# Patient Record
Sex: FEMALE | Race: WHITE | NOT HISPANIC OR LATINO | URBAN - METROPOLITAN AREA
[De-identification: names, ages, dates, MRNs, and addresses within clinical notes are randomized per-mention and may not be internally consistent; named-entity substitution may affect disease eponyms.]

---

## 2017-06-12 ENCOUNTER — OUTPATIENT (OUTPATIENT)
Dept: OUTPATIENT SERVICES | Facility: HOSPITAL | Age: 57
LOS: 1 days | Discharge: HOME | End: 2017-06-12

## 2017-06-12 DIAGNOSIS — F11.10 OPIOID ABUSE, UNCOMPLICATED: ICD-10-CM

## 2017-06-12 DIAGNOSIS — J44.1 CHRONIC OBSTRUCTIVE PULMONARY DISEASE WITH (ACUTE) EXACERBATION: ICD-10-CM

## 2017-06-12 DIAGNOSIS — R07.9 CHEST PAIN, UNSPECIFIED: ICD-10-CM

## 2017-06-12 DIAGNOSIS — M47.812 SPONDYLOSIS WITHOUT MYELOPATHY OR RADICULOPATHY, CERVICAL REGION: ICD-10-CM

## 2017-06-12 DIAGNOSIS — R09.02 HYPOXEMIA: ICD-10-CM

## 2017-06-12 DIAGNOSIS — F10.10 ALCOHOL ABUSE, UNCOMPLICATED: ICD-10-CM

## 2017-06-12 DIAGNOSIS — F33.2 MAJOR DEPRESSIVE DISORDER, RECURRENT SEVERE WITHOUT PSYCHOTIC FEATURES: ICD-10-CM

## 2017-06-28 DIAGNOSIS — F33.1 MAJOR DEPRESSIVE DISORDER, RECURRENT, MODERATE: ICD-10-CM

## 2017-07-20 ENCOUNTER — OUTPATIENT (OUTPATIENT)
Dept: OUTPATIENT SERVICES | Facility: HOSPITAL | Age: 57
LOS: 1 days | Discharge: HOME | End: 2017-07-20

## 2017-07-20 DIAGNOSIS — R09.02 HYPOXEMIA: ICD-10-CM

## 2017-07-20 DIAGNOSIS — J44.1 CHRONIC OBSTRUCTIVE PULMONARY DISEASE WITH (ACUTE) EXACERBATION: ICD-10-CM

## 2017-07-20 DIAGNOSIS — F33.2 MAJOR DEPRESSIVE DISORDER, RECURRENT SEVERE WITHOUT PSYCHOTIC FEATURES: ICD-10-CM

## 2017-07-20 DIAGNOSIS — F11.10 OPIOID ABUSE, UNCOMPLICATED: ICD-10-CM

## 2017-07-20 DIAGNOSIS — F33.1 MAJOR DEPRESSIVE DISORDER, RECURRENT, MODERATE: ICD-10-CM

## 2017-07-20 DIAGNOSIS — R07.9 CHEST PAIN, UNSPECIFIED: ICD-10-CM

## 2017-07-20 DIAGNOSIS — M47.812 SPONDYLOSIS WITHOUT MYELOPATHY OR RADICULOPATHY, CERVICAL REGION: ICD-10-CM

## 2017-07-20 DIAGNOSIS — F10.10 ALCOHOL ABUSE, UNCOMPLICATED: ICD-10-CM

## 2017-08-28 ENCOUNTER — OUTPATIENT (OUTPATIENT)
Dept: OUTPATIENT SERVICES | Facility: HOSPITAL | Age: 57
LOS: 1 days | Discharge: HOME | End: 2017-08-28

## 2017-08-28 DIAGNOSIS — F33.1 MAJOR DEPRESSIVE DISORDER, RECURRENT, MODERATE: ICD-10-CM

## 2017-08-28 DIAGNOSIS — F11.10 OPIOID ABUSE, UNCOMPLICATED: ICD-10-CM

## 2017-08-28 DIAGNOSIS — F33.2 MAJOR DEPRESSIVE DISORDER, RECURRENT SEVERE WITHOUT PSYCHOTIC FEATURES: ICD-10-CM

## 2017-08-28 DIAGNOSIS — R07.9 CHEST PAIN, UNSPECIFIED: ICD-10-CM

## 2017-08-28 DIAGNOSIS — F10.10 ALCOHOL ABUSE, UNCOMPLICATED: ICD-10-CM

## 2017-08-28 DIAGNOSIS — M47.812 SPONDYLOSIS WITHOUT MYELOPATHY OR RADICULOPATHY, CERVICAL REGION: ICD-10-CM

## 2017-08-28 DIAGNOSIS — R09.02 HYPOXEMIA: ICD-10-CM

## 2017-08-28 DIAGNOSIS — J44.1 CHRONIC OBSTRUCTIVE PULMONARY DISEASE WITH (ACUTE) EXACERBATION: ICD-10-CM

## 2017-09-28 ENCOUNTER — OUTPATIENT (OUTPATIENT)
Dept: OUTPATIENT SERVICES | Facility: HOSPITAL | Age: 57
LOS: 1 days | Discharge: HOME | End: 2017-09-28

## 2017-09-28 DIAGNOSIS — M47.812 SPONDYLOSIS WITHOUT MYELOPATHY OR RADICULOPATHY, CERVICAL REGION: ICD-10-CM

## 2017-09-28 DIAGNOSIS — R09.02 HYPOXEMIA: ICD-10-CM

## 2017-09-28 DIAGNOSIS — F33.2 MAJOR DEPRESSIVE DISORDER, RECURRENT SEVERE WITHOUT PSYCHOTIC FEATURES: ICD-10-CM

## 2017-09-28 DIAGNOSIS — F10.10 ALCOHOL ABUSE, UNCOMPLICATED: ICD-10-CM

## 2017-09-28 DIAGNOSIS — J44.1 CHRONIC OBSTRUCTIVE PULMONARY DISEASE WITH (ACUTE) EXACERBATION: ICD-10-CM

## 2017-09-28 DIAGNOSIS — F33.1 MAJOR DEPRESSIVE DISORDER, RECURRENT, MODERATE: ICD-10-CM

## 2017-09-28 DIAGNOSIS — F11.10 OPIOID ABUSE, UNCOMPLICATED: ICD-10-CM

## 2017-09-28 DIAGNOSIS — R07.9 CHEST PAIN, UNSPECIFIED: ICD-10-CM

## 2017-10-26 ENCOUNTER — OUTPATIENT (OUTPATIENT)
Dept: OUTPATIENT SERVICES | Facility: HOSPITAL | Age: 57
LOS: 1 days | Discharge: HOME | End: 2017-10-26

## 2017-10-26 DIAGNOSIS — F33.1 MAJOR DEPRESSIVE DISORDER, RECURRENT, MODERATE: ICD-10-CM

## 2017-11-30 ENCOUNTER — OUTPATIENT (OUTPATIENT)
Dept: OUTPATIENT SERVICES | Facility: HOSPITAL | Age: 57
LOS: 1 days | Discharge: HOME | End: 2017-11-30

## 2017-11-30 DIAGNOSIS — F33.1 MAJOR DEPRESSIVE DISORDER, RECURRENT, MODERATE: ICD-10-CM

## 2017-12-19 ENCOUNTER — OUTPATIENT (OUTPATIENT)
Dept: OUTPATIENT SERVICES | Facility: HOSPITAL | Age: 57
LOS: 1 days | Discharge: HOME | End: 2017-12-19

## 2017-12-19 DIAGNOSIS — F33.1 MAJOR DEPRESSIVE DISORDER, RECURRENT, MODERATE: ICD-10-CM

## 2018-01-18 ENCOUNTER — EMERGENCY (EMERGENCY)
Facility: HOSPITAL | Age: 58
LOS: 0 days | Discharge: HOME | End: 2018-01-18

## 2018-01-18 DIAGNOSIS — J44.1 CHRONIC OBSTRUCTIVE PULMONARY DISEASE WITH (ACUTE) EXACERBATION: ICD-10-CM

## 2018-01-18 DIAGNOSIS — R10.32 LEFT LOWER QUADRANT PAIN: ICD-10-CM

## 2018-01-18 DIAGNOSIS — R07.9 CHEST PAIN, UNSPECIFIED: ICD-10-CM

## 2018-01-18 DIAGNOSIS — F11.10 OPIOID ABUSE, UNCOMPLICATED: ICD-10-CM

## 2018-01-18 DIAGNOSIS — F33.2 MAJOR DEPRESSIVE DISORDER, RECURRENT SEVERE WITHOUT PSYCHOTIC FEATURES: ICD-10-CM

## 2018-01-18 DIAGNOSIS — M47.812 SPONDYLOSIS WITHOUT MYELOPATHY OR RADICULOPATHY, CERVICAL REGION: ICD-10-CM

## 2018-01-18 DIAGNOSIS — F10.10 ALCOHOL ABUSE, UNCOMPLICATED: ICD-10-CM

## 2018-01-18 DIAGNOSIS — R19.7 DIARRHEA, UNSPECIFIED: ICD-10-CM

## 2018-01-18 DIAGNOSIS — R35.0 FREQUENCY OF MICTURITION: ICD-10-CM

## 2018-01-18 DIAGNOSIS — R11.0 NAUSEA: ICD-10-CM

## 2018-01-18 DIAGNOSIS — R09.02 HYPOXEMIA: ICD-10-CM

## 2018-01-18 DIAGNOSIS — Z79.899 OTHER LONG TERM (CURRENT) DRUG THERAPY: ICD-10-CM

## 2018-01-18 DIAGNOSIS — R30.0 DYSURIA: ICD-10-CM

## 2018-01-30 DIAGNOSIS — M47.812 SPONDYLOSIS WITHOUT MYELOPATHY OR RADICULOPATHY, CERVICAL REGION: ICD-10-CM

## 2018-01-30 DIAGNOSIS — J44.1 CHRONIC OBSTRUCTIVE PULMONARY DISEASE WITH (ACUTE) EXACERBATION: ICD-10-CM

## 2018-01-30 DIAGNOSIS — F11.10 OPIOID ABUSE, UNCOMPLICATED: ICD-10-CM

## 2018-01-30 DIAGNOSIS — F10.10 ALCOHOL ABUSE, UNCOMPLICATED: ICD-10-CM

## 2018-01-30 DIAGNOSIS — R09.02 HYPOXEMIA: ICD-10-CM

## 2018-01-30 DIAGNOSIS — F33.2 MAJOR DEPRESSIVE DISORDER, RECURRENT SEVERE WITHOUT PSYCHOTIC FEATURES: ICD-10-CM

## 2018-01-30 DIAGNOSIS — R07.9 CHEST PAIN, UNSPECIFIED: ICD-10-CM

## 2018-01-31 ENCOUNTER — OUTPATIENT (OUTPATIENT)
Dept: OUTPATIENT SERVICES | Facility: HOSPITAL | Age: 58
LOS: 1 days | Discharge: HOME | End: 2018-01-31

## 2018-01-31 DIAGNOSIS — F33.1 MAJOR DEPRESSIVE DISORDER, RECURRENT, MODERATE: ICD-10-CM

## 2018-02-12 ENCOUNTER — OUTPATIENT (OUTPATIENT)
Dept: OUTPATIENT SERVICES | Facility: HOSPITAL | Age: 58
LOS: 1 days | Discharge: HOME | End: 2018-02-12

## 2018-02-12 DIAGNOSIS — F33.1 MAJOR DEPRESSIVE DISORDER, RECURRENT, MODERATE: ICD-10-CM

## 2018-02-28 ENCOUNTER — OUTPATIENT (OUTPATIENT)
Dept: OUTPATIENT SERVICES | Facility: HOSPITAL | Age: 58
LOS: 1 days | Discharge: HOME | End: 2018-02-28

## 2018-02-28 DIAGNOSIS — F33.1 MAJOR DEPRESSIVE DISORDER, RECURRENT, MODERATE: ICD-10-CM

## 2018-03-09 ENCOUNTER — OUTPATIENT (OUTPATIENT)
Dept: OUTPATIENT SERVICES | Facility: HOSPITAL | Age: 58
LOS: 1 days | Discharge: HOME | End: 2018-03-09

## 2018-03-09 DIAGNOSIS — F33.1 MAJOR DEPRESSIVE DISORDER, RECURRENT, MODERATE: ICD-10-CM

## 2018-04-18 ENCOUNTER — EMERGENCY (EMERGENCY)
Facility: HOSPITAL | Age: 58
LOS: 0 days | Discharge: HOME | End: 2018-04-18
Admitting: PHYSICIAN ASSISTANT

## 2018-04-18 VITALS
DIASTOLIC BLOOD PRESSURE: 53 MMHG | OXYGEN SATURATION: 98 % | RESPIRATION RATE: 18 BRPM | TEMPERATURE: 97 F | SYSTOLIC BLOOD PRESSURE: 104 MMHG | HEART RATE: 70 BPM

## 2018-04-18 DIAGNOSIS — F17.200 NICOTINE DEPENDENCE, UNSPECIFIED, UNCOMPLICATED: ICD-10-CM

## 2018-04-18 DIAGNOSIS — M54.5 LOW BACK PAIN: ICD-10-CM

## 2018-04-18 DIAGNOSIS — Z79.899 OTHER LONG TERM (CURRENT) DRUG THERAPY: ICD-10-CM

## 2018-04-18 DIAGNOSIS — G89.29 OTHER CHRONIC PAIN: ICD-10-CM

## 2018-04-18 RX ORDER — KETOROLAC TROMETHAMINE 30 MG/ML
30 SYRINGE (ML) INJECTION ONCE
Qty: 0 | Refills: 0 | Status: DISCONTINUED | OUTPATIENT
Start: 2018-04-18 | End: 2018-04-18

## 2018-04-18 RX ORDER — METHOCARBAMOL 500 MG/1
1 TABLET, FILM COATED ORAL
Qty: 21 | Refills: 0
Start: 2018-04-18 | End: 2018-04-24

## 2018-04-18 RX ORDER — IBUPROFEN 200 MG
1 TABLET ORAL
Qty: 42 | Refills: 0
Start: 2018-04-18 | End: 2018-05-01

## 2018-04-18 RX ADMIN — Medication 30 MILLIGRAM(S): at 11:40

## 2018-04-18 NOTE — ED PROVIDER NOTE - MUSCULOSKELETAL, MLM
Spine appears normal, range of motion is not limited, (+) Left lower paraspinal Lumbar tenderness with spasm, no midline tenderness

## 2018-04-18 NOTE — ED PROVIDER NOTE - OBJECTIVE STATEMENT
58 y.o. female with a PMHx of degenerative disc disease presented to the ER c/o Left sided low back pain for the past month, worse over the past several days.  Pt denies fever, chills, hematuria, abdominal pain, saddle numbness, bladder/bowel incontinence, lower extremity weakness/paresthesias.  Pain worse with standing, movement, and prolonged sitting.

## 2018-04-18 NOTE — ED PROVIDER NOTE - MEDICAL DECISION MAKING DETAILS
NSAIDS; muscle relaxers; Rehab referral; any new or worsening symptoms, will return to ER NSAIDS; muscle relaxers; Rehab referral; any new or worsening symptoms, will return to ER  Note complete

## 2018-04-18 NOTE — ED PROVIDER NOTE - PMH
Bile duct stone with obstruct    Hiatal hernia Bile duct stone with obstruct    Degenerative disc disease, lumbar    Hiatal hernia

## 2018-04-24 ENCOUNTER — OUTPATIENT (OUTPATIENT)
Dept: OUTPATIENT SERVICES | Facility: HOSPITAL | Age: 58
LOS: 1 days | Discharge: HOME | End: 2018-04-24

## 2018-04-24 DIAGNOSIS — F33.1 MAJOR DEPRESSIVE DISORDER, RECURRENT, MODERATE: ICD-10-CM

## 2018-05-31 ENCOUNTER — OUTPATIENT (OUTPATIENT)
Dept: OUTPATIENT SERVICES | Facility: HOSPITAL | Age: 58
LOS: 1 days | Discharge: HOME | End: 2018-05-31

## 2018-05-31 DIAGNOSIS — F33.1 MAJOR DEPRESSIVE DISORDER, RECURRENT, MODERATE: ICD-10-CM

## 2018-06-29 ENCOUNTER — OUTPATIENT (OUTPATIENT)
Dept: OUTPATIENT SERVICES | Facility: HOSPITAL | Age: 58
LOS: 1 days | Discharge: HOME | End: 2018-06-29

## 2018-06-29 DIAGNOSIS — F33.1 MAJOR DEPRESSIVE DISORDER, RECURRENT, MODERATE: ICD-10-CM

## 2018-07-30 ENCOUNTER — OUTPATIENT (OUTPATIENT)
Dept: OUTPATIENT SERVICES | Facility: HOSPITAL | Age: 58
LOS: 1 days | Discharge: HOME | End: 2018-07-30

## 2018-07-30 DIAGNOSIS — F33.1 MAJOR DEPRESSIVE DISORDER, RECURRENT, MODERATE: ICD-10-CM

## 2018-08-06 PROBLEM — K44.9 DIAPHRAGMATIC HERNIA WITHOUT OBSTRUCTION OR GANGRENE: Chronic | Status: ACTIVE | Noted: 2018-04-18

## 2018-08-06 PROBLEM — M51.36 OTHER INTERVERTEBRAL DISC DEGENERATION, LUMBAR REGION: Chronic | Status: ACTIVE | Noted: 2018-04-18

## 2018-08-06 PROBLEM — K80.51 CALCULUS OF BILE DUCT WITHOUT CHOLANGITIS OR CHOLECYSTITIS WITH OBSTRUCTION: Chronic | Status: ACTIVE | Noted: 2018-04-18

## 2018-08-31 ENCOUNTER — OUTPATIENT (OUTPATIENT)
Dept: OUTPATIENT SERVICES | Facility: HOSPITAL | Age: 58
LOS: 1 days | Discharge: HOME | End: 2018-08-31

## 2018-08-31 DIAGNOSIS — F33.1 MAJOR DEPRESSIVE DISORDER, RECURRENT, MODERATE: ICD-10-CM

## 2018-09-28 ENCOUNTER — OUTPATIENT (OUTPATIENT)
Dept: OUTPATIENT SERVICES | Facility: HOSPITAL | Age: 58
LOS: 1 days | Discharge: HOME | End: 2018-09-28

## 2018-09-28 DIAGNOSIS — F33.1 MAJOR DEPRESSIVE DISORDER, RECURRENT, MODERATE: ICD-10-CM

## 2018-10-19 ENCOUNTER — OUTPATIENT (OUTPATIENT)
Dept: OUTPATIENT SERVICES | Facility: HOSPITAL | Age: 58
LOS: 1 days | Discharge: HOME | End: 2018-10-19

## 2018-10-19 DIAGNOSIS — F33.1 MAJOR DEPRESSIVE DISORDER, RECURRENT, MODERATE: ICD-10-CM

## 2018-11-19 ENCOUNTER — OUTPATIENT (OUTPATIENT)
Dept: OUTPATIENT SERVICES | Facility: HOSPITAL | Age: 58
LOS: 1 days | Discharge: HOME | End: 2018-11-19

## 2018-11-19 DIAGNOSIS — F33.1 MAJOR DEPRESSIVE DISORDER, RECURRENT, MODERATE: ICD-10-CM

## 2018-12-18 ENCOUNTER — OUTPATIENT (OUTPATIENT)
Dept: OUTPATIENT SERVICES | Facility: HOSPITAL | Age: 58
LOS: 1 days | Discharge: HOME | End: 2018-12-18

## 2018-12-18 DIAGNOSIS — F33.1 MAJOR DEPRESSIVE DISORDER, RECURRENT, MODERATE: ICD-10-CM

## 2019-01-03 ENCOUNTER — OUTPATIENT (OUTPATIENT)
Dept: OUTPATIENT SERVICES | Facility: HOSPITAL | Age: 59
LOS: 1 days | Discharge: HOME | End: 2019-01-03

## 2019-01-03 DIAGNOSIS — F33.1 MAJOR DEPRESSIVE DISORDER, RECURRENT, MODERATE: ICD-10-CM

## 2019-02-05 ENCOUNTER — OUTPATIENT (OUTPATIENT)
Dept: OUTPATIENT SERVICES | Facility: HOSPITAL | Age: 59
LOS: 1 days | Discharge: HOME | End: 2019-02-05

## 2019-02-05 DIAGNOSIS — F33.1 MAJOR DEPRESSIVE DISORDER, RECURRENT, MODERATE: ICD-10-CM

## 2019-02-15 ENCOUNTER — OUTPATIENT (OUTPATIENT)
Dept: OUTPATIENT SERVICES | Facility: HOSPITAL | Age: 59
LOS: 1 days | Discharge: HOME | End: 2019-02-15

## 2019-02-15 DIAGNOSIS — F33.1 MAJOR DEPRESSIVE DISORDER, RECURRENT, MODERATE: ICD-10-CM

## 2019-02-19 ENCOUNTER — OUTPATIENT (OUTPATIENT)
Dept: OUTPATIENT SERVICES | Facility: HOSPITAL | Age: 59
LOS: 1 days | Discharge: HOME | End: 2019-02-19

## 2019-02-19 DIAGNOSIS — F33.1 MAJOR DEPRESSIVE DISORDER, RECURRENT, MODERATE: ICD-10-CM

## 2019-03-08 ENCOUNTER — OUTPATIENT (OUTPATIENT)
Dept: OUTPATIENT SERVICES | Facility: HOSPITAL | Age: 59
LOS: 1 days | Discharge: HOME | End: 2019-03-08

## 2019-03-08 DIAGNOSIS — F33.1 MAJOR DEPRESSIVE DISORDER, RECURRENT, MODERATE: ICD-10-CM

## 2019-03-15 ENCOUNTER — OUTPATIENT (OUTPATIENT)
Dept: OUTPATIENT SERVICES | Facility: HOSPITAL | Age: 59
LOS: 1 days | Discharge: HOME | End: 2019-03-15

## 2019-03-15 DIAGNOSIS — F33.1 MAJOR DEPRESSIVE DISORDER, RECURRENT, MODERATE: ICD-10-CM

## 2019-03-22 ENCOUNTER — OUTPATIENT (OUTPATIENT)
Dept: OUTPATIENT SERVICES | Facility: HOSPITAL | Age: 59
LOS: 1 days | Discharge: HOME | End: 2019-03-22

## 2019-03-22 DIAGNOSIS — F33.1 MAJOR DEPRESSIVE DISORDER, RECURRENT, MODERATE: ICD-10-CM

## 2019-03-29 ENCOUNTER — OUTPATIENT (OUTPATIENT)
Dept: OUTPATIENT SERVICES | Facility: HOSPITAL | Age: 59
LOS: 1 days | Discharge: HOME | End: 2019-03-29

## 2019-03-29 DIAGNOSIS — F33.1 MAJOR DEPRESSIVE DISORDER, RECURRENT, MODERATE: ICD-10-CM

## 2019-04-10 ENCOUNTER — OUTPATIENT (OUTPATIENT)
Dept: OUTPATIENT SERVICES | Facility: HOSPITAL | Age: 59
LOS: 1 days | Discharge: HOME | End: 2019-04-10

## 2019-04-10 DIAGNOSIS — F33.1 MAJOR DEPRESSIVE DISORDER, RECURRENT, MODERATE: ICD-10-CM

## 2019-04-11 ENCOUNTER — OUTPATIENT (OUTPATIENT)
Dept: OUTPATIENT SERVICES | Facility: HOSPITAL | Age: 59
LOS: 1 days | Discharge: HOME | End: 2019-04-11

## 2019-04-11 DIAGNOSIS — F33.1 MAJOR DEPRESSIVE DISORDER, RECURRENT, MODERATE: ICD-10-CM

## 2019-04-26 ENCOUNTER — OUTPATIENT (OUTPATIENT)
Dept: OUTPATIENT SERVICES | Facility: HOSPITAL | Age: 59
LOS: 1 days | Discharge: HOME | End: 2019-04-26

## 2019-04-26 DIAGNOSIS — F33.1 MAJOR DEPRESSIVE DISORDER, RECURRENT, MODERATE: ICD-10-CM

## 2019-04-29 ENCOUNTER — OUTPATIENT (OUTPATIENT)
Dept: OUTPATIENT SERVICES | Facility: HOSPITAL | Age: 59
LOS: 1 days | Discharge: HOME | End: 2019-04-29

## 2019-04-29 DIAGNOSIS — F33.1 MAJOR DEPRESSIVE DISORDER, RECURRENT, MODERATE: ICD-10-CM

## 2019-05-09 ENCOUNTER — OUTPATIENT (OUTPATIENT)
Dept: OUTPATIENT SERVICES | Facility: HOSPITAL | Age: 59
LOS: 1 days | Discharge: HOME | End: 2019-05-09

## 2019-05-10 ENCOUNTER — OUTPATIENT (OUTPATIENT)
Dept: OUTPATIENT SERVICES | Facility: HOSPITAL | Age: 59
LOS: 1 days | Discharge: HOME | End: 2019-05-10
Payer: MEDICAID

## 2019-05-10 DIAGNOSIS — F33.1 MAJOR DEPRESSIVE DISORDER, RECURRENT, MODERATE: ICD-10-CM

## 2019-05-10 PROCEDURE — 99214 OFFICE O/P EST MOD 30 MIN: CPT

## 2019-05-30 ENCOUNTER — OUTPATIENT (OUTPATIENT)
Dept: OUTPATIENT SERVICES | Facility: HOSPITAL | Age: 59
LOS: 1 days | Discharge: HOME | End: 2019-05-30

## 2019-05-30 DIAGNOSIS — F33.1 MAJOR DEPRESSIVE DISORDER, RECURRENT, MODERATE: ICD-10-CM

## 2019-06-06 ENCOUNTER — OUTPATIENT (OUTPATIENT)
Dept: OUTPATIENT SERVICES | Facility: HOSPITAL | Age: 59
LOS: 1 days | Discharge: HOME | End: 2019-06-06

## 2019-06-06 DIAGNOSIS — F33.1 MAJOR DEPRESSIVE DISORDER, RECURRENT, MODERATE: ICD-10-CM

## 2019-06-17 ENCOUNTER — OUTPATIENT (OUTPATIENT)
Dept: OUTPATIENT SERVICES | Facility: HOSPITAL | Age: 59
LOS: 1 days | Discharge: HOME | End: 2019-06-17

## 2019-06-17 DIAGNOSIS — F33.1 MAJOR DEPRESSIVE DISORDER, RECURRENT, MODERATE: ICD-10-CM

## 2019-06-27 ENCOUNTER — OUTPATIENT (OUTPATIENT)
Dept: OUTPATIENT SERVICES | Facility: HOSPITAL | Age: 59
LOS: 1 days | Discharge: HOME | End: 2019-06-27
Payer: MEDICAID

## 2019-06-27 DIAGNOSIS — F33.1 MAJOR DEPRESSIVE DISORDER, RECURRENT, MODERATE: ICD-10-CM

## 2019-06-27 PROCEDURE — 99214 OFFICE O/P EST MOD 30 MIN: CPT

## 2019-07-02 ENCOUNTER — OUTPATIENT (OUTPATIENT)
Dept: OUTPATIENT SERVICES | Facility: HOSPITAL | Age: 59
LOS: 1 days | Discharge: HOME | End: 2019-07-02

## 2019-07-02 DIAGNOSIS — F33.1 MAJOR DEPRESSIVE DISORDER, RECURRENT, MODERATE: ICD-10-CM

## 2019-07-16 ENCOUNTER — OUTPATIENT (OUTPATIENT)
Dept: OUTPATIENT SERVICES | Facility: HOSPITAL | Age: 59
LOS: 1 days | Discharge: HOME | End: 2019-07-16

## 2019-07-16 DIAGNOSIS — F33.1 MAJOR DEPRESSIVE DISORDER, RECURRENT, MODERATE: ICD-10-CM

## 2019-07-23 ENCOUNTER — OUTPATIENT (OUTPATIENT)
Dept: OUTPATIENT SERVICES | Facility: HOSPITAL | Age: 59
LOS: 1 days | Discharge: HOME | End: 2019-07-23

## 2019-07-23 DIAGNOSIS — F33.1 MAJOR DEPRESSIVE DISORDER, RECURRENT, MODERATE: ICD-10-CM

## 2019-07-26 ENCOUNTER — OUTPATIENT (OUTPATIENT)
Dept: OUTPATIENT SERVICES | Facility: HOSPITAL | Age: 59
LOS: 1 days | Discharge: HOME | End: 2019-07-26

## 2019-07-26 DIAGNOSIS — F33.1 MAJOR DEPRESSIVE DISORDER, RECURRENT, MODERATE: ICD-10-CM

## 2019-07-30 ENCOUNTER — OUTPATIENT (OUTPATIENT)
Dept: OUTPATIENT SERVICES | Facility: HOSPITAL | Age: 59
LOS: 1 days | Discharge: HOME | End: 2019-07-30

## 2019-07-30 DIAGNOSIS — F33.1 MAJOR DEPRESSIVE DISORDER, RECURRENT, MODERATE: ICD-10-CM

## 2019-08-06 ENCOUNTER — OUTPATIENT (OUTPATIENT)
Dept: OUTPATIENT SERVICES | Facility: HOSPITAL | Age: 59
LOS: 1 days | Discharge: HOME | End: 2019-08-06

## 2019-08-06 DIAGNOSIS — F33.1 MAJOR DEPRESSIVE DISORDER, RECURRENT, MODERATE: ICD-10-CM

## 2019-08-13 ENCOUNTER — OUTPATIENT (OUTPATIENT)
Dept: OUTPATIENT SERVICES | Facility: HOSPITAL | Age: 59
LOS: 1 days | Discharge: HOME | End: 2019-08-13

## 2019-08-13 DIAGNOSIS — F33.1 MAJOR DEPRESSIVE DISORDER, RECURRENT, MODERATE: ICD-10-CM

## 2019-08-20 ENCOUNTER — OUTPATIENT (OUTPATIENT)
Dept: OUTPATIENT SERVICES | Facility: HOSPITAL | Age: 59
LOS: 1 days | Discharge: HOME | End: 2019-08-20

## 2019-08-20 DIAGNOSIS — F33.1 MAJOR DEPRESSIVE DISORDER, RECURRENT, MODERATE: ICD-10-CM

## 2019-08-23 ENCOUNTER — OUTPATIENT (OUTPATIENT)
Dept: OUTPATIENT SERVICES | Facility: HOSPITAL | Age: 59
LOS: 1 days | Discharge: HOME | End: 2019-08-23
Payer: MEDICAID

## 2019-08-23 DIAGNOSIS — F33.1 MAJOR DEPRESSIVE DISORDER, RECURRENT, MODERATE: ICD-10-CM

## 2019-08-23 PROCEDURE — 99214 OFFICE O/P EST MOD 30 MIN: CPT

## 2019-08-27 ENCOUNTER — OUTPATIENT (OUTPATIENT)
Dept: OUTPATIENT SERVICES | Facility: HOSPITAL | Age: 59
LOS: 1 days | Discharge: HOME | End: 2019-08-27

## 2019-08-27 DIAGNOSIS — F33.1 MAJOR DEPRESSIVE DISORDER, RECURRENT, MODERATE: ICD-10-CM

## 2019-09-05 ENCOUNTER — OUTPATIENT (OUTPATIENT)
Dept: OUTPATIENT SERVICES | Facility: HOSPITAL | Age: 59
LOS: 1 days | Discharge: HOME | End: 2019-09-05

## 2019-09-05 DIAGNOSIS — F33.1 MAJOR DEPRESSIVE DISORDER, RECURRENT, MODERATE: ICD-10-CM

## 2019-09-12 ENCOUNTER — OUTPATIENT (OUTPATIENT)
Dept: OUTPATIENT SERVICES | Facility: HOSPITAL | Age: 59
LOS: 1 days | Discharge: HOME | End: 2019-09-12

## 2019-09-12 DIAGNOSIS — F33.1 MAJOR DEPRESSIVE DISORDER, RECURRENT, MODERATE: ICD-10-CM

## 2019-09-19 ENCOUNTER — OUTPATIENT (OUTPATIENT)
Dept: OUTPATIENT SERVICES | Facility: HOSPITAL | Age: 59
LOS: 1 days | Discharge: HOME | End: 2019-09-19

## 2019-09-19 DIAGNOSIS — F33.1 MAJOR DEPRESSIVE DISORDER, RECURRENT, MODERATE: ICD-10-CM

## 2019-09-24 ENCOUNTER — OUTPATIENT (OUTPATIENT)
Dept: OUTPATIENT SERVICES | Facility: HOSPITAL | Age: 59
LOS: 1 days | Discharge: HOME | End: 2019-09-24
Payer: MEDICAID

## 2019-09-24 DIAGNOSIS — F33.1 MAJOR DEPRESSIVE DISORDER, RECURRENT, MODERATE: ICD-10-CM

## 2019-09-24 PROCEDURE — 99213 OFFICE O/P EST LOW 20 MIN: CPT

## 2019-09-27 ENCOUNTER — OUTPATIENT (OUTPATIENT)
Dept: OUTPATIENT SERVICES | Facility: HOSPITAL | Age: 59
LOS: 1 days | Discharge: HOME | End: 2019-09-27

## 2019-09-27 DIAGNOSIS — F33.1 MAJOR DEPRESSIVE DISORDER, RECURRENT, MODERATE: ICD-10-CM

## 2019-10-04 ENCOUNTER — OUTPATIENT (OUTPATIENT)
Dept: OUTPATIENT SERVICES | Facility: HOSPITAL | Age: 59
LOS: 1 days | Discharge: HOME | End: 2019-10-04

## 2019-10-04 DIAGNOSIS — F33.1 MAJOR DEPRESSIVE DISORDER, RECURRENT, MODERATE: ICD-10-CM

## 2019-10-18 ENCOUNTER — OUTPATIENT (OUTPATIENT)
Dept: OUTPATIENT SERVICES | Facility: HOSPITAL | Age: 59
LOS: 1 days | Discharge: HOME | End: 2019-10-18

## 2019-10-18 DIAGNOSIS — F33.1 MAJOR DEPRESSIVE DISORDER, RECURRENT, MODERATE: ICD-10-CM

## 2019-10-22 ENCOUNTER — OUTPATIENT (OUTPATIENT)
Dept: OUTPATIENT SERVICES | Facility: HOSPITAL | Age: 59
LOS: 1 days | Discharge: HOME | End: 2019-10-22
Payer: MEDICAID

## 2019-10-22 DIAGNOSIS — F33.1 MAJOR DEPRESSIVE DISORDER, RECURRENT, MODERATE: ICD-10-CM

## 2019-10-22 PROCEDURE — 99214 OFFICE O/P EST MOD 30 MIN: CPT

## 2019-10-31 ENCOUNTER — OUTPATIENT (OUTPATIENT)
Dept: OUTPATIENT SERVICES | Facility: HOSPITAL | Age: 59
LOS: 1 days | Discharge: HOME | End: 2019-10-31

## 2019-10-31 DIAGNOSIS — F33.1 MAJOR DEPRESSIVE DISORDER, RECURRENT, MODERATE: ICD-10-CM

## 2019-11-07 ENCOUNTER — OUTPATIENT (OUTPATIENT)
Dept: OUTPATIENT SERVICES | Facility: HOSPITAL | Age: 59
LOS: 1 days | Discharge: HOME | End: 2019-11-07

## 2019-11-07 DIAGNOSIS — F33.1 MAJOR DEPRESSIVE DISORDER, RECURRENT, MODERATE: ICD-10-CM

## 2019-12-06 ENCOUNTER — OUTPATIENT (OUTPATIENT)
Dept: OUTPATIENT SERVICES | Facility: HOSPITAL | Age: 59
LOS: 1 days | Discharge: HOME | End: 2019-12-06
Payer: MEDICAID

## 2019-12-06 DIAGNOSIS — F33.1 MAJOR DEPRESSIVE DISORDER, RECURRENT, MODERATE: ICD-10-CM

## 2019-12-06 PROCEDURE — 99214 OFFICE O/P EST MOD 30 MIN: CPT

## 2019-12-11 ENCOUNTER — OUTPATIENT (OUTPATIENT)
Dept: OUTPATIENT SERVICES | Facility: HOSPITAL | Age: 59
LOS: 1 days | Discharge: HOME | End: 2019-12-11

## 2019-12-11 DIAGNOSIS — F33.1 MAJOR DEPRESSIVE DISORDER, RECURRENT, MODERATE: ICD-10-CM

## 2019-12-30 ENCOUNTER — EMERGENCY (EMERGENCY)
Facility: HOSPITAL | Age: 59
LOS: 0 days | Discharge: HOME | End: 2019-12-30
Attending: EMERGENCY MEDICINE | Admitting: EMERGENCY MEDICINE
Payer: MEDICAID

## 2019-12-30 VITALS
OXYGEN SATURATION: 98 % | WEIGHT: 128.09 LBS | DIASTOLIC BLOOD PRESSURE: 83 MMHG | SYSTOLIC BLOOD PRESSURE: 128 MMHG | HEIGHT: 66 IN | TEMPERATURE: 97 F | HEART RATE: 64 BPM | RESPIRATION RATE: 18 BRPM

## 2019-12-30 DIAGNOSIS — R11.2 NAUSEA WITH VOMITING, UNSPECIFIED: ICD-10-CM

## 2019-12-30 DIAGNOSIS — R06.02 SHORTNESS OF BREATH: ICD-10-CM

## 2019-12-30 DIAGNOSIS — R10.13 EPIGASTRIC PAIN: ICD-10-CM

## 2019-12-30 DIAGNOSIS — R42 DIZZINESS AND GIDDINESS: ICD-10-CM

## 2019-12-30 DIAGNOSIS — K80.70 CALCULUS OF GALLBLADDER AND BILE DUCT WITHOUT CHOLECYSTITIS WITHOUT OBSTRUCTION: Chronic | ICD-10-CM

## 2019-12-30 LAB
ALBUMIN SERPL ELPH-MCNC: 5 G/DL — SIGNIFICANT CHANGE UP (ref 3.5–5.2)
ALP SERPL-CCNC: 44 U/L — SIGNIFICANT CHANGE UP (ref 30–115)
ALT FLD-CCNC: 19 U/L — SIGNIFICANT CHANGE UP (ref 0–41)
ANION GAP SERPL CALC-SCNC: 15 MMOL/L — HIGH (ref 7–14)
AST SERPL-CCNC: 58 U/L — HIGH (ref 0–41)
BASOPHILS # BLD AUTO: 0.06 K/UL — SIGNIFICANT CHANGE UP (ref 0–0.2)
BASOPHILS NFR BLD AUTO: 0.8 % — SIGNIFICANT CHANGE UP (ref 0–1)
BILIRUB DIRECT SERPL-MCNC: <0.2 MG/DL — SIGNIFICANT CHANGE UP (ref 0–0.2)
BILIRUB INDIRECT FLD-MCNC: >0.2 MG/DL — SIGNIFICANT CHANGE UP (ref 0.2–1.2)
BILIRUB SERPL-MCNC: 0.4 MG/DL — SIGNIFICANT CHANGE UP (ref 0.2–1.2)
BUN SERPL-MCNC: 11 MG/DL — SIGNIFICANT CHANGE UP (ref 10–20)
CALCIUM SERPL-MCNC: 10.1 MG/DL — SIGNIFICANT CHANGE UP (ref 8.5–10.1)
CHLORIDE SERPL-SCNC: 93 MMOL/L — LOW (ref 98–110)
CO2 SERPL-SCNC: 27 MMOL/L — SIGNIFICANT CHANGE UP (ref 17–32)
CREAT SERPL-MCNC: 0.8 MG/DL — SIGNIFICANT CHANGE UP (ref 0.7–1.5)
EOSINOPHIL # BLD AUTO: 0.14 K/UL — SIGNIFICANT CHANGE UP (ref 0–0.7)
EOSINOPHIL NFR BLD AUTO: 1.9 % — SIGNIFICANT CHANGE UP (ref 0–8)
GLUCOSE SERPL-MCNC: 135 MG/DL — HIGH (ref 70–99)
HCT VFR BLD CALC: 38.3 % — SIGNIFICANT CHANGE UP (ref 37–47)
HGB BLD-MCNC: 13.1 G/DL — SIGNIFICANT CHANGE UP (ref 12–16)
IMM GRANULOCYTES NFR BLD AUTO: 0.4 % — HIGH (ref 0.1–0.3)
LACTATE SERPL-SCNC: 2.2 MMOL/L — HIGH (ref 0.7–2)
LIDOCAIN IGE QN: 19 U/L — SIGNIFICANT CHANGE UP (ref 7–60)
LYMPHOCYTES # BLD AUTO: 1.85 K/UL — SIGNIFICANT CHANGE UP (ref 1.2–3.4)
LYMPHOCYTES # BLD AUTO: 25.4 % — SIGNIFICANT CHANGE UP (ref 20.5–51.1)
MCHC RBC-ENTMCNC: 31 PG — SIGNIFICANT CHANGE UP (ref 27–31)
MCHC RBC-ENTMCNC: 34.2 G/DL — SIGNIFICANT CHANGE UP (ref 32–37)
MCV RBC AUTO: 90.5 FL — SIGNIFICANT CHANGE UP (ref 81–99)
MONOCYTES # BLD AUTO: 0.68 K/UL — HIGH (ref 0.1–0.6)
MONOCYTES NFR BLD AUTO: 9.3 % — SIGNIFICANT CHANGE UP (ref 1.7–9.3)
NEUTROPHILS # BLD AUTO: 4.52 K/UL — SIGNIFICANT CHANGE UP (ref 1.4–6.5)
NEUTROPHILS NFR BLD AUTO: 62.2 % — SIGNIFICANT CHANGE UP (ref 42.2–75.2)
NRBC # BLD: 0 /100 WBCS — SIGNIFICANT CHANGE UP (ref 0–0)
PLATELET # BLD AUTO: 349 K/UL — SIGNIFICANT CHANGE UP (ref 130–400)
POTASSIUM SERPL-MCNC: 5.6 MMOL/L — HIGH (ref 3.5–5)
POTASSIUM SERPL-SCNC: 5.6 MMOL/L — HIGH (ref 3.5–5)
PROT SERPL-MCNC: 7.7 G/DL — SIGNIFICANT CHANGE UP (ref 6–8)
RBC # BLD: 4.23 M/UL — SIGNIFICANT CHANGE UP (ref 4.2–5.4)
RBC # FLD: 12 % — SIGNIFICANT CHANGE UP (ref 11.5–14.5)
SODIUM SERPL-SCNC: 135 MMOL/L — SIGNIFICANT CHANGE UP (ref 135–146)
TROPONIN T SERPL-MCNC: <0.01 NG/ML — SIGNIFICANT CHANGE UP
WBC # BLD: 7.28 K/UL — SIGNIFICANT CHANGE UP (ref 4.8–10.8)
WBC # FLD AUTO: 7.28 K/UL — SIGNIFICANT CHANGE UP (ref 4.8–10.8)

## 2019-12-30 PROCEDURE — 71046 X-RAY EXAM CHEST 2 VIEWS: CPT | Mod: 26

## 2019-12-30 PROCEDURE — 74177 CT ABD & PELVIS W/CONTRAST: CPT | Mod: 26

## 2019-12-30 PROCEDURE — 99285 EMERGENCY DEPT VISIT HI MDM: CPT

## 2019-12-30 RX ORDER — ERGOCALCIFEROL 1.25 MG/1
1 CAPSULE ORAL
Qty: 0 | Refills: 0 | DISCHARGE

## 2019-12-30 RX ORDER — FAMOTIDINE 10 MG/ML
20 INJECTION INTRAVENOUS ONCE
Refills: 0 | Status: COMPLETED | OUTPATIENT
Start: 2019-12-30 | End: 2019-12-30

## 2019-12-30 RX ORDER — SODIUM CHLORIDE 9 MG/ML
1000 INJECTION INTRAMUSCULAR; INTRAVENOUS; SUBCUTANEOUS ONCE
Refills: 0 | Status: COMPLETED | OUTPATIENT
Start: 2019-12-30 | End: 2019-12-30

## 2019-12-30 RX ORDER — FLUOXETINE HCL 10 MG
1 CAPSULE ORAL
Qty: 0 | Refills: 0 | DISCHARGE

## 2019-12-30 RX ORDER — ONDANSETRON 8 MG/1
4 TABLET, FILM COATED ORAL ONCE
Refills: 0 | Status: COMPLETED | OUTPATIENT
Start: 2019-12-30 | End: 2019-12-30

## 2019-12-30 RX ORDER — ONDANSETRON 8 MG/1
1 TABLET, FILM COATED ORAL
Qty: 21 | Refills: 0
Start: 2019-12-30 | End: 2020-01-05

## 2019-12-30 RX ADMIN — SODIUM CHLORIDE 1000 MILLILITER(S): 9 INJECTION INTRAMUSCULAR; INTRAVENOUS; SUBCUTANEOUS at 04:11

## 2019-12-30 RX ADMIN — ONDANSETRON 4 MILLIGRAM(S): 8 TABLET, FILM COATED ORAL at 04:11

## 2019-12-30 RX ADMIN — FAMOTIDINE 20 MILLIGRAM(S): 10 INJECTION INTRAVENOUS at 04:11

## 2019-12-30 NOTE — ED PROVIDER NOTE - NSFOLLOWUPINSTRUCTIONS_ED_ALL_ED_FT
Acute Abdominal Pain    WHAT YOU NEED TO KNOW:    The cause of your abdominal pain may not be found. If a cause is found, treatment will depend on what the cause is.     DISCHARGE INSTRUCTIONS:    Return to the emergency department if:        You vomit blood or cannot stop vomiting.       You have blood in your bowel movement or it looks like tar.        You have bleeding from your rectum.        Your abdomen is larger than usual, more painful, and hard.        You have severe pain in your abdomen.        You stop passing gas and having bowel movements.        You feel weak, dizzy, or faint.    Contact your healthcare provider if:        You have a fever.       You have new signs and symptoms.       Your symptoms do not get better with treatment.        You have questions or concerns about your condition or care.    Medicines may be given to decrease pain, treat an infection, and manage your symptoms. Take your medicine as directed. Call your healthcare provider if you think your medicine is not helping or if you have side effects. Tell him if you are allergic to any medicine. Keep a list of the medicines, vitamins, and herbs you take. Include the amounts, and when and why you take them. Bring the list or the pill bottles to follow-up visits. Carry your medicine list with you in case of an emergency.    Manage your symptoms:        Apply heat on your abdomen for 20 to 30 minutes every 2 hours for as many days as directed. Heat helps decrease pain and muscle spasms.        Manage your stress. Stress may cause abdominal pain. Your healthcare provider may recommend relaxation techniques and deep breathing exercises to help decrease your stress. Your healthcare provider may recommend you talk to someone about your stress or anxiety, such as a counselor or a trusted friend. Get plenty of sleep and exercise regularly.        Limit or do not drink alcohol. Alcohol can make your abdominal pain worse. Ask your healthcare provider if it is safe for you to drink alcohol. Also ask how much is safe for you to drink.        Do not smoke. Nicotine and other chemicals in cigarettes can damage your esophagus and stomach. Ask your healthcare provider for information if you currently smoke and need help to quit. E-cigarettes or smokeless tobacco still contain nicotine. Talk to your healthcare provider before you use these products.        Make changes to the food you eat as directed: Do not eat foods that cause abdominal pain or other symptoms. Eat small meals more often.        Eat more high-fiber foods if you are constipated. High-fiber foods include fruits, vegetables, whole-grain foods, and legumes.        Do not eat foods that cause gas if you have bloating. Examples include broccoli, cabbage, and cauliflower. Do not drink soda or carbonated drinks, because these may also cause gas.        Do not eat foods or drinks that contain sorbitol or fructose if you have diarrhea and bloating. Some examples are fruit juices, candy, jelly, and sugar-free gum.        Do not eat high-fat foods, such as fried foods, cheeseburgers, hot dogs, and desserts.       Limit or do not drink caffeine. Caffeine may make symptoms, such as heart burn or nausea, worse.        Drink plenty of liquids to prevent dehydration from diarrhea or vomiting. Ask your healthcare provider how much liquid to drink each day and which liquids are best for you.     Follow up with your healthcare provider as directed: Write down your questions so you remember to ask them during your visits.      Acute Nausea and Vomiting    WHAT YOU NEED TO KNOW:    Acute nausea and vomiting start suddenly, worsen quickly, and last a short time.    DISCHARGE INSTRUCTIONS:    Return to the emergency department if:     You see blood in your vomit or your bowel movements.      You have sudden, severe pain in your chest and upper abdomen after hard vomiting or retching.      You have swelling in your neck and chest.       You are dizzy, cold, and thirsty and your eyes and mouth are dry.      You are urinating very little or not at all.      You have muscle weakness, leg cramps, and trouble breathing.       Your heart is beating much faster than normal.       You continue to vomit for more than 48 hours.     Contact your healthcare provider if:     You have frequent dry heaves (vomiting but nothing comes out).      Your nausea and vomiting does not get better or go away after you use medicine.      You have questions or concerns about your condition or treatment.    Medicines: You may need any of the following:     Medicines may be given to calm your stomach and stop your vomiting. You may also need medicines to help you feel more relaxed or to stop nausea and vomiting caused by motion sickness.      Gastrointestinal stimulants are used to help empty your stomach and bowels. This may help decrease nausea and vomiting.      Take your medicine as directed. Contact your healthcare provider if you think your medicine is not helping or if you have side effects. Tell him or her if you are allergic to any medicine. Keep a list of the medicines, vitamins, and herbs you take. Include the amounts, and when and why you take them. Bring the list or the pill bottles to follow-up visits. Carry your medicine list with you in case of an emergency.    Prevent or manage acute nausea and vomiting:     Do not drink alcohol. Alcohol may upset or irritate your stomach. Too much alcohol can also cause acute nausea and vomiting.      Control stress. Headaches due to stress may cause nausea and vomiting. Find ways to relax and manage your stress. Get more rest and sleep.      Drink more liquids as directed. Vomiting can lead to dehydration. It is important to drink more liquids to help replace lost body fluids. Ask your healthcare provider how much liquid to drink each day and which liquids are best for you. Your provider may recommend that you drink an oral rehydration solution (ORS). ORS contains water, salts, and sugar that are needed to replace the lost body fluids. Ask what kind of ORS to use, how much to drink, and where to get it.      Eat smaller meals, more often. Eat small amounts of food every 2 to 3 hours, even if you are not hungry. Food in your stomach may decrease your nausea.      Talk to your healthcare provider before you take over-the-counter (OTC) medicines. These medicines can cause serious problems if you use certain other medicines, or you have a medical condition. You may have problems if you use too much or use them for longer than the label says. Follow directions on the label carefully.     Follow up with your healthcare provider as directed: Write down your questions so you remember to ask them during your follow-up visits.       © Copyright [employer] 2019 All illustrations and images included in CareNotes are the copyrighted property of [employer] or [employer].      Patient advised to f/u with pcp and gi.

## 2019-12-30 NOTE — ED PROVIDER NOTE - OBJECTIVE STATEMENT
60 y/o female with a PMH of hiatal hernia, GERD, and anxiety presents to the ED for evaluation of burning, constant, epigastric pain associated with nausea and nonbloody, nonbilious vomiting x 2 days. Pt states she is not tolerating food or drink PO. Pt admits to taking tums which has not provided relief. Pt admits to dizziness and SOB. pt denies back pain, hx of mi or blood clots, lower extremity swelling, calf pain, visual changes, recent hospitalizations, recent travel, recent surgeries, or diarrhea, constipation, urinary symptoms, or abdominal surgeries.

## 2019-12-30 NOTE — ED PROVIDER NOTE - ATTENDING CONTRIBUTION TO CARE
I was present for and supervised the key and critical aspects of the procedures performed during the care of the patient.  60 y/o female with a PMH of hiatal hernia, GERD, and anxiety presents to the ED for evaluation of burning, constant, epigastric pain associated with nausea and nonbloody, nonbilious vomiting x 2 days. She denies any recent hospitalizations, recent travel, recent surgeries, or diarrhea, constipation, urinary symptoms, or abdominal surgeries.  On physical exam she is nc/at perrla eomi oropharynx clear cta b/l, rrr s1s2 noted abd-soft nt nd bs+ no guarding no rebound ext from with no edema   a/p- we obtained labs, iv fluids as well as ct scan she has improved at this time ct negative

## 2019-12-30 NOTE — ED PROVIDER NOTE - CLINICAL SUMMARY MEDICAL DECISION MAKING FREE TEXT BOX
Patient presents for evaluation of abdominal pain with vomiting and diarrhea she denies any fevers or chills we obtained labs as well as ct she improved at this time I will discharge with follow up to her pcp in addition we discussed indications to return at this time

## 2019-12-30 NOTE — ED ADULT NURSE NOTE - PMH
Anxiety    Bile duct stone with obstruct    Degenerative disc disease, lumbar    GERD (gastroesophageal reflux disease)    Hiatal hernia

## 2019-12-30 NOTE — ED PROVIDER NOTE - PROGRESS NOTE DETAILS
pt admits she is feeling better and nausea has resolved. discussed lab and radiology results with pt. pt is advised to f/u with pcp, and GI. pt is advised of return precautions pt is agreeable to d/c.

## 2019-12-30 NOTE — ED PROVIDER NOTE - PATIENT PORTAL LINK FT
You can access the FollowMyHealth Patient Portal offered by [facility] by registering at the following website: [email or web address]. By joining [facility]’s FollowMyHealth portal, you will also be able to view your health information using other applications (apps) compatible with our system.

## 2019-12-30 NOTE — ED PROVIDER NOTE - PMH
Bile duct stone with obstruct    Degenerative disc disease, lumbar    Hiatal hernia Anxiety    Bile duct stone with obstruct    Degenerative disc disease, lumbar    GERD (gastroesophageal reflux disease)    Hiatal hernia

## 2019-12-30 NOTE — ED PROVIDER NOTE - CARE PROVIDER_API CALL
Red Mcintyre (DO)  Gastroenterology  [address] [place] [id number]  Phone: [phone]  Fax: [phone]  Follow Up Time: 1-3 Days

## 2019-12-30 NOTE — ED PROVIDER NOTE - PHYSICAL EXAMINATION
Physical Exam    Vital Signs: I have reviewed the initial vital signs.  Constitutional: well-nourished, appears stated age. pt appears anxious.   Eyes: Conjunctiva pink, Sclera clear  Cardiovascular: S1 and S2, regular rate, regular rhythm, well-perfused extremities, radial pulses equal and 2+  Respiratory: unlabored respiratory effort, clear to auscultation bilaterally no wheezing, rales and rhonchi. pt is speaking full sentences. no accessory muscle use.   Gastrointestinal: (+) tenderness to the epigastric region. soft, non-distended abdomen, no pulsatile mass, normal bowl sounds. no organomegaly.   Musculoskeletal: no lower extremity edema, no calf tenderness.   Integumentary: warm, dry, no rash  Neurologic: a&o x3

## 2019-12-30 NOTE — ED PROVIDER NOTE - NS ED ROS FT
CONST: No fever, chills or bodyaches  EYES: No pain, redness, drainage or visual changes.  ENT: No ear pain or discharge, nasal discharge or congestion. No sore throat  CARD: No chest pain, palpitations  RESP: (+) SOB. No cough, hemoptysis. No hx of asthma or COPD  GI: (+) abdominal pain, N/V. No diarrhea.   MS: No joint pain, back pain or extremity pain/injury  SKIN: No rashes  NEURO: (+) dizziness. No headache, paresthesias

## 2019-12-31 ENCOUNTER — OUTPATIENT (OUTPATIENT)
Dept: OUTPATIENT SERVICES | Facility: HOSPITAL | Age: 59
LOS: 1 days | Discharge: HOME | End: 2019-12-31

## 2019-12-31 DIAGNOSIS — F33.1 MAJOR DEPRESSIVE DISORDER, RECURRENT, MODERATE: ICD-10-CM

## 2019-12-31 DIAGNOSIS — K80.70 CALCULUS OF GALLBLADDER AND BILE DUCT WITHOUT CHOLECYSTITIS WITHOUT OBSTRUCTION: Chronic | ICD-10-CM

## 2019-12-31 PROBLEM — K21.9 GASTRO-ESOPHAGEAL REFLUX DISEASE WITHOUT ESOPHAGITIS: Chronic | Status: ACTIVE | Noted: 2019-12-30

## 2019-12-31 PROBLEM — F41.9 ANXIETY DISORDER, UNSPECIFIED: Chronic | Status: ACTIVE | Noted: 2019-12-30

## 2020-01-21 ENCOUNTER — OUTPATIENT (OUTPATIENT)
Dept: OUTPATIENT SERVICES | Facility: HOSPITAL | Age: 60
LOS: 1 days | Discharge: HOME | End: 2020-01-21
Payer: MEDICAID

## 2020-01-21 DIAGNOSIS — F33.1 MAJOR DEPRESSIVE DISORDER, RECURRENT, MODERATE: ICD-10-CM

## 2020-01-21 DIAGNOSIS — K80.70 CALCULUS OF GALLBLADDER AND BILE DUCT WITHOUT CHOLECYSTITIS WITHOUT OBSTRUCTION: Chronic | ICD-10-CM

## 2020-01-21 PROCEDURE — 99214 OFFICE O/P EST MOD 30 MIN: CPT

## 2020-01-24 ENCOUNTER — EMERGENCY (EMERGENCY)
Facility: HOSPITAL | Age: 60
LOS: 0 days | Discharge: HOME | End: 2020-01-24
Attending: EMERGENCY MEDICINE | Admitting: EMERGENCY MEDICINE
Payer: MEDICAID

## 2020-01-24 ENCOUNTER — OUTPATIENT (OUTPATIENT)
Dept: OUTPATIENT SERVICES | Facility: HOSPITAL | Age: 60
LOS: 1 days | Discharge: HOME | End: 2020-01-24

## 2020-01-24 VITALS
RESPIRATION RATE: 18 BRPM | DIASTOLIC BLOOD PRESSURE: 61 MMHG | OXYGEN SATURATION: 98 % | HEART RATE: 92 BPM | SYSTOLIC BLOOD PRESSURE: 100 MMHG | TEMPERATURE: 100 F

## 2020-01-24 VITALS
HEIGHT: 66 IN | HEART RATE: 92 BPM | TEMPERATURE: 101 F | DIASTOLIC BLOOD PRESSURE: 84 MMHG | OXYGEN SATURATION: 96 % | RESPIRATION RATE: 18 BRPM | WEIGHT: 128.09 LBS | SYSTOLIC BLOOD PRESSURE: 112 MMHG

## 2020-01-24 DIAGNOSIS — F33.1 MAJOR DEPRESSIVE DISORDER, RECURRENT, MODERATE: ICD-10-CM

## 2020-01-24 DIAGNOSIS — R50.9 FEVER, UNSPECIFIED: ICD-10-CM

## 2020-01-24 DIAGNOSIS — K80.70 CALCULUS OF GALLBLADDER AND BILE DUCT WITHOUT CHOLECYSTITIS WITHOUT OBSTRUCTION: Chronic | ICD-10-CM

## 2020-01-24 DIAGNOSIS — B34.9 VIRAL INFECTION, UNSPECIFIED: ICD-10-CM

## 2020-01-24 DIAGNOSIS — R19.7 DIARRHEA, UNSPECIFIED: ICD-10-CM

## 2020-01-24 DIAGNOSIS — R06.2 WHEEZING: ICD-10-CM

## 2020-01-24 LAB
ALBUMIN SERPL ELPH-MCNC: 4.3 G/DL — SIGNIFICANT CHANGE UP (ref 3.5–5.2)
ALP SERPL-CCNC: 41 U/L — SIGNIFICANT CHANGE UP (ref 30–115)
ALT FLD-CCNC: 16 U/L — SIGNIFICANT CHANGE UP (ref 0–41)
ANION GAP SERPL CALC-SCNC: 11 MMOL/L — SIGNIFICANT CHANGE UP (ref 7–14)
AST SERPL-CCNC: 23 U/L — SIGNIFICANT CHANGE UP (ref 0–41)
BASOPHILS # BLD AUTO: 0.01 K/UL — SIGNIFICANT CHANGE UP (ref 0–0.2)
BASOPHILS NFR BLD AUTO: 0.3 % — SIGNIFICANT CHANGE UP (ref 0–1)
BILIRUB SERPL-MCNC: <0.2 MG/DL — SIGNIFICANT CHANGE UP (ref 0.2–1.2)
BUN SERPL-MCNC: 6 MG/DL — LOW (ref 10–20)
CALCIUM SERPL-MCNC: 8.6 MG/DL — SIGNIFICANT CHANGE UP (ref 8.5–10.1)
CHLORIDE SERPL-SCNC: 97 MMOL/L — LOW (ref 98–110)
CO2 SERPL-SCNC: 30 MMOL/L — SIGNIFICANT CHANGE UP (ref 17–32)
CREAT SERPL-MCNC: 0.8 MG/DL — SIGNIFICANT CHANGE UP (ref 0.7–1.5)
EOSINOPHIL # BLD AUTO: 0 K/UL — SIGNIFICANT CHANGE UP (ref 0–0.7)
EOSINOPHIL NFR BLD AUTO: 0 % — SIGNIFICANT CHANGE UP (ref 0–8)
GLUCOSE SERPL-MCNC: 102 MG/DL — HIGH (ref 70–99)
HCT VFR BLD CALC: 40 % — SIGNIFICANT CHANGE UP (ref 37–47)
HGB BLD-MCNC: 13.3 G/DL — SIGNIFICANT CHANGE UP (ref 12–16)
IMM GRANULOCYTES NFR BLD AUTO: 0.6 % — HIGH (ref 0.1–0.3)
LYMPHOCYTES # BLD AUTO: 0.79 K/UL — LOW (ref 1.2–3.4)
LYMPHOCYTES # BLD AUTO: 22.1 % — SIGNIFICANT CHANGE UP (ref 20.5–51.1)
MAGNESIUM SERPL-MCNC: 2 MG/DL — SIGNIFICANT CHANGE UP (ref 1.8–2.4)
MCHC RBC-ENTMCNC: 30.2 PG — SIGNIFICANT CHANGE UP (ref 27–31)
MCHC RBC-ENTMCNC: 33.3 G/DL — SIGNIFICANT CHANGE UP (ref 32–37)
MCV RBC AUTO: 90.9 FL — SIGNIFICANT CHANGE UP (ref 81–99)
MONOCYTES # BLD AUTO: 0.35 K/UL — SIGNIFICANT CHANGE UP (ref 0.1–0.6)
MONOCYTES NFR BLD AUTO: 9.8 % — HIGH (ref 1.7–9.3)
NEUTROPHILS # BLD AUTO: 2.41 K/UL — SIGNIFICANT CHANGE UP (ref 1.4–6.5)
NEUTROPHILS NFR BLD AUTO: 67.2 % — SIGNIFICANT CHANGE UP (ref 42.2–75.2)
NRBC # BLD: 0 /100 WBCS — SIGNIFICANT CHANGE UP (ref 0–0)
PLATELET # BLD AUTO: 298 K/UL — SIGNIFICANT CHANGE UP (ref 130–400)
POTASSIUM SERPL-MCNC: 3.5 MMOL/L — SIGNIFICANT CHANGE UP (ref 3.5–5)
POTASSIUM SERPL-SCNC: 3.5 MMOL/L — SIGNIFICANT CHANGE UP (ref 3.5–5)
PROT SERPL-MCNC: 6.7 G/DL — SIGNIFICANT CHANGE UP (ref 6–8)
RBC # BLD: 4.4 M/UL — SIGNIFICANT CHANGE UP (ref 4.2–5.4)
RBC # FLD: 12.3 % — SIGNIFICANT CHANGE UP (ref 11.5–14.5)
SODIUM SERPL-SCNC: 138 MMOL/L — SIGNIFICANT CHANGE UP (ref 135–146)
WBC # BLD: 3.58 K/UL — LOW (ref 4.8–10.8)
WBC # FLD AUTO: 3.58 K/UL — LOW (ref 4.8–10.8)

## 2020-01-24 PROCEDURE — 71046 X-RAY EXAM CHEST 2 VIEWS: CPT | Mod: 26

## 2020-01-24 PROCEDURE — 99284 EMERGENCY DEPT VISIT MOD MDM: CPT

## 2020-01-24 RX ORDER — KETOROLAC TROMETHAMINE 30 MG/ML
15 SYRINGE (ML) INJECTION ONCE
Refills: 0 | Status: DISCONTINUED | OUTPATIENT
Start: 2020-01-24 | End: 2020-01-24

## 2020-01-24 RX ORDER — IPRATROPIUM/ALBUTEROL SULFATE 18-103MCG
3 AEROSOL WITH ADAPTER (GRAM) INHALATION ONCE
Refills: 0 | Status: COMPLETED | OUTPATIENT
Start: 2020-01-24 | End: 2020-01-24

## 2020-01-24 RX ORDER — SODIUM CHLORIDE 9 MG/ML
1000 INJECTION INTRAMUSCULAR; INTRAVENOUS; SUBCUTANEOUS ONCE
Refills: 0 | Status: COMPLETED | OUTPATIENT
Start: 2020-01-24 | End: 2020-01-24

## 2020-01-24 RX ORDER — ACETAMINOPHEN 500 MG
975 TABLET ORAL ONCE
Refills: 0 | Status: COMPLETED | OUTPATIENT
Start: 2020-01-24 | End: 2020-01-24

## 2020-01-24 RX ORDER — ALBUTEROL 90 UG/1
2 AEROSOL, METERED ORAL
Qty: 1 | Refills: 0
Start: 2020-01-24 | End: 2020-01-30

## 2020-01-24 RX ADMIN — SODIUM CHLORIDE 2000 MILLILITER(S): 9 INJECTION INTRAMUSCULAR; INTRAVENOUS; SUBCUTANEOUS at 12:48

## 2020-01-24 RX ADMIN — Medication 125 MILLIGRAM(S): at 12:49

## 2020-01-24 RX ADMIN — Medication 975 MILLIGRAM(S): at 12:49

## 2020-01-24 RX ADMIN — Medication 15 MILLIGRAM(S): at 12:49

## 2020-01-24 RX ADMIN — Medication 3 MILLILITER(S): at 12:49

## 2020-01-24 RX ADMIN — Medication 3 MILLILITER(S): at 14:04

## 2020-01-24 NOTE — ED PROVIDER NOTE - OBJECTIVE STATEMENT
61 yo female with h/o GERD, anxiety presents to the ED c/o fever x 5/6 days. Associated with nasal congestion, productive cough with yellow sputum, generalized weakness, sob and diarrhea. Patient went to  5 days ago and was started on flagly for diarrhea. Patient went to  again yesterday - patient was instructed to come to ED at that time for further evaluation but didn't want to. Patient last took advil this morning with minimal improvement. Associated with decreased appetite and nausea. Denies headache, dizziness, visual changes, chest pain, abd pain, vomiting, urinary sxs, flank pain. no recent sick contacts or travel. did not get flu vaccine this years.

## 2020-01-24 NOTE — ED ADULT TRIAGE NOTE - CHIEF COMPLAINT QUOTE
pt c/o generalized bodyaches, chest congestion, nasal congestion, fever, went to [facility] yesterday and was flu negative.

## 2020-01-24 NOTE — ED PROVIDER NOTE - CLINICAL SUMMARY MEDICAL DECISION MAKING FREE TEXT BOX
PNA contemplated: unlikely after review of diagnostic testing. In my opinion, out patient treatment and follow up are appropriate.

## 2020-01-24 NOTE — ED PROVIDER NOTE - ATTENDING CONTRIBUTION TO CARE
ENT negative.  Chest bilateral rhonchi and wheezing.  Heart RR no murmur.  abd NT.  Ext FROM.  =pulses.   dx testing reviewed.

## 2020-01-24 NOTE — ED PROVIDER NOTE - PHYSICAL EXAMINATION
GENERAL:  well appearing, non-toxic female in no acute distress  SKIN: skin warm, pink and dry. MMM. no rash.   ENT:  Airway intact. Patent oropharynx without erythema or exudate. Uvula midline. TMs clear b/l.   NECK: Neck supple. No meningismus, or JVD. Trachea midline  PULM: + scattered wheezing.  Normal respiratory effort. No respiratory distress. no stridor, rales or rhonchi. No retractions  CV: RRR, no M/R/G.   ABD: Soft, non-tender, non-distended. no cva tenderness.  NEURO: A+Ox3, no sensory/motor deficits  PSYCH: appropriate behavior, cooperative.

## 2020-01-24 NOTE — ED PROVIDER NOTE - PATIENT PORTAL LINK FT
You can access the FollowMyHealth Patient Portal offered by [facility] by registering at the following website: [email or web address]. By joining [employer]’s FollowMyHealth portal, you will also be able to view your health information using other applications (apps) compatible with our system.

## 2020-01-24 NOTE — ED ADULT NURSE NOTE - CHIEF COMPLAINT QUOTE
pt c/o generalized bodyaches, chest congestion, nasal congestion, fever, [place] [facility] [place] and was flu negative.

## 2020-01-24 NOTE — ED PROVIDER NOTE - NSFOLLOWUPINSTRUCTIONS_ED_ALL_ED_FT
Viral Respiratory Infection    A viral respiratory infection is an illness that affects parts of the body used for breathing, like the lungs, nose, and throat. It is caused by a germ called a virus. Symptoms can include runny nose, coughing, sneezing, fatigue, body aches, sore throat, fever, or headache. Over the counter medicine can be used to manage the symptoms but the infection typically goes away on its own in 5 to 10 days.     SEEK IMMEDIATE MEDICAL CARE IF YOU HAVE ANY OF THE FOLLOWING SYMPTOMS: shortness of breath, chest pain, fever over 10 days, or lightheadedness/dizziness.    Diarrhea    Diarrhea is frequent loose or watery bowel movements that has many causes. Diarrhea can make you feel weak and cause you to become dehydrated. Diarrhea typically lasts 2–3 days, but can last longer if it is a sign of something more serious. Drink clear fluids to prevent dehydration. Eat bland, easy-to-digest foods as tolerated.     SEEK IMMEDIATE MEDICAL CARE IF YOU HAVE ANY OF THE FOLLOWING SYMPTOMS: high fevers, lightheadedness/dizziness, chest pain, black or bloody stools, shortness of breath, severe abdominal or back pain, or any signs of dehydration.

## 2020-01-24 NOTE — ED PROVIDER NOTE - NS ED ROS FT
Constitutional: + fever, + chills, + generalized weakness.   ENT: + nasal congestion, sore throat. no change in voice, no excessive drooling, no ear pain/discharge, no hearing changes.   Cardiovascular: no chest pain, no sob, no syncope , no palpitations, no peripheral edema  Respiratory: + cough. no shortness of breath, no h/o asthma or COPD.  Gastrointestinal: + nausea, + diarrhea. no vomiting. no abdominal pain.   :  no urinary sxs, no flank pain.  Integumentary: no rash or skin changes. no edema  Neurological: + generalized weakness. no headache, no dizziness, no visual changes, no UE/LE weakness or paresthesias. no change in mental status. no neck pain or stiffness.

## 2020-02-04 ENCOUNTER — OUTPATIENT (OUTPATIENT)
Dept: OUTPATIENT SERVICES | Facility: HOSPITAL | Age: 60
LOS: 1 days | Discharge: HOME | End: 2020-02-04

## 2020-02-04 DIAGNOSIS — F33.1 MAJOR DEPRESSIVE DISORDER, RECURRENT, MODERATE: ICD-10-CM

## 2020-02-04 DIAGNOSIS — K80.70 CALCULUS OF GALLBLADDER AND BILE DUCT WITHOUT CHOLECYSTITIS WITHOUT OBSTRUCTION: Chronic | ICD-10-CM

## 2020-02-11 ENCOUNTER — OUTPATIENT (OUTPATIENT)
Dept: OUTPATIENT SERVICES | Facility: HOSPITAL | Age: 60
LOS: 1 days | Discharge: HOME | End: 2020-02-11

## 2020-02-11 DIAGNOSIS — F33.1 MAJOR DEPRESSIVE DISORDER, RECURRENT, MODERATE: ICD-10-CM

## 2020-02-11 DIAGNOSIS — K80.70 CALCULUS OF GALLBLADDER AND BILE DUCT WITHOUT CHOLECYSTITIS WITHOUT OBSTRUCTION: Chronic | ICD-10-CM

## 2020-02-14 ENCOUNTER — OUTPATIENT (OUTPATIENT)
Dept: OUTPATIENT SERVICES | Facility: HOSPITAL | Age: 60
LOS: 1 days | Discharge: HOME | End: 2020-02-14
Payer: MEDICAID

## 2020-02-14 DIAGNOSIS — F33.1 MAJOR DEPRESSIVE DISORDER, RECURRENT, MODERATE: ICD-10-CM

## 2020-02-14 DIAGNOSIS — K80.70 CALCULUS OF GALLBLADDER AND BILE DUCT WITHOUT CHOLECYSTITIS WITHOUT OBSTRUCTION: Chronic | ICD-10-CM

## 2020-02-14 PROCEDURE — 99213 OFFICE O/P EST LOW 20 MIN: CPT

## 2020-02-19 ENCOUNTER — OUTPATIENT (OUTPATIENT)
Dept: OUTPATIENT SERVICES | Facility: HOSPITAL | Age: 60
LOS: 1 days | Discharge: HOME | End: 2020-02-19

## 2020-02-19 DIAGNOSIS — F33.1 MAJOR DEPRESSIVE DISORDER, RECURRENT, MODERATE: ICD-10-CM

## 2020-02-19 DIAGNOSIS — K80.70 CALCULUS OF GALLBLADDER AND BILE DUCT WITHOUT CHOLECYSTITIS WITHOUT OBSTRUCTION: Chronic | ICD-10-CM

## 2020-02-27 ENCOUNTER — OUTPATIENT (OUTPATIENT)
Dept: OUTPATIENT SERVICES | Facility: HOSPITAL | Age: 60
LOS: 1 days | Discharge: HOME | End: 2020-02-27

## 2020-02-27 DIAGNOSIS — F33.1 MAJOR DEPRESSIVE DISORDER, RECURRENT, MODERATE: ICD-10-CM

## 2020-02-27 DIAGNOSIS — K80.70 CALCULUS OF GALLBLADDER AND BILE DUCT WITHOUT CHOLECYSTITIS WITHOUT OBSTRUCTION: Chronic | ICD-10-CM

## 2020-03-09 ENCOUNTER — OUTPATIENT (OUTPATIENT)
Dept: OUTPATIENT SERVICES | Facility: HOSPITAL | Age: 60
LOS: 1 days | Discharge: HOME | End: 2020-03-09

## 2020-03-09 DIAGNOSIS — K80.70 CALCULUS OF GALLBLADDER AND BILE DUCT WITHOUT CHOLECYSTITIS WITHOUT OBSTRUCTION: Chronic | ICD-10-CM

## 2020-03-09 DIAGNOSIS — F33.1 MAJOR DEPRESSIVE DISORDER, RECURRENT, MODERATE: ICD-10-CM

## 2020-03-12 ENCOUNTER — OUTPATIENT (OUTPATIENT)
Dept: OUTPATIENT SERVICES | Facility: HOSPITAL | Age: 60
LOS: 1 days | Discharge: HOME | End: 2020-03-12
Payer: MEDICAID

## 2020-03-12 DIAGNOSIS — F33.1 MAJOR DEPRESSIVE DISORDER, RECURRENT, MODERATE: ICD-10-CM

## 2020-03-12 DIAGNOSIS — K80.70 CALCULUS OF GALLBLADDER AND BILE DUCT WITHOUT CHOLECYSTITIS WITHOUT OBSTRUCTION: Chronic | ICD-10-CM

## 2020-03-12 PROCEDURE — 99213 OFFICE O/P EST LOW 20 MIN: CPT

## 2020-03-16 ENCOUNTER — OUTPATIENT (OUTPATIENT)
Dept: OUTPATIENT SERVICES | Facility: HOSPITAL | Age: 60
LOS: 1 days | Discharge: HOME | End: 2020-03-16

## 2020-03-16 DIAGNOSIS — F33.1 MAJOR DEPRESSIVE DISORDER, RECURRENT, MODERATE: ICD-10-CM

## 2020-03-16 DIAGNOSIS — K80.70 CALCULUS OF GALLBLADDER AND BILE DUCT WITHOUT CHOLECYSTITIS WITHOUT OBSTRUCTION: Chronic | ICD-10-CM

## 2020-03-24 ENCOUNTER — OUTPATIENT (OUTPATIENT)
Dept: OUTPATIENT SERVICES | Facility: HOSPITAL | Age: 60
LOS: 1 days | Discharge: HOME | End: 2020-03-24

## 2020-03-24 DIAGNOSIS — F33.1 MAJOR DEPRESSIVE DISORDER, RECURRENT, MODERATE: ICD-10-CM

## 2020-03-24 DIAGNOSIS — K80.70 CALCULUS OF GALLBLADDER AND BILE DUCT WITHOUT CHOLECYSTITIS WITHOUT OBSTRUCTION: Chronic | ICD-10-CM

## 2020-04-02 ENCOUNTER — OUTPATIENT (OUTPATIENT)
Dept: OUTPATIENT SERVICES | Facility: HOSPITAL | Age: 60
LOS: 1 days | Discharge: HOME | End: 2020-04-02

## 2020-04-02 DIAGNOSIS — K80.70 CALCULUS OF GALLBLADDER AND BILE DUCT WITHOUT CHOLECYSTITIS WITHOUT OBSTRUCTION: Chronic | ICD-10-CM

## 2020-04-03 DIAGNOSIS — F33.1 MAJOR DEPRESSIVE DISORDER, RECURRENT, MODERATE: ICD-10-CM

## 2020-04-10 ENCOUNTER — OUTPATIENT (OUTPATIENT)
Dept: OUTPATIENT SERVICES | Facility: HOSPITAL | Age: 60
LOS: 1 days | Discharge: HOME | End: 2020-04-10

## 2020-04-10 DIAGNOSIS — K80.70 CALCULUS OF GALLBLADDER AND BILE DUCT WITHOUT CHOLECYSTITIS WITHOUT OBSTRUCTION: Chronic | ICD-10-CM

## 2020-04-13 DIAGNOSIS — F33.1 MAJOR DEPRESSIVE DISORDER, RECURRENT, MODERATE: ICD-10-CM

## 2020-04-14 ENCOUNTER — OUTPATIENT (OUTPATIENT)
Dept: OUTPATIENT SERVICES | Facility: HOSPITAL | Age: 60
LOS: 1 days | Discharge: HOME | End: 2020-04-14

## 2020-04-14 DIAGNOSIS — K80.70 CALCULUS OF GALLBLADDER AND BILE DUCT WITHOUT CHOLECYSTITIS WITHOUT OBSTRUCTION: Chronic | ICD-10-CM

## 2020-04-14 DIAGNOSIS — F33.1 MAJOR DEPRESSIVE DISORDER, RECURRENT, MODERATE: ICD-10-CM

## 2020-04-16 ENCOUNTER — OUTPATIENT (OUTPATIENT)
Dept: OUTPATIENT SERVICES | Facility: HOSPITAL | Age: 60
LOS: 1 days | Discharge: HOME | End: 2020-04-16

## 2020-04-16 DIAGNOSIS — K80.70 CALCULUS OF GALLBLADDER AND BILE DUCT WITHOUT CHOLECYSTITIS WITHOUT OBSTRUCTION: Chronic | ICD-10-CM

## 2020-04-17 DIAGNOSIS — F33.1 MAJOR DEPRESSIVE DISORDER, RECURRENT, MODERATE: ICD-10-CM

## 2020-04-24 ENCOUNTER — OUTPATIENT (OUTPATIENT)
Dept: OUTPATIENT SERVICES | Facility: HOSPITAL | Age: 60
LOS: 1 days | Discharge: HOME | End: 2020-04-24

## 2020-04-24 DIAGNOSIS — F33.1 MAJOR DEPRESSIVE DISORDER, RECURRENT, MODERATE: ICD-10-CM

## 2020-04-24 DIAGNOSIS — K80.70 CALCULUS OF GALLBLADDER AND BILE DUCT WITHOUT CHOLECYSTITIS WITHOUT OBSTRUCTION: Chronic | ICD-10-CM

## 2020-05-01 ENCOUNTER — OUTPATIENT (OUTPATIENT)
Dept: OUTPATIENT SERVICES | Facility: HOSPITAL | Age: 60
LOS: 1 days | Discharge: HOME | End: 2020-05-01

## 2020-05-01 DIAGNOSIS — K80.70 CALCULUS OF GALLBLADDER AND BILE DUCT WITHOUT CHOLECYSTITIS WITHOUT OBSTRUCTION: Chronic | ICD-10-CM

## 2020-05-04 DIAGNOSIS — F33.1 MAJOR DEPRESSIVE DISORDER, RECURRENT, MODERATE: ICD-10-CM

## 2020-05-08 ENCOUNTER — OUTPATIENT (OUTPATIENT)
Dept: OUTPATIENT SERVICES | Facility: HOSPITAL | Age: 60
LOS: 1 days | Discharge: HOME | End: 2020-05-08

## 2020-05-08 DIAGNOSIS — F33.1 MAJOR DEPRESSIVE DISORDER, RECURRENT, MODERATE: ICD-10-CM

## 2020-05-08 DIAGNOSIS — K80.70 CALCULUS OF GALLBLADDER AND BILE DUCT WITHOUT CHOLECYSTITIS WITHOUT OBSTRUCTION: Chronic | ICD-10-CM

## 2020-05-15 ENCOUNTER — OUTPATIENT (OUTPATIENT)
Dept: OUTPATIENT SERVICES | Facility: HOSPITAL | Age: 60
LOS: 1 days | Discharge: HOME | End: 2020-05-15
Payer: MEDICAID

## 2020-05-15 DIAGNOSIS — F33.1 MAJOR DEPRESSIVE DISORDER, RECURRENT, MODERATE: ICD-10-CM

## 2020-05-15 DIAGNOSIS — K80.70 CALCULUS OF GALLBLADDER AND BILE DUCT WITHOUT CHOLECYSTITIS WITHOUT OBSTRUCTION: Chronic | ICD-10-CM

## 2020-05-15 PROCEDURE — 99443: CPT

## 2020-05-26 ENCOUNTER — OUTPATIENT (OUTPATIENT)
Dept: OUTPATIENT SERVICES | Facility: HOSPITAL | Age: 60
LOS: 1 days | Discharge: HOME | End: 2020-05-26

## 2020-05-26 DIAGNOSIS — K80.70 CALCULUS OF GALLBLADDER AND BILE DUCT WITHOUT CHOLECYSTITIS WITHOUT OBSTRUCTION: Chronic | ICD-10-CM

## 2020-05-26 DIAGNOSIS — F33.1 MAJOR DEPRESSIVE DISORDER, RECURRENT, MODERATE: ICD-10-CM

## 2020-06-08 ENCOUNTER — OUTPATIENT (OUTPATIENT)
Dept: OUTPATIENT SERVICES | Facility: HOSPITAL | Age: 60
LOS: 1 days | Discharge: HOME | End: 2020-06-08

## 2020-06-08 DIAGNOSIS — K80.70 CALCULUS OF GALLBLADDER AND BILE DUCT WITHOUT CHOLECYSTITIS WITHOUT OBSTRUCTION: Chronic | ICD-10-CM

## 2020-06-08 DIAGNOSIS — F33.1 MAJOR DEPRESSIVE DISORDER, RECURRENT, MODERATE: ICD-10-CM

## 2020-06-18 ENCOUNTER — OUTPATIENT (OUTPATIENT)
Dept: OUTPATIENT SERVICES | Facility: HOSPITAL | Age: 60
LOS: 1 days | Discharge: HOME | End: 2020-06-18
Payer: MEDICAID

## 2020-06-18 DIAGNOSIS — K80.70 CALCULUS OF GALLBLADDER AND BILE DUCT WITHOUT CHOLECYSTITIS WITHOUT OBSTRUCTION: Chronic | ICD-10-CM

## 2020-06-18 DIAGNOSIS — F33.1 MAJOR DEPRESSIVE DISORDER, RECURRENT, MODERATE: ICD-10-CM

## 2020-06-18 PROCEDURE — 99443: CPT

## 2020-06-29 ENCOUNTER — OUTPATIENT (OUTPATIENT)
Dept: OUTPATIENT SERVICES | Facility: HOSPITAL | Age: 60
LOS: 1 days | Discharge: HOME | End: 2020-06-29

## 2020-06-29 DIAGNOSIS — F33.1 MAJOR DEPRESSIVE DISORDER, RECURRENT, MODERATE: ICD-10-CM

## 2020-06-29 DIAGNOSIS — K80.70 CALCULUS OF GALLBLADDER AND BILE DUCT WITHOUT CHOLECYSTITIS WITHOUT OBSTRUCTION: Chronic | ICD-10-CM

## 2020-07-13 ENCOUNTER — OUTPATIENT (OUTPATIENT)
Dept: OUTPATIENT SERVICES | Facility: HOSPITAL | Age: 60
LOS: 1 days | Discharge: HOME | End: 2020-07-13

## 2020-07-13 DIAGNOSIS — K80.70 CALCULUS OF GALLBLADDER AND BILE DUCT WITHOUT CHOLECYSTITIS WITHOUT OBSTRUCTION: Chronic | ICD-10-CM

## 2020-07-13 DIAGNOSIS — F33.1 MAJOR DEPRESSIVE DISORDER, RECURRENT, MODERATE: ICD-10-CM

## 2020-07-23 ENCOUNTER — OUTPATIENT (OUTPATIENT)
Dept: OUTPATIENT SERVICES | Facility: HOSPITAL | Age: 60
LOS: 1 days | Discharge: HOME | End: 2020-07-23
Payer: MEDICAID

## 2020-07-23 DIAGNOSIS — F33.1 MAJOR DEPRESSIVE DISORDER, RECURRENT, MODERATE: ICD-10-CM

## 2020-07-23 DIAGNOSIS — K80.70 CALCULUS OF GALLBLADDER AND BILE DUCT WITHOUT CHOLECYSTITIS WITHOUT OBSTRUCTION: Chronic | ICD-10-CM

## 2020-07-23 PROCEDURE — 99213 OFFICE O/P EST LOW 20 MIN: CPT | Mod: 95

## 2020-07-24 ENCOUNTER — OUTPATIENT (OUTPATIENT)
Dept: OUTPATIENT SERVICES | Facility: HOSPITAL | Age: 60
LOS: 1 days | Discharge: HOME | End: 2020-07-24

## 2020-07-24 DIAGNOSIS — F33.1 MAJOR DEPRESSIVE DISORDER, RECURRENT, MODERATE: ICD-10-CM

## 2020-07-24 DIAGNOSIS — K80.70 CALCULUS OF GALLBLADDER AND BILE DUCT WITHOUT CHOLECYSTITIS WITHOUT OBSTRUCTION: Chronic | ICD-10-CM

## 2020-08-07 ENCOUNTER — OUTPATIENT (OUTPATIENT)
Dept: OUTPATIENT SERVICES | Facility: HOSPITAL | Age: 60
LOS: 1 days | Discharge: HOME | End: 2020-08-07

## 2020-08-07 DIAGNOSIS — F33.1 MAJOR DEPRESSIVE DISORDER, RECURRENT, MODERATE: ICD-10-CM

## 2020-08-07 DIAGNOSIS — K80.70 CALCULUS OF GALLBLADDER AND BILE DUCT WITHOUT CHOLECYSTITIS WITHOUT OBSTRUCTION: Chronic | ICD-10-CM

## 2020-08-17 ENCOUNTER — OUTPATIENT (OUTPATIENT)
Dept: OUTPATIENT SERVICES | Facility: HOSPITAL | Age: 60
LOS: 1 days | Discharge: HOME | End: 2020-08-17

## 2020-08-17 DIAGNOSIS — K80.70 CALCULUS OF GALLBLADDER AND BILE DUCT WITHOUT CHOLECYSTITIS WITHOUT OBSTRUCTION: Chronic | ICD-10-CM

## 2020-08-18 DIAGNOSIS — F33.1 MAJOR DEPRESSIVE DISORDER, RECURRENT, MODERATE: ICD-10-CM

## 2020-08-21 ENCOUNTER — OUTPATIENT (OUTPATIENT)
Dept: OUTPATIENT SERVICES | Facility: HOSPITAL | Age: 60
LOS: 1 days | Discharge: HOME | End: 2020-08-21
Payer: MEDICAID

## 2020-08-21 DIAGNOSIS — K80.70 CALCULUS OF GALLBLADDER AND BILE DUCT WITHOUT CHOLECYSTITIS WITHOUT OBSTRUCTION: Chronic | ICD-10-CM

## 2020-08-21 DIAGNOSIS — F33.1 MAJOR DEPRESSIVE DISORDER, RECURRENT, MODERATE: ICD-10-CM

## 2020-08-21 PROCEDURE — 99213 OFFICE O/P EST LOW 20 MIN: CPT | Mod: 95

## 2020-08-27 ENCOUNTER — OUTPATIENT (OUTPATIENT)
Dept: OUTPATIENT SERVICES | Facility: HOSPITAL | Age: 60
LOS: 1 days | Discharge: HOME | End: 2020-08-27

## 2020-08-27 DIAGNOSIS — F33.1 MAJOR DEPRESSIVE DISORDER, RECURRENT, MODERATE: ICD-10-CM

## 2020-08-27 DIAGNOSIS — K80.70 CALCULUS OF GALLBLADDER AND BILE DUCT WITHOUT CHOLECYSTITIS WITHOUT OBSTRUCTION: Chronic | ICD-10-CM

## 2020-09-07 NOTE — ED ADULT NURSE NOTE - NS ED NOTE  TALK SOMEONE YN
Nursing Transfer Note      9/7/2020     Transfer To: 309    Transfer via stretcher    Transfer with  to O2, cardiac monitoring    Transported by CHANTAL Gresham RN    Medicines sent: yes    Chart send with patient: Yes    Notified: son ; Left voicemail    Patient reassessed at: 09/07/2020 1800    Upon arrival to floor: cardiac monitor applied, patient oriented to room, call bell in reach and bed in lowest position  
No

## 2020-09-14 ENCOUNTER — OUTPATIENT (OUTPATIENT)
Dept: OUTPATIENT SERVICES | Facility: HOSPITAL | Age: 60
LOS: 1 days | Discharge: HOME | End: 2020-09-14

## 2020-09-14 DIAGNOSIS — F33.1 MAJOR DEPRESSIVE DISORDER, RECURRENT, MODERATE: ICD-10-CM

## 2020-09-14 DIAGNOSIS — K80.70 CALCULUS OF GALLBLADDER AND BILE DUCT WITHOUT CHOLECYSTITIS WITHOUT OBSTRUCTION: Chronic | ICD-10-CM

## 2020-09-17 ENCOUNTER — OUTPATIENT (OUTPATIENT)
Dept: OUTPATIENT SERVICES | Facility: HOSPITAL | Age: 60
LOS: 1 days | Discharge: HOME | End: 2020-09-17
Payer: MEDICAID

## 2020-09-17 DIAGNOSIS — F33.1 MAJOR DEPRESSIVE DISORDER, RECURRENT, MODERATE: ICD-10-CM

## 2020-09-17 DIAGNOSIS — K80.70 CALCULUS OF GALLBLADDER AND BILE DUCT WITHOUT CHOLECYSTITIS WITHOUT OBSTRUCTION: Chronic | ICD-10-CM

## 2020-09-17 PROCEDURE — 99213 OFFICE O/P EST LOW 20 MIN: CPT | Mod: 95

## 2020-09-28 ENCOUNTER — OUTPATIENT (OUTPATIENT)
Dept: OUTPATIENT SERVICES | Facility: HOSPITAL | Age: 60
LOS: 1 days | Discharge: HOME | End: 2020-09-28

## 2020-09-28 DIAGNOSIS — F33.1 MAJOR DEPRESSIVE DISORDER, RECURRENT, MODERATE: ICD-10-CM

## 2020-09-28 DIAGNOSIS — K80.70 CALCULUS OF GALLBLADDER AND BILE DUCT WITHOUT CHOLECYSTITIS WITHOUT OBSTRUCTION: Chronic | ICD-10-CM

## 2020-10-09 ENCOUNTER — OUTPATIENT (OUTPATIENT)
Dept: OUTPATIENT SERVICES | Facility: HOSPITAL | Age: 60
LOS: 1 days | Discharge: HOME | End: 2020-10-09

## 2020-10-09 DIAGNOSIS — K80.70 CALCULUS OF GALLBLADDER AND BILE DUCT WITHOUT CHOLECYSTITIS WITHOUT OBSTRUCTION: Chronic | ICD-10-CM

## 2020-10-09 DIAGNOSIS — F33.1 MAJOR DEPRESSIVE DISORDER, RECURRENT, MODERATE: ICD-10-CM

## 2020-10-15 ENCOUNTER — OUTPATIENT (OUTPATIENT)
Dept: OUTPATIENT SERVICES | Facility: HOSPITAL | Age: 60
LOS: 1 days | Discharge: HOME | End: 2020-10-15
Payer: MEDICAID

## 2020-10-15 DIAGNOSIS — K80.70 CALCULUS OF GALLBLADDER AND BILE DUCT WITHOUT CHOLECYSTITIS WITHOUT OBSTRUCTION: Chronic | ICD-10-CM

## 2020-10-15 DIAGNOSIS — F33.1 MAJOR DEPRESSIVE DISORDER, RECURRENT, MODERATE: ICD-10-CM

## 2020-10-15 PROCEDURE — 99442: CPT

## 2020-10-23 ENCOUNTER — OUTPATIENT (OUTPATIENT)
Dept: OUTPATIENT SERVICES | Facility: HOSPITAL | Age: 60
LOS: 1 days | Discharge: HOME | End: 2020-10-23

## 2020-10-23 DIAGNOSIS — F33.1 MAJOR DEPRESSIVE DISORDER, RECURRENT, MODERATE: ICD-10-CM

## 2020-10-23 DIAGNOSIS — K80.70 CALCULUS OF GALLBLADDER AND BILE DUCT WITHOUT CHOLECYSTITIS WITHOUT OBSTRUCTION: Chronic | ICD-10-CM

## 2020-11-06 ENCOUNTER — OUTPATIENT (OUTPATIENT)
Dept: OUTPATIENT SERVICES | Facility: HOSPITAL | Age: 60
LOS: 1 days | Discharge: HOME | End: 2020-11-06

## 2020-11-06 DIAGNOSIS — K80.70 CALCULUS OF GALLBLADDER AND BILE DUCT WITHOUT CHOLECYSTITIS WITHOUT OBSTRUCTION: Chronic | ICD-10-CM

## 2020-11-06 DIAGNOSIS — F33.1 MAJOR DEPRESSIVE DISORDER, RECURRENT, MODERATE: ICD-10-CM

## 2020-11-13 ENCOUNTER — OUTPATIENT (OUTPATIENT)
Dept: OUTPATIENT SERVICES | Facility: HOSPITAL | Age: 60
LOS: 1 days | Discharge: HOME | End: 2020-11-13

## 2020-11-13 DIAGNOSIS — F33.1 MAJOR DEPRESSIVE DISORDER, RECURRENT, MODERATE: ICD-10-CM

## 2020-11-13 DIAGNOSIS — K80.70 CALCULUS OF GALLBLADDER AND BILE DUCT WITHOUT CHOLECYSTITIS WITHOUT OBSTRUCTION: Chronic | ICD-10-CM

## 2020-11-17 ENCOUNTER — APPOINTMENT (OUTPATIENT)
Dept: PSYCHIATRY | Facility: CLINIC | Age: 60
End: 2020-11-17

## 2020-11-17 ENCOUNTER — OUTPATIENT (OUTPATIENT)
Dept: OUTPATIENT SERVICES | Facility: HOSPITAL | Age: 60
LOS: 1 days | Discharge: HOME | End: 2020-11-17
Payer: MEDICAID

## 2020-11-17 DIAGNOSIS — K80.70 CALCULUS OF GALLBLADDER AND BILE DUCT WITHOUT CHOLECYSTITIS WITHOUT OBSTRUCTION: Chronic | ICD-10-CM

## 2020-11-17 DIAGNOSIS — F33.1 MAJOR DEPRESSIVE DISORDER, RECURRENT, MODERATE: ICD-10-CM

## 2020-11-17 PROCEDURE — 99214 OFFICE O/P EST MOD 30 MIN: CPT | Mod: 95

## 2020-11-27 ENCOUNTER — APPOINTMENT (OUTPATIENT)
Dept: PSYCHIATRY | Facility: CLINIC | Age: 60
End: 2020-11-27

## 2020-12-14 ENCOUNTER — NON-APPOINTMENT (OUTPATIENT)
Age: 60
End: 2020-12-14

## 2020-12-14 DIAGNOSIS — Z81.8 FAMILY HISTORY OF OTHER MENTAL AND BEHAVIORAL DISORDERS: ICD-10-CM

## 2020-12-14 DIAGNOSIS — Z91.5 PERSONAL HISTORY OF SELF-HARM: ICD-10-CM

## 2020-12-14 DIAGNOSIS — M19.90 UNSPECIFIED OSTEOARTHRITIS, UNSPECIFIED SITE: ICD-10-CM

## 2020-12-14 DIAGNOSIS — Z62.810 PERSONAL HISTORY OF PHYSICAL AND SEXUAL ABUSE IN CHILDHOOD: ICD-10-CM

## 2020-12-14 PROBLEM — Z00.00 ENCOUNTER FOR PREVENTIVE HEALTH EXAMINATION: Status: ACTIVE | Noted: 2020-12-14

## 2020-12-18 ENCOUNTER — APPOINTMENT (OUTPATIENT)
Dept: PSYCHIATRY | Facility: CLINIC | Age: 60
End: 2020-12-18
Payer: MEDICAID

## 2020-12-18 ENCOUNTER — OUTPATIENT (OUTPATIENT)
Dept: OUTPATIENT SERVICES | Facility: HOSPITAL | Age: 60
LOS: 1 days | Discharge: HOME | End: 2020-12-18

## 2020-12-18 DIAGNOSIS — K80.70 CALCULUS OF GALLBLADDER AND BILE DUCT WITHOUT CHOLECYSTITIS WITHOUT OBSTRUCTION: Chronic | ICD-10-CM

## 2020-12-18 DIAGNOSIS — F33.1 MAJOR DEPRESSIVE DISORDER, RECURRENT, MODERATE: ICD-10-CM

## 2020-12-18 PROCEDURE — 99214 OFFICE O/P EST MOD 30 MIN: CPT | Mod: 95

## 2021-01-08 ENCOUNTER — OUTPATIENT (OUTPATIENT)
Dept: OUTPATIENT SERVICES | Facility: HOSPITAL | Age: 61
LOS: 1 days | Discharge: HOME | End: 2021-01-08

## 2021-01-08 ENCOUNTER — APPOINTMENT (OUTPATIENT)
Dept: PSYCHIATRY | Facility: CLINIC | Age: 61
End: 2021-01-08

## 2021-01-08 DIAGNOSIS — F33.1 MAJOR DEPRESSIVE DISORDER, RECURRENT, MODERATE: ICD-10-CM

## 2021-01-08 DIAGNOSIS — K80.70 CALCULUS OF GALLBLADDER AND BILE DUCT WITHOUT CHOLECYSTITIS WITHOUT OBSTRUCTION: Chronic | ICD-10-CM

## 2021-01-14 ENCOUNTER — APPOINTMENT (OUTPATIENT)
Dept: PSYCHIATRY | Facility: CLINIC | Age: 61
End: 2021-01-14
Payer: MEDICAID

## 2021-01-14 ENCOUNTER — OUTPATIENT (OUTPATIENT)
Dept: OUTPATIENT SERVICES | Facility: HOSPITAL | Age: 61
LOS: 1 days | Discharge: HOME | End: 2021-01-14

## 2021-01-14 DIAGNOSIS — K80.70 CALCULUS OF GALLBLADDER AND BILE DUCT WITHOUT CHOLECYSTITIS WITHOUT OBSTRUCTION: Chronic | ICD-10-CM

## 2021-01-14 DIAGNOSIS — F33.1 MAJOR DEPRESSIVE DISORDER, RECURRENT, MODERATE: ICD-10-CM

## 2021-01-14 PROCEDURE — 99214 OFFICE O/P EST MOD 30 MIN: CPT | Mod: 95

## 2021-01-21 ENCOUNTER — OUTPATIENT (OUTPATIENT)
Dept: OUTPATIENT SERVICES | Facility: HOSPITAL | Age: 61
LOS: 1 days | Discharge: HOME | End: 2021-01-21

## 2021-01-21 ENCOUNTER — APPOINTMENT (OUTPATIENT)
Dept: PSYCHIATRY | Facility: CLINIC | Age: 61
End: 2021-01-21

## 2021-01-21 DIAGNOSIS — F33.1 MAJOR DEPRESSIVE DISORDER, RECURRENT, MODERATE: ICD-10-CM

## 2021-01-21 DIAGNOSIS — K80.70 CALCULUS OF GALLBLADDER AND BILE DUCT WITHOUT CHOLECYSTITIS WITHOUT OBSTRUCTION: Chronic | ICD-10-CM

## 2021-01-29 ENCOUNTER — APPOINTMENT (OUTPATIENT)
Dept: PSYCHIATRY | Facility: CLINIC | Age: 61
End: 2021-01-29

## 2021-01-29 ENCOUNTER — OUTPATIENT (OUTPATIENT)
Dept: OUTPATIENT SERVICES | Facility: HOSPITAL | Age: 61
LOS: 1 days | Discharge: HOME | End: 2021-01-29

## 2021-01-29 DIAGNOSIS — K80.70 CALCULUS OF GALLBLADDER AND BILE DUCT WITHOUT CHOLECYSTITIS WITHOUT OBSTRUCTION: Chronic | ICD-10-CM

## 2021-01-29 DIAGNOSIS — F33.1 MAJOR DEPRESSIVE DISORDER, RECURRENT, MODERATE: ICD-10-CM

## 2021-02-01 ENCOUNTER — NON-APPOINTMENT (OUTPATIENT)
Age: 61
End: 2021-02-01

## 2021-02-04 ENCOUNTER — APPOINTMENT (OUTPATIENT)
Dept: PSYCHIATRY | Facility: CLINIC | Age: 61
End: 2021-02-04

## 2021-02-17 ENCOUNTER — NON-APPOINTMENT (OUTPATIENT)
Age: 61
End: 2021-02-17

## 2021-02-25 ENCOUNTER — OUTPATIENT (OUTPATIENT)
Dept: OUTPATIENT SERVICES | Facility: HOSPITAL | Age: 61
LOS: 1 days | Discharge: HOME | End: 2021-02-25

## 2021-02-25 ENCOUNTER — APPOINTMENT (OUTPATIENT)
Dept: PSYCHIATRY | Facility: CLINIC | Age: 61
End: 2021-02-25
Payer: MEDICAID

## 2021-02-25 DIAGNOSIS — K80.70 CALCULUS OF GALLBLADDER AND BILE DUCT WITHOUT CHOLECYSTITIS WITHOUT OBSTRUCTION: Chronic | ICD-10-CM

## 2021-02-25 DIAGNOSIS — F33.1 MAJOR DEPRESSIVE DISORDER, RECURRENT, MODERATE: ICD-10-CM

## 2021-02-25 PROCEDURE — 99213 OFFICE O/P EST LOW 20 MIN: CPT | Mod: 95

## 2021-03-01 ENCOUNTER — NON-APPOINTMENT (OUTPATIENT)
Age: 61
End: 2021-03-01

## 2021-03-01 ENCOUNTER — APPOINTMENT (OUTPATIENT)
Dept: PSYCHIATRY | Facility: CLINIC | Age: 61
End: 2021-03-01

## 2021-03-01 ENCOUNTER — OUTPATIENT (OUTPATIENT)
Dept: OUTPATIENT SERVICES | Facility: HOSPITAL | Age: 61
LOS: 1 days | Discharge: HOME | End: 2021-03-01

## 2021-03-01 DIAGNOSIS — K80.70 CALCULUS OF GALLBLADDER AND BILE DUCT WITHOUT CHOLECYSTITIS WITHOUT OBSTRUCTION: Chronic | ICD-10-CM

## 2021-03-01 DIAGNOSIS — F33.1 MAJOR DEPRESSIVE DISORDER, RECURRENT, MODERATE: ICD-10-CM

## 2021-03-15 ENCOUNTER — OUTPATIENT (OUTPATIENT)
Dept: OUTPATIENT SERVICES | Facility: HOSPITAL | Age: 61
LOS: 1 days | Discharge: HOME | End: 2021-03-15

## 2021-03-15 ENCOUNTER — APPOINTMENT (OUTPATIENT)
Dept: PSYCHIATRY | Facility: CLINIC | Age: 61
End: 2021-03-15

## 2021-03-15 DIAGNOSIS — K80.70 CALCULUS OF GALLBLADDER AND BILE DUCT WITHOUT CHOLECYSTITIS WITHOUT OBSTRUCTION: Chronic | ICD-10-CM

## 2021-03-15 DIAGNOSIS — F33.1 MAJOR DEPRESSIVE DISORDER, RECURRENT, MODERATE: ICD-10-CM

## 2021-04-06 ENCOUNTER — APPOINTMENT (OUTPATIENT)
Dept: PSYCHIATRY | Facility: CLINIC | Age: 61
End: 2021-04-06

## 2021-04-06 ENCOUNTER — OUTPATIENT (OUTPATIENT)
Dept: OUTPATIENT SERVICES | Facility: HOSPITAL | Age: 61
LOS: 1 days | Discharge: HOME | End: 2021-04-06

## 2021-04-06 DIAGNOSIS — K80.70 CALCULUS OF GALLBLADDER AND BILE DUCT WITHOUT CHOLECYSTITIS WITHOUT OBSTRUCTION: Chronic | ICD-10-CM

## 2021-04-06 DIAGNOSIS — F33.1 MAJOR DEPRESSIVE DISORDER, RECURRENT, MODERATE: ICD-10-CM

## 2021-04-12 ENCOUNTER — NON-APPOINTMENT (OUTPATIENT)
Age: 61
End: 2021-04-12

## 2021-04-13 ENCOUNTER — OUTPATIENT (OUTPATIENT)
Dept: OUTPATIENT SERVICES | Facility: HOSPITAL | Age: 61
LOS: 1 days | Discharge: HOME | End: 2021-04-13

## 2021-04-13 ENCOUNTER — APPOINTMENT (OUTPATIENT)
Dept: PSYCHIATRY | Facility: CLINIC | Age: 61
End: 2021-04-13
Payer: MEDICAID

## 2021-04-13 DIAGNOSIS — K80.70 CALCULUS OF GALLBLADDER AND BILE DUCT WITHOUT CHOLECYSTITIS WITHOUT OBSTRUCTION: Chronic | ICD-10-CM

## 2021-04-13 DIAGNOSIS — F33.1 MAJOR DEPRESSIVE DISORDER, RECURRENT, MODERATE: ICD-10-CM

## 2021-04-13 PROCEDURE — 99213 OFFICE O/P EST LOW 20 MIN: CPT | Mod: 95

## 2021-04-20 ENCOUNTER — APPOINTMENT (OUTPATIENT)
Dept: PSYCHIATRY | Facility: CLINIC | Age: 61
End: 2021-04-20

## 2021-04-20 ENCOUNTER — OUTPATIENT (OUTPATIENT)
Dept: OUTPATIENT SERVICES | Facility: HOSPITAL | Age: 61
LOS: 1 days | Discharge: HOME | End: 2021-04-20

## 2021-04-20 DIAGNOSIS — K80.70 CALCULUS OF GALLBLADDER AND BILE DUCT WITHOUT CHOLECYSTITIS WITHOUT OBSTRUCTION: Chronic | ICD-10-CM

## 2021-04-20 DIAGNOSIS — F33.1 MAJOR DEPRESSIVE DISORDER, RECURRENT, MODERATE: ICD-10-CM

## 2021-05-05 ENCOUNTER — OUTPATIENT (OUTPATIENT)
Dept: OUTPATIENT SERVICES | Facility: HOSPITAL | Age: 61
LOS: 1 days | Discharge: HOME | End: 2021-05-05

## 2021-05-05 ENCOUNTER — APPOINTMENT (OUTPATIENT)
Dept: PSYCHIATRY | Facility: CLINIC | Age: 61
End: 2021-05-05

## 2021-05-05 DIAGNOSIS — F33.1 MAJOR DEPRESSIVE DISORDER, RECURRENT, MODERATE: ICD-10-CM

## 2021-05-05 DIAGNOSIS — K80.70 CALCULUS OF GALLBLADDER AND BILE DUCT WITHOUT CHOLECYSTITIS WITHOUT OBSTRUCTION: Chronic | ICD-10-CM

## 2021-05-10 ENCOUNTER — APPOINTMENT (OUTPATIENT)
Dept: PSYCHIATRY | Facility: CLINIC | Age: 61
End: 2021-05-10
Payer: MEDICAID

## 2021-05-10 ENCOUNTER — OUTPATIENT (OUTPATIENT)
Dept: OUTPATIENT SERVICES | Facility: HOSPITAL | Age: 61
LOS: 1 days | Discharge: HOME | End: 2021-05-10

## 2021-05-10 DIAGNOSIS — F41.1 GENERALIZED ANXIETY DISORDER: ICD-10-CM

## 2021-05-10 DIAGNOSIS — F32.9 MAJOR DEPRESSIVE DISORDER, SINGLE EPISODE, UNSPECIFIED: ICD-10-CM

## 2021-05-10 DIAGNOSIS — K80.70 CALCULUS OF GALLBLADDER AND BILE DUCT WITHOUT CHOLECYSTITIS WITHOUT OBSTRUCTION: Chronic | ICD-10-CM

## 2021-05-10 PROCEDURE — 99213 OFFICE O/P EST LOW 20 MIN: CPT | Mod: 95

## 2021-05-10 RX ORDER — PREGABALIN 75 MG/1
75 CAPSULE ORAL
Refills: 0 | Status: DISCONTINUED | COMMUNITY
End: 2021-05-10

## 2021-05-10 RX ORDER — ERYTHROMYCIN 5 MG/G
5 OINTMENT OPHTHALMIC
Qty: 3 | Refills: 0 | Status: DISCONTINUED | COMMUNITY
Start: 2021-04-06

## 2021-05-10 RX ORDER — OXYCODONE AND ACETAMINOPHEN 10; 325 MG/1; MG/1
10-325 TABLET ORAL
Qty: 60 | Refills: 0 | Status: DISCONTINUED | COMMUNITY
Start: 2020-11-11

## 2021-05-10 RX ORDER — AMOXICILLIN AND CLAVULANATE POTASSIUM 875; 125 MG/1; MG/1
875-125 TABLET, COATED ORAL
Qty: 20 | Refills: 0 | Status: DISCONTINUED | COMMUNITY
Start: 2021-03-15

## 2021-05-10 RX ORDER — VARENICLINE TARTRATE 1 MG/1
1 TABLET, FILM COATED ORAL
Qty: 56 | Refills: 0 | Status: DISCONTINUED | COMMUNITY
Start: 2020-06-10

## 2021-05-10 RX ORDER — ALBUTEROL SULFATE 90 UG/1
108 (90 BASE) INHALANT RESPIRATORY (INHALATION)
Qty: 8 | Refills: 0 | Status: ACTIVE | COMMUNITY
Start: 2020-06-30

## 2021-05-10 RX ORDER — FLUTICASONE PROPIONATE 50 UG/1
50 SPRAY, METERED NASAL
Qty: 16 | Refills: 0 | Status: DISCONTINUED | COMMUNITY
Start: 2020-06-30

## 2021-05-10 RX ORDER — MORPHINE SULFATE 30 MG/1
30 TABLET, FILM COATED, EXTENDED RELEASE ORAL
Qty: 90 | Refills: 0 | Status: DISCONTINUED | COMMUNITY
Start: 2021-04-22

## 2021-05-10 RX ORDER — MORPHINE SULFATE 15 MG/1
15 TABLET, FILM COATED, EXTENDED RELEASE ORAL
Qty: 90 | Refills: 0 | Status: DISCONTINUED | COMMUNITY
Start: 2020-11-11

## 2021-05-10 RX ORDER — METHYLPREDNISOLONE 4 MG/1
4 TABLET ORAL
Qty: 21 | Refills: 0 | Status: DISCONTINUED | COMMUNITY
Start: 2020-11-11

## 2021-05-19 ENCOUNTER — APPOINTMENT (OUTPATIENT)
Dept: PSYCHIATRY | Facility: CLINIC | Age: 61
End: 2021-05-19

## 2021-05-19 ENCOUNTER — OUTPATIENT (OUTPATIENT)
Dept: OUTPATIENT SERVICES | Facility: HOSPITAL | Age: 61
LOS: 1 days | Discharge: HOME | End: 2021-05-19

## 2021-05-19 DIAGNOSIS — F32.9 MAJOR DEPRESSIVE DISORDER, SINGLE EPISODE, UNSPECIFIED: ICD-10-CM

## 2021-05-19 DIAGNOSIS — K80.70 CALCULUS OF GALLBLADDER AND BILE DUCT WITHOUT CHOLECYSTITIS WITHOUT OBSTRUCTION: Chronic | ICD-10-CM

## 2021-05-19 DIAGNOSIS — F41.1 GENERALIZED ANXIETY DISORDER: ICD-10-CM

## 2021-06-07 ENCOUNTER — OUTPATIENT (OUTPATIENT)
Dept: OUTPATIENT SERVICES | Facility: HOSPITAL | Age: 61
LOS: 1 days | Discharge: HOME | End: 2021-06-07

## 2021-06-07 ENCOUNTER — APPOINTMENT (OUTPATIENT)
Dept: PSYCHIATRY | Facility: CLINIC | Age: 61
End: 2021-06-07
Payer: MEDICAID

## 2021-06-07 DIAGNOSIS — K80.70 CALCULUS OF GALLBLADDER AND BILE DUCT WITHOUT CHOLECYSTITIS WITHOUT OBSTRUCTION: Chronic | ICD-10-CM

## 2021-06-07 DIAGNOSIS — F41.1 GENERALIZED ANXIETY DISORDER: ICD-10-CM

## 2021-06-07 DIAGNOSIS — F32.9 MAJOR DEPRESSIVE DISORDER, SINGLE EPISODE, UNSPECIFIED: ICD-10-CM

## 2021-06-07 PROCEDURE — 99213 OFFICE O/P EST LOW 20 MIN: CPT | Mod: 95

## 2021-07-02 ENCOUNTER — OUTPATIENT (OUTPATIENT)
Dept: OUTPATIENT SERVICES | Facility: HOSPITAL | Age: 61
LOS: 1 days | Discharge: HOME | End: 2021-07-02
Payer: MEDICAID

## 2021-07-02 ENCOUNTER — RESULT REVIEW (OUTPATIENT)
Age: 61
End: 2021-07-02

## 2021-07-02 DIAGNOSIS — K80.70 CALCULUS OF GALLBLADDER AND BILE DUCT WITHOUT CHOLECYSTITIS WITHOUT OBSTRUCTION: Chronic | ICD-10-CM

## 2021-07-02 DIAGNOSIS — M25.511 PAIN IN RIGHT SHOULDER: ICD-10-CM

## 2021-07-02 PROCEDURE — 73200 CT UPPER EXTREMITY W/O DYE: CPT | Mod: 26,RT

## 2021-07-07 ENCOUNTER — APPOINTMENT (OUTPATIENT)
Dept: PSYCHIATRY | Facility: CLINIC | Age: 61
End: 2021-07-07
Payer: MEDICAID

## 2021-07-07 ENCOUNTER — OUTPATIENT (OUTPATIENT)
Dept: OUTPATIENT SERVICES | Facility: HOSPITAL | Age: 61
LOS: 1 days | Discharge: HOME | End: 2021-07-07

## 2021-07-07 DIAGNOSIS — F32.9 MAJOR DEPRESSIVE DISORDER, SINGLE EPISODE, UNSPECIFIED: ICD-10-CM

## 2021-07-07 DIAGNOSIS — K80.70 CALCULUS OF GALLBLADDER AND BILE DUCT WITHOUT CHOLECYSTITIS WITHOUT OBSTRUCTION: Chronic | ICD-10-CM

## 2021-07-07 DIAGNOSIS — F41.1 GENERALIZED ANXIETY DISORDER: ICD-10-CM

## 2021-07-07 PROCEDURE — 99213 OFFICE O/P EST LOW 20 MIN: CPT | Mod: 95

## 2021-07-14 ENCOUNTER — APPOINTMENT (OUTPATIENT)
Dept: PSYCHIATRY | Facility: CLINIC | Age: 61
End: 2021-07-14

## 2021-07-14 ENCOUNTER — OUTPATIENT (OUTPATIENT)
Dept: OUTPATIENT SERVICES | Facility: HOSPITAL | Age: 61
LOS: 1 days | Discharge: HOME | End: 2021-07-14

## 2021-07-14 DIAGNOSIS — F41.1 GENERALIZED ANXIETY DISORDER: ICD-10-CM

## 2021-07-14 DIAGNOSIS — K80.70 CALCULUS OF GALLBLADDER AND BILE DUCT WITHOUT CHOLECYSTITIS WITHOUT OBSTRUCTION: Chronic | ICD-10-CM

## 2021-07-14 DIAGNOSIS — F32.9 MAJOR DEPRESSIVE DISORDER, SINGLE EPISODE, UNSPECIFIED: ICD-10-CM

## 2021-08-04 ENCOUNTER — OUTPATIENT (OUTPATIENT)
Dept: OUTPATIENT SERVICES | Facility: HOSPITAL | Age: 61
LOS: 1 days | Discharge: HOME | End: 2021-08-04

## 2021-08-04 ENCOUNTER — APPOINTMENT (OUTPATIENT)
Dept: PSYCHIATRY | Facility: CLINIC | Age: 61
End: 2021-08-04
Payer: MEDICAID

## 2021-08-04 DIAGNOSIS — F32.9 MAJOR DEPRESSIVE DISORDER, SINGLE EPISODE, UNSPECIFIED: ICD-10-CM

## 2021-08-04 DIAGNOSIS — K80.70 CALCULUS OF GALLBLADDER AND BILE DUCT WITHOUT CHOLECYSTITIS WITHOUT OBSTRUCTION: Chronic | ICD-10-CM

## 2021-08-04 DIAGNOSIS — F41.1 GENERALIZED ANXIETY DISORDER: ICD-10-CM

## 2021-08-04 PROCEDURE — 99214 OFFICE O/P EST MOD 30 MIN: CPT | Mod: 95

## 2021-09-01 ENCOUNTER — OUTPATIENT (OUTPATIENT)
Dept: OUTPATIENT SERVICES | Facility: HOSPITAL | Age: 61
LOS: 1 days | Discharge: HOME | End: 2021-09-01

## 2021-09-01 ENCOUNTER — APPOINTMENT (OUTPATIENT)
Dept: PSYCHIATRY | Facility: CLINIC | Age: 61
End: 2021-09-01
Payer: MEDICAID

## 2021-09-01 DIAGNOSIS — F41.1 GENERALIZED ANXIETY DISORDER: ICD-10-CM

## 2021-09-01 DIAGNOSIS — K80.70 CALCULUS OF GALLBLADDER AND BILE DUCT WITHOUT CHOLECYSTITIS WITHOUT OBSTRUCTION: Chronic | ICD-10-CM

## 2021-09-01 DIAGNOSIS — F32.9 MAJOR DEPRESSIVE DISORDER, SINGLE EPISODE, UNSPECIFIED: ICD-10-CM

## 2021-09-01 PROCEDURE — 99214 OFFICE O/P EST MOD 30 MIN: CPT | Mod: 95

## 2021-09-15 ENCOUNTER — OUTPATIENT (OUTPATIENT)
Dept: OUTPATIENT SERVICES | Facility: HOSPITAL | Age: 61
LOS: 1 days | Discharge: HOME | End: 2021-09-15

## 2021-09-15 ENCOUNTER — APPOINTMENT (OUTPATIENT)
Dept: PSYCHIATRY | Facility: CLINIC | Age: 61
End: 2021-09-15

## 2021-09-15 DIAGNOSIS — K80.70 CALCULUS OF GALLBLADDER AND BILE DUCT WITHOUT CHOLECYSTITIS WITHOUT OBSTRUCTION: Chronic | ICD-10-CM

## 2021-09-15 DIAGNOSIS — F41.1 GENERALIZED ANXIETY DISORDER: ICD-10-CM

## 2021-09-15 DIAGNOSIS — F32.9 MAJOR DEPRESSIVE DISORDER, SINGLE EPISODE, UNSPECIFIED: ICD-10-CM

## 2021-09-24 ENCOUNTER — OUTPATIENT (OUTPATIENT)
Dept: OUTPATIENT SERVICES | Facility: HOSPITAL | Age: 61
LOS: 1 days | Discharge: HOME | End: 2021-09-24
Payer: MEDICAID

## 2021-09-24 ENCOUNTER — RESULT REVIEW (OUTPATIENT)
Age: 61
End: 2021-09-24

## 2021-09-24 VITALS
WEIGHT: 165.57 LBS | OXYGEN SATURATION: 98 % | HEART RATE: 81 BPM | SYSTOLIC BLOOD PRESSURE: 137 MMHG | DIASTOLIC BLOOD PRESSURE: 67 MMHG | TEMPERATURE: 98 F | RESPIRATION RATE: 14 BRPM | HEIGHT: 66 IN

## 2021-09-24 DIAGNOSIS — M19.011 PRIMARY OSTEOARTHRITIS, RIGHT SHOULDER: ICD-10-CM

## 2021-09-24 DIAGNOSIS — Z01.818 ENCOUNTER FOR OTHER PREPROCEDURAL EXAMINATION: ICD-10-CM

## 2021-09-24 DIAGNOSIS — Z98.890 OTHER SPECIFIED POSTPROCEDURAL STATES: Chronic | ICD-10-CM

## 2021-09-24 DIAGNOSIS — K80.70 CALCULUS OF GALLBLADDER AND BILE DUCT WITHOUT CHOLECYSTITIS WITHOUT OBSTRUCTION: Chronic | ICD-10-CM

## 2021-09-24 LAB
ALBUMIN SERPL ELPH-MCNC: 4.6 G/DL — SIGNIFICANT CHANGE UP (ref 3.5–5.2)
ALP SERPL-CCNC: 82 U/L — SIGNIFICANT CHANGE UP (ref 30–115)
ALT FLD-CCNC: 25 U/L — SIGNIFICANT CHANGE UP (ref 0–41)
ANION GAP SERPL CALC-SCNC: 14 MMOL/L — SIGNIFICANT CHANGE UP (ref 7–14)
APTT BLD: 35.3 SEC — SIGNIFICANT CHANGE UP (ref 27–39.2)
AST SERPL-CCNC: 25 U/L — SIGNIFICANT CHANGE UP (ref 0–41)
BASOPHILS # BLD AUTO: 0.05 K/UL — SIGNIFICANT CHANGE UP (ref 0–0.2)
BASOPHILS NFR BLD AUTO: 0.6 % — SIGNIFICANT CHANGE UP (ref 0–1)
BILIRUB SERPL-MCNC: <0.2 MG/DL — SIGNIFICANT CHANGE UP (ref 0.2–1.2)
BLD GP AB SCN SERPL QL: SIGNIFICANT CHANGE UP
BUN SERPL-MCNC: 19 MG/DL — SIGNIFICANT CHANGE UP (ref 10–20)
CALCIUM SERPL-MCNC: 10.5 MG/DL — HIGH (ref 8.5–10.1)
CHLORIDE SERPL-SCNC: 99 MMOL/L — SIGNIFICANT CHANGE UP (ref 98–110)
CO2 SERPL-SCNC: 27 MMOL/L — SIGNIFICANT CHANGE UP (ref 17–32)
CREAT SERPL-MCNC: 0.7 MG/DL — SIGNIFICANT CHANGE UP (ref 0.7–1.5)
EOSINOPHIL # BLD AUTO: 0.39 K/UL — SIGNIFICANT CHANGE UP (ref 0–0.7)
EOSINOPHIL NFR BLD AUTO: 4.7 % — SIGNIFICANT CHANGE UP (ref 0–8)
GLUCOSE SERPL-MCNC: 94 MG/DL — SIGNIFICANT CHANGE UP (ref 70–99)
HCT VFR BLD CALC: 43.3 % — SIGNIFICANT CHANGE UP (ref 37–47)
HGB BLD-MCNC: 13.5 G/DL — SIGNIFICANT CHANGE UP (ref 12–16)
IMM GRANULOCYTES NFR BLD AUTO: 0.4 % — HIGH (ref 0.1–0.3)
INR BLD: 0.95 RATIO — SIGNIFICANT CHANGE UP (ref 0.65–1.3)
LYMPHOCYTES # BLD AUTO: 1.76 K/UL — SIGNIFICANT CHANGE UP (ref 1.2–3.4)
LYMPHOCYTES # BLD AUTO: 21.4 % — SIGNIFICANT CHANGE UP (ref 20.5–51.1)
MCHC RBC-ENTMCNC: 28.5 PG — SIGNIFICANT CHANGE UP (ref 27–31)
MCHC RBC-ENTMCNC: 31.2 G/DL — LOW (ref 32–37)
MCV RBC AUTO: 91.5 FL — SIGNIFICANT CHANGE UP (ref 81–99)
MONOCYTES # BLD AUTO: 0.66 K/UL — HIGH (ref 0.1–0.6)
MONOCYTES NFR BLD AUTO: 8 % — SIGNIFICANT CHANGE UP (ref 1.7–9.3)
MRSA PCR RESULT.: NEGATIVE — SIGNIFICANT CHANGE UP
NEUTROPHILS # BLD AUTO: 5.35 K/UL — SIGNIFICANT CHANGE UP (ref 1.4–6.5)
NEUTROPHILS NFR BLD AUTO: 64.9 % — SIGNIFICANT CHANGE UP (ref 42.2–75.2)
NRBC # BLD: 0 /100 WBCS — SIGNIFICANT CHANGE UP (ref 0–0)
PLATELET # BLD AUTO: 402 K/UL — HIGH (ref 130–400)
POTASSIUM SERPL-MCNC: 5.6 MMOL/L — HIGH (ref 3.5–5)
POTASSIUM SERPL-SCNC: 5.6 MMOL/L — HIGH (ref 3.5–5)
PROT SERPL-MCNC: 7.7 G/DL — SIGNIFICANT CHANGE UP (ref 6–8)
PROTHROM AB SERPL-ACNC: 10.9 SEC — SIGNIFICANT CHANGE UP (ref 9.95–12.87)
RBC # BLD: 4.73 M/UL — SIGNIFICANT CHANGE UP (ref 4.2–5.4)
RBC # FLD: 14.4 % — SIGNIFICANT CHANGE UP (ref 11.5–14.5)
SODIUM SERPL-SCNC: 140 MMOL/L — SIGNIFICANT CHANGE UP (ref 135–146)
WBC # BLD: 8.24 K/UL — SIGNIFICANT CHANGE UP (ref 4.8–10.8)
WBC # FLD AUTO: 8.24 K/UL — SIGNIFICANT CHANGE UP (ref 4.8–10.8)

## 2021-09-24 PROCEDURE — 93010 ELECTROCARDIOGRAM REPORT: CPT

## 2021-09-24 PROCEDURE — 71046 X-RAY EXAM CHEST 2 VIEWS: CPT | Mod: 26

## 2021-09-24 NOTE — H&P PST ADULT - REASON FOR ADMISSION
Patient is a   61 year old female presenting to [facility] in preparation for right total shoulder replacement  on    10/15/21  under gen  anesthesia by Dr. Gann.  Pt reports--I HAVE BEEN HAVING HORRIBLE PAIN IN MY RIGHT SHOULDER FOR ABOUT 18 MONTHS.  THE PAIN IS A CONSTANT 10.  THE PAIN RADIATES INTO MY NECK AND BACK. ITS A BURNING, STABBING AND TINGLING PAIN.  I GET VERY LITTLE  RELIEF.

## 2021-09-24 NOTE — H&P PST ADULT - NSANTHOSAYNRD_GEN_A_CORE
No. CALLY screening performed.  STOP BANG Legend: 0-2 = LOW Risk; 3-4 = INTERMEDIATE Risk; 5-8 = HIGH Risk

## 2021-09-24 NOTE — H&P PST ADULT - NSICDXPASTSURGICALHX_GEN_ALL_CORE_FT
PAST SURGICAL HISTORY:  Calculus of gallbladder and bile duct      PAST SURGICAL HISTORY:  Calculus of gallbladder and bile duct     History of neck surgery

## 2021-09-24 NOTE — H&P PST ADULT - NSICDXPASTMEDICALHX_GEN_ALL_CORE_FT
PAST MEDICAL HISTORY:  Anxiety     Bile duct stone with obstruct     Degenerative disc disease, lumbar     GERD (gastroesophageal reflux disease)     Hiatal hernia      PAST MEDICAL HISTORY:  Anxiety     Bile duct stone with obstruct     Degenerative disc disease, lumbar     GERD (gastroesophageal reflux disease)     Hiatal hernia     Neckache

## 2021-09-24 NOTE — H&P PST ADULT - CONSTITUTIONAL COMMENTS
PT C/O DISCOMFORT WHEN SHE ROTATES HER NECK TO THE RIGHT -PT STATES- IT IS R/T HER SHOULDER PAIN. PT REPORTS- I HAD CERVICAL XRAYS DONE WITH NEURO- THEY ARE NEGATIVE. PT WILL FAX COPY OF XRAY TO [facility].

## 2021-09-24 NOTE — H&P PST ADULT - HISTORY OF PRESENT ILLNESS
PT PRESENTS TO [facility] WITH NO SOB, CP, PALPITATIONS, DYSURIA, UTI OR URI AT PRESENT.   PT ABLE TO WALK UP 2-3 FLIGHTS OF STEPS WITH NO SOB.  AS PER THE PT, THIS IS HIS/HER COMPLETE MEDICAL AND SURGICAL HX, INCLUDING MEDICATIONS PRESCRIBED AND OVER THE COUNTER  pt denies any covid s/s, or tested positive in the past  - PT HAS DATE AND TIME FOR COVID TEST PRIOR TO PROCEDURE.    pt advised self quarantine till day of procedure  denies travel outside the [place] in the past 30 days  Anesthesia Alert  NO--Difficult Airway  YES--History of neck surgery or radiation  YES --Limited ROM of neck- PT C/O NECK DISCOMFORT WHEN SHE MOVES HER NECK TO THE RIGHT  NO--History of Malignant hyperthermia  NO--Personal or family history of Pseudocholinesterase deficiency  NO--Prior Anesthesia Complication  NO--Latex Allergy  NO--Loose teeth  NO--History of Rheumatoid Arthritis  NO--CALLY  NO BLEEDING RISK  NO--Other_____

## 2021-09-25 LAB
A1C WITH ESTIMATED AVERAGE GLUCOSE RESULT: 5.7 % — HIGH (ref 4–5.6)
ESTIMATED AVERAGE GLUCOSE: 117 MG/DL — HIGH (ref 68–114)

## 2021-09-29 ENCOUNTER — APPOINTMENT (OUTPATIENT)
Dept: PSYCHIATRY | Facility: CLINIC | Age: 61
End: 2021-09-29
Payer: MEDICAID

## 2021-09-29 ENCOUNTER — OUTPATIENT (OUTPATIENT)
Dept: OUTPATIENT SERVICES | Facility: HOSPITAL | Age: 61
LOS: 1 days | Discharge: HOME | End: 2021-09-29

## 2021-09-29 DIAGNOSIS — F41.1 GENERALIZED ANXIETY DISORDER: ICD-10-CM

## 2021-09-29 DIAGNOSIS — F32.9 MAJOR DEPRESSIVE DISORDER, SINGLE EPISODE, UNSPECIFIED: ICD-10-CM

## 2021-09-29 DIAGNOSIS — Z98.890 OTHER SPECIFIED POSTPROCEDURAL STATES: Chronic | ICD-10-CM

## 2021-09-29 DIAGNOSIS — K80.70 CALCULUS OF GALLBLADDER AND BILE DUCT WITHOUT CHOLECYSTITIS WITHOUT OBSTRUCTION: Chronic | ICD-10-CM

## 2021-09-29 PROBLEM — M54.2 CERVICALGIA: Chronic | Status: ACTIVE | Noted: 2021-09-24

## 2021-09-29 PROCEDURE — 99214 OFFICE O/P EST MOD 30 MIN: CPT | Mod: 95

## 2021-09-29 RX ORDER — ESCITALOPRAM OXALATE 5 MG/1
5 TABLET ORAL DAILY
Qty: 30 | Refills: 1 | Status: DISCONTINUED | COMMUNITY
Start: 2020-12-18 | End: 2021-09-29

## 2021-10-01 ENCOUNTER — OUTPATIENT (OUTPATIENT)
Dept: OUTPATIENT SERVICES | Facility: HOSPITAL | Age: 61
LOS: 1 days | Discharge: HOME | End: 2021-10-01

## 2021-10-01 DIAGNOSIS — Z02.9 ENCOUNTER FOR ADMINISTRATIVE EXAMINATIONS, UNSPECIFIED: ICD-10-CM

## 2021-10-01 DIAGNOSIS — K80.70 CALCULUS OF GALLBLADDER AND BILE DUCT WITHOUT CHOLECYSTITIS WITHOUT OBSTRUCTION: Chronic | ICD-10-CM

## 2021-10-01 DIAGNOSIS — Z98.890 OTHER SPECIFIED POSTPROCEDURAL STATES: Chronic | ICD-10-CM

## 2021-10-01 LAB
POTASSIUM SERPL-MCNC: 4.7 MMOL/L — SIGNIFICANT CHANGE UP (ref 3.5–5)
POTASSIUM SERPL-SCNC: 4.7 MMOL/L — SIGNIFICANT CHANGE UP (ref 3.5–5)

## 2021-10-11 ENCOUNTER — APPOINTMENT (OUTPATIENT)
Dept: PSYCHIATRY | Facility: CLINIC | Age: 61
End: 2021-10-11
Payer: MEDICAID

## 2021-10-11 ENCOUNTER — OUTPATIENT (OUTPATIENT)
Dept: OUTPATIENT SERVICES | Facility: HOSPITAL | Age: 61
LOS: 1 days | Discharge: HOME | End: 2021-10-11

## 2021-10-11 DIAGNOSIS — F32.9 MAJOR DEPRESSIVE DISORDER, SINGLE EPISODE, UNSPECIFIED: ICD-10-CM

## 2021-10-11 DIAGNOSIS — Z98.890 OTHER SPECIFIED POSTPROCEDURAL STATES: Chronic | ICD-10-CM

## 2021-10-11 DIAGNOSIS — F41.1 GENERALIZED ANXIETY DISORDER: ICD-10-CM

## 2021-10-11 DIAGNOSIS — K80.70 CALCULUS OF GALLBLADDER AND BILE DUCT WITHOUT CHOLECYSTITIS WITHOUT OBSTRUCTION: Chronic | ICD-10-CM

## 2021-10-11 PROCEDURE — 99213 OFFICE O/P EST LOW 20 MIN: CPT | Mod: 95

## 2021-10-11 RX ORDER — ESCITALOPRAM OXALATE 10 MG/1
10 TABLET ORAL
Qty: 30 | Refills: 0 | Status: COMPLETED | COMMUNITY
Start: 2021-09-01

## 2021-10-11 RX ORDER — LORATADINE 10 MG/1
10 TABLET ORAL
Qty: 30 | Refills: 0 | Status: COMPLETED | COMMUNITY
Start: 2021-09-29

## 2021-10-12 ENCOUNTER — OUTPATIENT (OUTPATIENT)
Dept: OUTPATIENT SERVICES | Facility: HOSPITAL | Age: 61
LOS: 1 days | Discharge: HOME | End: 2021-10-12

## 2021-10-12 DIAGNOSIS — Z11.59 ENCOUNTER FOR SCREENING FOR OTHER VIRAL DISEASES: ICD-10-CM

## 2021-10-12 DIAGNOSIS — Z98.890 OTHER SPECIFIED POSTPROCEDURAL STATES: Chronic | ICD-10-CM

## 2021-10-12 DIAGNOSIS — K80.70 CALCULUS OF GALLBLADDER AND BILE DUCT WITHOUT CHOLECYSTITIS WITHOUT OBSTRUCTION: Chronic | ICD-10-CM

## 2021-10-15 ENCOUNTER — RESULT REVIEW (OUTPATIENT)
Age: 61
End: 2021-10-15

## 2021-10-15 ENCOUNTER — OUTPATIENT (OUTPATIENT)
Dept: OUTPATIENT SERVICES | Facility: HOSPITAL | Age: 61
LOS: 1 days | Discharge: HOME | End: 2021-10-15
Payer: MEDICAID

## 2021-10-15 VITALS
DIASTOLIC BLOOD PRESSURE: 58 MMHG | HEART RATE: 77 BPM | TEMPERATURE: 98 F | OXYGEN SATURATION: 100 % | RESPIRATION RATE: 17 BRPM | SYSTOLIC BLOOD PRESSURE: 105 MMHG

## 2021-10-15 VITALS
RESPIRATION RATE: 14 BRPM | DIASTOLIC BLOOD PRESSURE: 54 MMHG | HEIGHT: 66.5 IN | HEART RATE: 77 BPM | WEIGHT: 139.99 LBS | TEMPERATURE: 98 F | OXYGEN SATURATION: 99 % | SYSTOLIC BLOOD PRESSURE: 95 MMHG

## 2021-10-15 DIAGNOSIS — Z98.890 OTHER SPECIFIED POSTPROCEDURAL STATES: Chronic | ICD-10-CM

## 2021-10-15 DIAGNOSIS — M19.011 PRIMARY OSTEOARTHRITIS, RIGHT SHOULDER: ICD-10-CM

## 2021-10-15 DIAGNOSIS — K80.70 CALCULUS OF GALLBLADDER AND BILE DUCT WITHOUT CHOLECYSTITIS WITHOUT OBSTRUCTION: Chronic | ICD-10-CM

## 2021-10-15 DIAGNOSIS — K21.9 GASTRO-ESOPHAGEAL REFLUX DISEASE WITHOUT ESOPHAGITIS: ICD-10-CM

## 2021-10-15 DIAGNOSIS — F17.210 NICOTINE DEPENDENCE, CIGARETTES, UNCOMPLICATED: ICD-10-CM

## 2021-10-15 DIAGNOSIS — F41.9 ANXIETY DISORDER, UNSPECIFIED: ICD-10-CM

## 2021-10-15 PROCEDURE — 88305 TISSUE EXAM BY PATHOLOGIST: CPT | Mod: 26

## 2021-10-15 PROCEDURE — 88311 DECALCIFY TISSUE: CPT | Mod: 26

## 2021-10-15 PROCEDURE — 73030 X-RAY EXAM OF SHOULDER: CPT | Mod: 26,RT

## 2021-10-15 RX ORDER — SODIUM CHLORIDE 9 MG/ML
1000 INJECTION, SOLUTION INTRAVENOUS
Refills: 0 | Status: DISCONTINUED | OUTPATIENT
Start: 2021-10-15 | End: 2021-10-15

## 2021-10-15 RX ORDER — ONDANSETRON 8 MG/1
4 TABLET, FILM COATED ORAL ONCE
Refills: 0 | Status: DISCONTINUED | OUTPATIENT
Start: 2021-10-15 | End: 2021-10-15

## 2021-10-15 RX ORDER — HYDROMORPHONE HYDROCHLORIDE 2 MG/ML
0.5 INJECTION INTRAMUSCULAR; INTRAVENOUS; SUBCUTANEOUS
Refills: 0 | Status: DISCONTINUED | OUTPATIENT
Start: 2021-10-15 | End: 2021-10-15

## 2021-10-15 RX ORDER — ASPIRIN/CALCIUM CARB/MAGNESIUM 324 MG
1 TABLET ORAL
Qty: 14 | Refills: 0
Start: 2021-10-15 | End: 2021-10-28

## 2021-10-15 RX ORDER — HYDROMORPHONE HYDROCHLORIDE 2 MG/ML
1 INJECTION INTRAMUSCULAR; INTRAVENOUS; SUBCUTANEOUS
Refills: 0 | Status: DISCONTINUED | OUTPATIENT
Start: 2021-10-15 | End: 2021-10-15

## 2021-10-15 RX ORDER — ZOLPIDEM TARTRATE 10 MG/1
0 TABLET ORAL
Qty: 0 | Refills: 0 | DISCHARGE

## 2021-10-15 RX ORDER — ESCITALOPRAM OXALATE 10 MG/1
0 TABLET, FILM COATED ORAL
Qty: 0 | Refills: 0 | DISCHARGE

## 2021-10-15 RX ADMIN — SODIUM CHLORIDE 100 MILLILITER(S): 9 INJECTION, SOLUTION INTRAVENOUS at 11:14

## 2021-10-15 RX ADMIN — HYDROMORPHONE HYDROCHLORIDE 0.5 MILLIGRAM(S): 2 INJECTION INTRAMUSCULAR; INTRAVENOUS; SUBCUTANEOUS at 11:45

## 2021-10-15 NOTE — ASU PATIENT PROFILE, ADULT - NSICDXPASTMEDICALHX_GEN_ALL_CORE_FT
PAST MEDICAL HISTORY:  Anxiety     Bile duct stone with obstruct     Degenerative disc disease, lumbar     GERD (gastroesophageal reflux disease)     Hiatal hernia     Neckache

## 2021-10-15 NOTE — CHART NOTE - NSCHARTNOTEFT_GEN_A_CORE
PACU ANESTHESIA ADMISSION NOTE      Procedure: Total shoulder replacement      Post op diagnosis:  Arthritis of right shoulder region        ____  Intubated  TV:______       Rate: ______      FiO2: ______    __x__  Patent Airway    __x__  Full return of protective reflexes    __x__  Full recovery from anesthesia / back to baseline status    Vitals:  T(C): 36.7 (10-15-21 @ 06:49), Max: 36.7 (10-15-21 @ 06:34)  HR: 77 (10-15-21 @ 06:49) (77 - 77)  BP: 95/54 (10-15-21 @ 06:49) (95/54 - 95/54)  RR: 14 (10-15-21 @ 06:49) (14 - 14)  SpO2: 99% (10-15-21 @ 06:49) (99% - 99%)    Mental Status:  __x__ Awake   ___x__ Alert   _____ Drowsy   _____ Sedated    Nausea/Vomiting:  __x__ NO  ______Yes,   See Post - Op Orders          Pain Scale (0-10):  ___0__    Treatment: ____ None    __x__ See Post - Op/PCA Orders    Post - Operative Fluids:   ____ Oral   __x__ See Post - Op Orders    Plan: Discharge:   ____Home       _____Floor     _____Critical Care    _____  Other:_________________    Comments: Patient had smooth intraoperative event, no anesthesia complication.  PACU Vital signs: HR:    84        BP:  103      / 62         RR:   18          O2 Sat:  98     %     Temp 98

## 2021-10-15 NOTE — ASU DISCHARGE PLAN (ADULT/PEDIATRIC) - ASU DC SPECIAL INSTRUCTIONSFT
Post Operative Instructions for Shoulder Surgery    Your Surgery Included  [ ] Rotator Cuff Repair			  [ ] Debridement				  [ ] Biceps Tenodesis/Tenotomy	  [ ] Distal Clavicle Resection		  [ ] SLAP Repair  [ ] Instability Repair  [ ] Lysis of Adhesions/Manipulation  [x ] Other: total shoudler replacement  	  Call our office ([phone]) immediately if you experience any of the following:  •	Excessive bleeding or pus like drainage at the incision site  •	Uncontrollable pain not relieved by pain medication  •	Excessive swelling or redness at the incision site  •	Fever above 101.5 degrees not controlled with Tylenol or Motrin  •	Shortness of Breath  •	Any foul odor or blistering from the surgery site    Pain Management: You already have pain medications at home, any issues with pain please reach out to your pain management provider.    Regional Anesthesia Injections (Blocks): You may have been given a regional nerve block either before or after surgery. This may numb your shoulder for 24-36 hours    Diet: Eat a bland diet for the first day after surgery. Progress your diet as tolerated. Constipation may occur with Narcotic usage, contact our office if you are experiencing constipation.    Activity: After you arrive at home, spend most of the first 24 hours resting in bed, on the couch, or in a reclining chair. After the first 24 hours at home, slowly increase your activity level based on your symptoms.    Dressing Change: Keep bandage clean and dry until follow up.    Showering: You may shower on the 5th day after surgery if the wound is dry and clean, but do not let the wound soak in water until sutures are removed. Do not submerge in any water until after your postoperative appointment in clinic.    Shoulder Sling: You may have been sent home with a sling / pillow attachment holding your arm away from your body. You may remove the sling when changing clothes or bathing. Make sure to wear the sling while sleeping unless instructed otherwise. You may remove for exercises.    Shoulder Exercises: Sling at all times until follow up    Follow Up: As Scheduled

## 2021-10-18 LAB — SURGICAL PATHOLOGY STUDY: SIGNIFICANT CHANGE UP

## 2021-10-27 ENCOUNTER — APPOINTMENT (OUTPATIENT)
Dept: PSYCHIATRY | Facility: CLINIC | Age: 61
End: 2021-10-27
Payer: MEDICAID

## 2021-10-27 ENCOUNTER — OUTPATIENT (OUTPATIENT)
Dept: OUTPATIENT SERVICES | Facility: HOSPITAL | Age: 61
LOS: 1 days | Discharge: HOME | End: 2021-10-27

## 2021-10-27 DIAGNOSIS — K80.70 CALCULUS OF GALLBLADDER AND BILE DUCT WITHOUT CHOLECYSTITIS WITHOUT OBSTRUCTION: Chronic | ICD-10-CM

## 2021-10-27 DIAGNOSIS — Z98.890 OTHER SPECIFIED POSTPROCEDURAL STATES: Chronic | ICD-10-CM

## 2021-10-27 DIAGNOSIS — F32.9 MAJOR DEPRESSIVE DISORDER, SINGLE EPISODE, UNSPECIFIED: ICD-10-CM

## 2021-10-27 DIAGNOSIS — F41.1 GENERALIZED ANXIETY DISORDER: ICD-10-CM

## 2021-10-27 PROCEDURE — 99214 OFFICE O/P EST MOD 30 MIN: CPT | Mod: 95

## 2021-11-24 ENCOUNTER — OUTPATIENT (OUTPATIENT)
Dept: OUTPATIENT SERVICES | Facility: HOSPITAL | Age: 61
LOS: 1 days | Discharge: HOME | End: 2021-11-24

## 2021-11-24 ENCOUNTER — APPOINTMENT (OUTPATIENT)
Dept: PSYCHIATRY | Facility: CLINIC | Age: 61
End: 2021-11-24
Payer: MEDICAID

## 2021-11-24 DIAGNOSIS — F41.1 GENERALIZED ANXIETY DISORDER: ICD-10-CM

## 2021-11-24 DIAGNOSIS — Z98.890 OTHER SPECIFIED POSTPROCEDURAL STATES: Chronic | ICD-10-CM

## 2021-11-24 DIAGNOSIS — K80.70 CALCULUS OF GALLBLADDER AND BILE DUCT WITHOUT CHOLECYSTITIS WITHOUT OBSTRUCTION: Chronic | ICD-10-CM

## 2021-11-24 DIAGNOSIS — F32.9 MAJOR DEPRESSIVE DISORDER, SINGLE EPISODE, UNSPECIFIED: ICD-10-CM

## 2021-11-24 PROCEDURE — 99214 OFFICE O/P EST MOD 30 MIN: CPT | Mod: 95

## 2021-12-22 ENCOUNTER — OUTPATIENT (OUTPATIENT)
Dept: OUTPATIENT SERVICES | Facility: HOSPITAL | Age: 61
LOS: 1 days | Discharge: HOME | End: 2021-12-22

## 2021-12-22 ENCOUNTER — APPOINTMENT (OUTPATIENT)
Dept: PSYCHIATRY | Facility: CLINIC | Age: 61
End: 2021-12-22
Payer: MEDICAID

## 2021-12-22 DIAGNOSIS — Z98.890 OTHER SPECIFIED POSTPROCEDURAL STATES: Chronic | ICD-10-CM

## 2021-12-22 DIAGNOSIS — F32.9 MAJOR DEPRESSIVE DISORDER, SINGLE EPISODE, UNSPECIFIED: ICD-10-CM

## 2021-12-22 DIAGNOSIS — K80.70 CALCULUS OF GALLBLADDER AND BILE DUCT WITHOUT CHOLECYSTITIS WITHOUT OBSTRUCTION: Chronic | ICD-10-CM

## 2021-12-22 DIAGNOSIS — F41.1 GENERALIZED ANXIETY DISORDER: ICD-10-CM

## 2021-12-22 DIAGNOSIS — F42.9 OBSESSIVE-COMPULSIVE DISORDER, UNSPECIFIED: ICD-10-CM

## 2021-12-22 PROCEDURE — 99214 OFFICE O/P EST MOD 30 MIN: CPT | Mod: 95

## 2022-01-12 ENCOUNTER — TRANSCRIPTION ENCOUNTER (OUTPATIENT)
Age: 62
End: 2022-01-12

## 2022-01-19 ENCOUNTER — APPOINTMENT (OUTPATIENT)
Dept: PSYCHIATRY | Facility: CLINIC | Age: 62
End: 2022-01-19
Payer: MEDICAID

## 2022-01-19 ENCOUNTER — OUTPATIENT (OUTPATIENT)
Dept: OUTPATIENT SERVICES | Facility: HOSPITAL | Age: 62
LOS: 1 days | Discharge: HOME | End: 2022-01-19

## 2022-01-19 DIAGNOSIS — F42.9 OBSESSIVE-COMPULSIVE DISORDER, UNSPECIFIED: ICD-10-CM

## 2022-01-19 DIAGNOSIS — F32.9 MAJOR DEPRESSIVE DISORDER, SINGLE EPISODE, UNSPECIFIED: ICD-10-CM

## 2022-01-19 DIAGNOSIS — K80.70 CALCULUS OF GALLBLADDER AND BILE DUCT WITHOUT CHOLECYSTITIS WITHOUT OBSTRUCTION: Chronic | ICD-10-CM

## 2022-01-19 DIAGNOSIS — F41.1 GENERALIZED ANXIETY DISORDER: ICD-10-CM

## 2022-01-19 DIAGNOSIS — Z98.890 OTHER SPECIFIED POSTPROCEDURAL STATES: Chronic | ICD-10-CM

## 2022-01-19 PROCEDURE — 99214 OFFICE O/P EST MOD 30 MIN: CPT | Mod: 95

## 2022-02-16 ENCOUNTER — APPOINTMENT (OUTPATIENT)
Dept: PSYCHIATRY | Facility: CLINIC | Age: 62
End: 2022-02-16
Payer: MEDICAID

## 2022-02-16 ENCOUNTER — OUTPATIENT (OUTPATIENT)
Dept: OUTPATIENT SERVICES | Facility: HOSPITAL | Age: 62
LOS: 1 days | Discharge: HOME | End: 2022-02-16

## 2022-02-16 DIAGNOSIS — K80.70 CALCULUS OF GALLBLADDER AND BILE DUCT WITHOUT CHOLECYSTITIS WITHOUT OBSTRUCTION: Chronic | ICD-10-CM

## 2022-02-16 DIAGNOSIS — F41.1 GENERALIZED ANXIETY DISORDER: ICD-10-CM

## 2022-02-16 DIAGNOSIS — F32.9 MAJOR DEPRESSIVE DISORDER, SINGLE EPISODE, UNSPECIFIED: ICD-10-CM

## 2022-02-16 DIAGNOSIS — Z98.890 OTHER SPECIFIED POSTPROCEDURAL STATES: Chronic | ICD-10-CM

## 2022-02-16 DIAGNOSIS — F42.9 OBSESSIVE-COMPULSIVE DISORDER, UNSPECIFIED: ICD-10-CM

## 2022-02-16 PROCEDURE — 99214 OFFICE O/P EST MOD 30 MIN: CPT | Mod: 95

## 2022-02-18 ENCOUNTER — OUTPATIENT (OUTPATIENT)
Dept: OUTPATIENT SERVICES | Facility: HOSPITAL | Age: 62
LOS: 1 days | Discharge: HOME | End: 2022-02-18

## 2022-02-18 DIAGNOSIS — K80.70 CALCULUS OF GALLBLADDER AND BILE DUCT WITHOUT CHOLECYSTITIS WITHOUT OBSTRUCTION: Chronic | ICD-10-CM

## 2022-02-18 DIAGNOSIS — Z98.890 OTHER SPECIFIED POSTPROCEDURAL STATES: Chronic | ICD-10-CM

## 2022-03-16 ENCOUNTER — APPOINTMENT (OUTPATIENT)
Dept: PSYCHIATRY | Facility: CLINIC | Age: 62
End: 2022-03-16

## 2022-03-18 ENCOUNTER — APPOINTMENT (OUTPATIENT)
Dept: PSYCHIATRY | Facility: CLINIC | Age: 62
End: 2022-03-18
Payer: MEDICAID

## 2022-03-18 ENCOUNTER — OUTPATIENT (OUTPATIENT)
Dept: OUTPATIENT SERVICES | Facility: HOSPITAL | Age: 62
LOS: 1 days | Discharge: HOME | End: 2022-03-18

## 2022-03-18 DIAGNOSIS — F32.9 MAJOR DEPRESSIVE DISORDER, SINGLE EPISODE, UNSPECIFIED: ICD-10-CM

## 2022-03-18 DIAGNOSIS — F42.9 OBSESSIVE-COMPULSIVE DISORDER, UNSPECIFIED: ICD-10-CM

## 2022-03-18 DIAGNOSIS — K80.70 CALCULUS OF GALLBLADDER AND BILE DUCT WITHOUT CHOLECYSTITIS WITHOUT OBSTRUCTION: Chronic | ICD-10-CM

## 2022-03-18 DIAGNOSIS — F41.1 GENERALIZED ANXIETY DISORDER: ICD-10-CM

## 2022-03-18 DIAGNOSIS — Z98.890 OTHER SPECIFIED POSTPROCEDURAL STATES: Chronic | ICD-10-CM

## 2022-03-18 PROCEDURE — 99214 OFFICE O/P EST MOD 30 MIN: CPT | Mod: 95

## 2022-04-13 ENCOUNTER — OUTPATIENT (OUTPATIENT)
Dept: OUTPATIENT SERVICES | Facility: HOSPITAL | Age: 62
LOS: 1 days | Discharge: HOME | End: 2022-04-13

## 2022-04-13 ENCOUNTER — APPOINTMENT (OUTPATIENT)
Dept: PSYCHIATRY | Facility: CLINIC | Age: 62
End: 2022-04-13
Payer: MEDICAID

## 2022-04-13 DIAGNOSIS — F41.1 GENERALIZED ANXIETY DISORDER: ICD-10-CM

## 2022-04-13 DIAGNOSIS — F42.9 OBSESSIVE-COMPULSIVE DISORDER, UNSPECIFIED: ICD-10-CM

## 2022-04-13 DIAGNOSIS — K80.70 CALCULUS OF GALLBLADDER AND BILE DUCT WITHOUT CHOLECYSTITIS WITHOUT OBSTRUCTION: Chronic | ICD-10-CM

## 2022-04-13 DIAGNOSIS — Z98.890 OTHER SPECIFIED POSTPROCEDURAL STATES: Chronic | ICD-10-CM

## 2022-04-13 DIAGNOSIS — F32.9 MAJOR DEPRESSIVE DISORDER, SINGLE EPISODE, UNSPECIFIED: ICD-10-CM

## 2022-04-13 PROCEDURE — 99214 OFFICE O/P EST MOD 30 MIN: CPT | Mod: 95

## 2022-05-06 ENCOUNTER — OUTPATIENT (OUTPATIENT)
Dept: OUTPATIENT SERVICES | Facility: HOSPITAL | Age: 62
LOS: 1 days | Discharge: HOME | End: 2022-05-06

## 2022-05-06 ENCOUNTER — APPOINTMENT (OUTPATIENT)
Dept: PSYCHIATRY | Facility: CLINIC | Age: 62
End: 2022-05-06

## 2022-05-06 DIAGNOSIS — F41.1 GENERALIZED ANXIETY DISORDER: ICD-10-CM

## 2022-05-06 DIAGNOSIS — F42.9 OBSESSIVE-COMPULSIVE DISORDER, UNSPECIFIED: ICD-10-CM

## 2022-05-06 DIAGNOSIS — F32.9 MAJOR DEPRESSIVE DISORDER, SINGLE EPISODE, UNSPECIFIED: ICD-10-CM

## 2022-05-06 DIAGNOSIS — Z98.890 OTHER SPECIFIED POSTPROCEDURAL STATES: Chronic | ICD-10-CM

## 2022-05-06 DIAGNOSIS — K80.70 CALCULUS OF GALLBLADDER AND BILE DUCT WITHOUT CHOLECYSTITIS WITHOUT OBSTRUCTION: Chronic | ICD-10-CM

## 2022-05-11 ENCOUNTER — APPOINTMENT (OUTPATIENT)
Dept: PSYCHIATRY | Facility: CLINIC | Age: 62
End: 2022-05-11
Payer: MEDICAID

## 2022-05-11 ENCOUNTER — OUTPATIENT (OUTPATIENT)
Dept: OUTPATIENT SERVICES | Facility: HOSPITAL | Age: 62
LOS: 1 days | Discharge: HOME | End: 2022-05-11

## 2022-05-11 DIAGNOSIS — F41.1 GENERALIZED ANXIETY DISORDER: ICD-10-CM

## 2022-05-11 DIAGNOSIS — Z98.890 OTHER SPECIFIED POSTPROCEDURAL STATES: Chronic | ICD-10-CM

## 2022-05-11 DIAGNOSIS — F42.9 OBSESSIVE-COMPULSIVE DISORDER, UNSPECIFIED: ICD-10-CM

## 2022-05-11 DIAGNOSIS — K80.70 CALCULUS OF GALLBLADDER AND BILE DUCT WITHOUT CHOLECYSTITIS WITHOUT OBSTRUCTION: Chronic | ICD-10-CM

## 2022-05-11 DIAGNOSIS — F32.9 MAJOR DEPRESSIVE DISORDER, SINGLE EPISODE, UNSPECIFIED: ICD-10-CM

## 2022-05-11 PROCEDURE — 99214 OFFICE O/P EST MOD 30 MIN: CPT | Mod: 95

## 2022-05-16 ENCOUNTER — NON-APPOINTMENT (OUTPATIENT)
Age: 62
End: 2022-05-16

## 2022-06-08 ENCOUNTER — OUTPATIENT (OUTPATIENT)
Dept: OUTPATIENT SERVICES | Facility: HOSPITAL | Age: 62
LOS: 1 days | Discharge: HOME | End: 2022-06-08

## 2022-06-08 ENCOUNTER — APPOINTMENT (OUTPATIENT)
Dept: PSYCHIATRY | Facility: CLINIC | Age: 62
End: 2022-06-08
Payer: MEDICAID

## 2022-06-08 DIAGNOSIS — Z98.890 OTHER SPECIFIED POSTPROCEDURAL STATES: Chronic | ICD-10-CM

## 2022-06-08 DIAGNOSIS — F41.1 GENERALIZED ANXIETY DISORDER: ICD-10-CM

## 2022-06-08 DIAGNOSIS — K80.70 CALCULUS OF GALLBLADDER AND BILE DUCT WITHOUT CHOLECYSTITIS WITHOUT OBSTRUCTION: Chronic | ICD-10-CM

## 2022-06-08 DIAGNOSIS — F42.9 OBSESSIVE-COMPULSIVE DISORDER, UNSPECIFIED: ICD-10-CM

## 2022-06-08 DIAGNOSIS — F32.9 MAJOR DEPRESSIVE DISORDER, SINGLE EPISODE, UNSPECIFIED: ICD-10-CM

## 2022-06-08 PROCEDURE — 99214 OFFICE O/P EST MOD 30 MIN: CPT

## 2022-07-06 ENCOUNTER — OUTPATIENT (OUTPATIENT)
Dept: OUTPATIENT SERVICES | Facility: HOSPITAL | Age: 62
LOS: 1 days | Discharge: HOME | End: 2022-07-06

## 2022-07-06 ENCOUNTER — APPOINTMENT (OUTPATIENT)
Dept: PSYCHIATRY | Facility: CLINIC | Age: 62
End: 2022-07-06

## 2022-07-06 DIAGNOSIS — F42.9 OBSESSIVE-COMPULSIVE DISORDER, UNSPECIFIED: ICD-10-CM

## 2022-07-06 DIAGNOSIS — Z98.890 OTHER SPECIFIED POSTPROCEDURAL STATES: Chronic | ICD-10-CM

## 2022-07-06 DIAGNOSIS — K80.70 CALCULUS OF GALLBLADDER AND BILE DUCT WITHOUT CHOLECYSTITIS WITHOUT OBSTRUCTION: Chronic | ICD-10-CM

## 2022-07-06 DIAGNOSIS — F32.9 MAJOR DEPRESSIVE DISORDER, SINGLE EPISODE, UNSPECIFIED: ICD-10-CM

## 2022-07-06 DIAGNOSIS — F41.1 GENERALIZED ANXIETY DISORDER: ICD-10-CM

## 2022-07-06 PROCEDURE — 99214 OFFICE O/P EST MOD 30 MIN: CPT | Mod: 95

## 2022-08-05 ENCOUNTER — OUTPATIENT (OUTPATIENT)
Dept: OUTPATIENT SERVICES | Facility: HOSPITAL | Age: 62
LOS: 1 days | Discharge: HOME | End: 2022-08-05

## 2022-08-05 ENCOUNTER — APPOINTMENT (OUTPATIENT)
Dept: PSYCHIATRY | Facility: CLINIC | Age: 62
End: 2022-08-05

## 2022-08-05 DIAGNOSIS — F41.1 GENERALIZED ANXIETY DISORDER: ICD-10-CM

## 2022-08-05 DIAGNOSIS — F42.9 OBSESSIVE-COMPULSIVE DISORDER, UNSPECIFIED: ICD-10-CM

## 2022-08-05 DIAGNOSIS — Z98.890 OTHER SPECIFIED POSTPROCEDURAL STATES: Chronic | ICD-10-CM

## 2022-08-05 DIAGNOSIS — K80.70 CALCULUS OF GALLBLADDER AND BILE DUCT WITHOUT CHOLECYSTITIS WITHOUT OBSTRUCTION: Chronic | ICD-10-CM

## 2022-08-05 DIAGNOSIS — F32.9 MAJOR DEPRESSIVE DISORDER, SINGLE EPISODE, UNSPECIFIED: ICD-10-CM

## 2022-08-05 PROCEDURE — 99214 OFFICE O/P EST MOD 30 MIN: CPT | Mod: 95

## 2022-09-02 ENCOUNTER — APPOINTMENT (OUTPATIENT)
Dept: PSYCHIATRY | Facility: CLINIC | Age: 62
End: 2022-09-02

## 2022-09-02 ENCOUNTER — OUTPATIENT (OUTPATIENT)
Dept: OUTPATIENT SERVICES | Facility: HOSPITAL | Age: 62
LOS: 1 days | Discharge: HOME | End: 2022-09-02

## 2022-09-02 DIAGNOSIS — F42.9 OBSESSIVE-COMPULSIVE DISORDER, UNSPECIFIED: ICD-10-CM

## 2022-09-02 DIAGNOSIS — Z98.890 OTHER SPECIFIED POSTPROCEDURAL STATES: Chronic | ICD-10-CM

## 2022-09-02 DIAGNOSIS — K80.70 CALCULUS OF GALLBLADDER AND BILE DUCT WITHOUT CHOLECYSTITIS WITHOUT OBSTRUCTION: Chronic | ICD-10-CM

## 2022-09-02 DIAGNOSIS — F41.1 GENERALIZED ANXIETY DISORDER: ICD-10-CM

## 2022-09-02 DIAGNOSIS — F32.9 MAJOR DEPRESSIVE DISORDER, SINGLE EPISODE, UNSPECIFIED: ICD-10-CM

## 2022-09-02 PROCEDURE — 99214 OFFICE O/P EST MOD 30 MIN: CPT | Mod: 95

## 2022-09-02 NOTE — ED ADULT NURSE NOTE - NSFALLRSKASSESSTYPE_ED_ALL_ED
Mr. Ema Wakefield is a 82 y.o. y/o male with history of PE who presents today for anticoagulation monitoring and adjustment. INR 2.2 is therapeutic for this patient (goal range 2-3) and is reflective of 32.5 mg TWD    Patient verifies current dosing regimen, patient able to verbally recall dose  Patient reports 0  missed doses since last INR   Patient denies s/sx clotting and/or stroke  Patient denies hematuria, epistaxis, rectal bleeding  Patient denies changes in diet, alcohol, or tobacco use  Reviewed medication list and drug allergies with patient, updated any medication additions or modifications accordingly  Patient also denies any pending medical or dental procedures scheduled at this time    Patient reports he had 2 beers yesterday as well as a shot of liqueur . He states that he switched pharmacy to [facility] in [facility].     Patient was instructed to continue current regimen of 2.5 mg on Wed and 5 mg all other days (TWD of 32.5 mg) and RTC in 6 weeks    Ashley HardyD  PGY-1 Resident    For Pharmacy Admin Tracking Only    Intervention Detail: Adherence Monitorin  Total # of Interventions Recommended: 1  Total # of Interventions Accepted: 1  Time Spent (min): 30
Initial (On Arrival)

## 2022-09-30 ENCOUNTER — OUTPATIENT (OUTPATIENT)
Dept: OUTPATIENT SERVICES | Facility: HOSPITAL | Age: 62
LOS: 1 days | Discharge: HOME | End: 2022-09-30

## 2022-09-30 ENCOUNTER — APPOINTMENT (OUTPATIENT)
Dept: PSYCHIATRY | Facility: CLINIC | Age: 62
End: 2022-09-30

## 2022-09-30 DIAGNOSIS — Z98.890 OTHER SPECIFIED POSTPROCEDURAL STATES: Chronic | ICD-10-CM

## 2022-09-30 DIAGNOSIS — F32.9 MAJOR DEPRESSIVE DISORDER, SINGLE EPISODE, UNSPECIFIED: ICD-10-CM

## 2022-09-30 DIAGNOSIS — F41.1 GENERALIZED ANXIETY DISORDER: ICD-10-CM

## 2022-09-30 DIAGNOSIS — F42.9 OBSESSIVE-COMPULSIVE DISORDER, UNSPECIFIED: ICD-10-CM

## 2022-09-30 DIAGNOSIS — K80.70 CALCULUS OF GALLBLADDER AND BILE DUCT WITHOUT CHOLECYSTITIS WITHOUT OBSTRUCTION: Chronic | ICD-10-CM

## 2022-09-30 PROCEDURE — 99214 OFFICE O/P EST MOD 30 MIN: CPT | Mod: 95

## 2022-10-12 NOTE — PRE-ANESTHESIA EVALUATION ADULT - NSANTHTOBACCOSD_GEN_ALL_CORE
Addended by: EDWIN PHILLIP on: 10/12/2022 11:41 AM     Modules accepted: Orders     on and off x 30 years/Yes

## 2022-10-28 ENCOUNTER — APPOINTMENT (OUTPATIENT)
Dept: PSYCHIATRY | Facility: CLINIC | Age: 62
End: 2022-10-28

## 2022-10-28 ENCOUNTER — OUTPATIENT (OUTPATIENT)
Dept: OUTPATIENT SERVICES | Facility: HOSPITAL | Age: 62
LOS: 1 days | Discharge: HOME | End: 2022-10-28

## 2022-10-28 DIAGNOSIS — F32.9 MAJOR DEPRESSIVE DISORDER, SINGLE EPISODE, UNSPECIFIED: ICD-10-CM

## 2022-10-28 DIAGNOSIS — K80.70 CALCULUS OF GALLBLADDER AND BILE DUCT WITHOUT CHOLECYSTITIS WITHOUT OBSTRUCTION: Chronic | ICD-10-CM

## 2022-10-28 DIAGNOSIS — F42.9 OBSESSIVE-COMPULSIVE DISORDER, UNSPECIFIED: ICD-10-CM

## 2022-10-28 DIAGNOSIS — F41.1 GENERALIZED ANXIETY DISORDER: ICD-10-CM

## 2022-10-28 DIAGNOSIS — Z98.890 OTHER SPECIFIED POSTPROCEDURAL STATES: Chronic | ICD-10-CM

## 2022-10-28 PROCEDURE — 99214 OFFICE O/P EST MOD 30 MIN: CPT | Mod: 95

## 2022-10-28 NOTE — CURRENT PSYCHIATRIC SYMPTOMS
[Depressed Mood] : no depressed mood [Anhedonia] : no anhedonia [Guilt] : not feeling guilty [Decreased Concentration] : no decrease in concentrating ability [Hyperphagia] : no hyperphagia [Insomnia] : no insomnia disorder [Hypersomnia] : no ~T hypersomnia [Psychomotor Retardation] : no psychomotor retardation [Anorexia] : no anorexia [Euphoria] : no euphoria [Highly Irritable] : no high irritability [Increased Activity] : no increased in activity [Distractibility] : not distracted [Talkativeness] : no talkativeness [Grandeur] : no feelings of grandeur [Buying Sprees] : no buying sprees [Hypersexuality] : denied hypersexuality [Dec Need For Sleep] : no decreased need for sleep [Delusions] : no ~T delusions [Hallucination Visual] : no visual hallucinations [Hallucination Auditory] : no auditory hallucinations [Hallucination Tactile] : no tactile hallucinations [Thought Disorder] : ~T a thought disorder was not noted [Excessive Worry] : no excessive worries [Ruminations] : no rumination disorder [Obsessions] : obsessions [Re-experiencing] : no re-experiencing [Restlessness] : no restlessness [Hypochondriasis] : no hypochondriasis [Panic] : no panic disorder [de-identified] : concerning medical problems, is seeing providers

## 2022-10-28 NOTE — REASON FOR VISIT
[FreeTextEntry1] : "I am ok." [Home] : at home, [unfilled] , at the time of the visit. [Medical Office: ([facility])___] : at the medical office located in  [Verbal consent obtained from patient] : the patient, [unfilled]

## 2022-10-28 NOTE — ASSESSMENT
[FreeTextEntry1] : Patient is fairly stable, but issue with pain management, at baseline.\par \par Current medications\par 1.lexapro 20mg po daily (understands to watch for serotonin syndrome)\par 2. mirtazapine 15mg at bedtime, will hold for now, and resume if needed\par 3. klonopin 0.5mg po BID\par 4. loratidine 10mg daily (for skin itchiness)\par \par follow up in 4 weeks\par

## 2022-10-28 NOTE — HISTORY OF PRESENT ILLNESS
[FreeTextEntry1] : This is a 62 year old patient who presents for medication management.  The patient reports fairly stable mood, good, sleep and appetite.  The patient denies any symptoms of depression, soco, or psychosis at this time.  The patient relays less anxiety that impairs their daily functioning. The patient denies any suicidal ideation, homicidal ideation, no auditory or visual hallucinations, or paranoid ideation.  The patient has chronic medical complaints. The patient denies any drug or alcohol use, and is not smoking at this time. I requested the patient's updated physical and labs from their PCP.  Coping skills were discussed and a safety plan was provided.  The patient was educated on the risks, benefits, and alternatives of their psychiatric medications.  The patient will not engage in psychotherapy at this time.  The patient consents to ongoing medication management.  Patient reports being at baseline otherwise, support provided.  \par \par  [No] : no

## 2022-10-28 NOTE — RESULTS/DATA
[FreeTextEntry1] : physical exam and labs completed 2/18/20\par labs 3/5/19 all wnl: CBC, CMP, TSH, HbA1C
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
## 2022-10-28 NOTE — PAST MEDICAL HISTORY
[FreeTextEntry1] : 5 hospitalizations, last Oct 2005, 2 suicide attempts last Oct 2005,  MDD episodes Spring 2019 and again Dec 2019.  Past meds: Prozac (diarrhea), Seroquel, Ambien (parasomina behaviors, confusion)

## 2022-10-28 NOTE — PHYSICAL EXAM
[None] : none [Loud] : not loud [Dysphoric] : not dysphoric [Labile] : not labile [Normal] : good [Fair] : fair [FreeTextEntry8] : "good" [FreeTextEntry9] : labile

## 2022-11-25 ENCOUNTER — APPOINTMENT (OUTPATIENT)
Dept: PSYCHIATRY | Facility: CLINIC | Age: 62
End: 2022-11-25

## 2022-11-25 ENCOUNTER — OUTPATIENT (OUTPATIENT)
Dept: OUTPATIENT SERVICES | Facility: HOSPITAL | Age: 62
LOS: 1 days | Discharge: HOME | End: 2022-11-25

## 2022-11-25 DIAGNOSIS — F32.9 MAJOR DEPRESSIVE DISORDER, SINGLE EPISODE, UNSPECIFIED: ICD-10-CM

## 2022-11-25 DIAGNOSIS — F42.9 OBSESSIVE-COMPULSIVE DISORDER, UNSPECIFIED: ICD-10-CM

## 2022-11-25 DIAGNOSIS — Z98.890 OTHER SPECIFIED POSTPROCEDURAL STATES: Chronic | ICD-10-CM

## 2022-11-25 DIAGNOSIS — F41.1 GENERALIZED ANXIETY DISORDER: ICD-10-CM

## 2022-11-25 DIAGNOSIS — K80.70 CALCULUS OF GALLBLADDER AND BILE DUCT WITHOUT CHOLECYSTITIS WITHOUT OBSTRUCTION: Chronic | ICD-10-CM

## 2022-11-25 PROCEDURE — 99214 OFFICE O/P EST MOD 30 MIN: CPT | Mod: 95

## 2022-11-25 NOTE — REASON FOR VISIT
[FreeTextEntry1] : "I am ok." [Home] : at home, [unfilled] , at the time of the visit. [Medical Office: ([facility])___] : at the medical office located in  [Verbal consent obtained from patient] : the patient, [unfilled]

## 2022-11-25 NOTE — CURRENT PSYCHIATRIC SYMPTOMS
[Depressed Mood] : no depressed mood [Anhedonia] : no anhedonia [Guilt] : not feeling guilty [Decreased Concentration] : no decrease in concentrating ability [Hyperphagia] : no hyperphagia [Insomnia] : no insomnia disorder [Hypersomnia] : no ~T hypersomnia [Psychomotor Retardation] : no psychomotor retardation [Anorexia] : no anorexia [Euphoria] : no euphoria [Highly Irritable] : no high irritability [Increased Activity] : no increased in activity [Distractibility] : not distracted [Talkativeness] : no talkativeness [Grandeur] : no feelings of grandeur [Buying Sprees] : no buying sprees [Hypersexuality] : denied hypersexuality [Dec Need For Sleep] : no decreased need for sleep [Delusions] : no ~T delusions [Hallucination Visual] : no visual hallucinations [Hallucination Auditory] : no auditory hallucinations [Hallucination Tactile] : no tactile hallucinations [Thought Disorder] : ~T a thought disorder was not noted [Excessive Worry] : no excessive worries [Ruminations] : no rumination disorder [Obsessions] : obsessions [Re-experiencing] : no re-experiencing [Restlessness] : no restlessness [Hypochondriasis] : no hypochondriasis [Panic] : no panic disorder [de-identified] : concerning medical problems, is seeing providers

## 2022-11-25 NOTE — CURRENT PSYCHIATRIC SYMPTOMS
[Depressed Mood] : no depressed mood [Anhedonia] : no anhedonia [Guilt] : not feeling guilty [Decreased Concentration] : no decrease in concentrating ability [Hyperphagia] : no hyperphagia [Insomnia] : no insomnia disorder [Hypersomnia] : no ~T hypersomnia [Psychomotor Retardation] : no psychomotor retardation [Anorexia] : no anorexia [Euphoria] : no euphoria [Highly Irritable] : no high irritability [Increased Activity] : no increased in activity [Distractibility] : not distracted [Talkativeness] : no talkativeness [Grandeur] : no feelings of grandeur [Buying Sprees] : no buying sprees [Hypersexuality] : denied hypersexuality [Dec Need For Sleep] : no decreased need for sleep [Delusions] : no ~T delusions [Hallucination Visual] : no visual hallucinations [Hallucination Auditory] : no auditory hallucinations [Hallucination Tactile] : no tactile hallucinations [Thought Disorder] : ~T a thought disorder was not noted [Excessive Worry] : no excessive worries [Ruminations] : no rumination disorder [Obsessions] : obsessions [Re-experiencing] : no re-experiencing [Restlessness] : no restlessness [Hypochondriasis] : no hypochondriasis [Panic] : no panic disorder [de-identified] : concerning medical problems, is seeing providers

## 2022-11-25 NOTE — DISCUSSION/SUMMARY
[Able to manage surrounding demands and opportunities] : able to manage surrounding demands and opportunities [Able to exercise self-direction] : able to exercise self-direction [Able to set and pursue goals] : able to set and pursue goals [Adherent to treatment recommendations] : adherent to treatment recommendations [Articulate] : articulate [Attempting to realize their potential] : Attempting to realize their potential [Awareness of substance use issues] : awareness of substance use issues [Cognitively intact] : cognitively intact [Good impulse control] : good impulse control [Insightful] : insightful [Creative] : creative [Intelligent] : intelligent [Motivated to participate in treatment] : motivated to participate in treatment [Motivated and ready for change] : motivated and ready for change [Health literacy] : health literacy [Motivated to maintain or improve physical health] : motivated to maintain or improve physical health [In good health] : in good health [Has a supportive network] : has a supportive network [Housing stability] : housing stability [English fluency] : English fluency [Connected to healthcare] : connected to healthcare [Good health literacy] : good health literacy [Access to safe outdoor spaces] : access to safe outdoor spaces [Social supports] : social supports [FreeTextEntry2] : 1/15/23 [FreeTextEntry3] : see chart [FreeTextEntry5] : stable mood, free of anxiety [Mental Health] : Mental Health [Physical Health] : Physical Health [Other rationale for transition/discharge:] : Other rationale for transition/discharge: [No - Explain] : No [None - Reason others did not participate:] : None - Reason others did not participate:  [Psychiatric Provider/Prescriber] : Psychiatric Provider/Prescriber [FreeTextEntry1] : see above [de-identified] : chronic condition

## 2022-11-25 NOTE — DISCUSSION/SUMMARY
[Able to manage surrounding demands and opportunities] : able to manage surrounding demands and opportunities [Able to exercise self-direction] : able to exercise self-direction [Able to set and pursue goals] : able to set and pursue goals [Adherent to treatment recommendations] : adherent to treatment recommendations [Articulate] : articulate [Attempting to realize their potential] : Attempting to realize their potential [Awareness of substance use issues] : awareness of substance use issues [Cognitively intact] : cognitively intact [Good impulse control] : good impulse control [Insightful] : insightful [Creative] : creative [Intelligent] : intelligent [Motivated to participate in treatment] : motivated to participate in treatment [Motivated and ready for change] : motivated and ready for change [Health literacy] : health literacy [Motivated to maintain or improve physical health] : motivated to maintain or improve physical health [In good health] : in good health [Has a supportive network] : has a supportive network [Housing stability] : housing stability [English fluency] : English fluency [Connected to healthcare] : connected to healthcare [Good health literacy] : good health literacy [Access to safe outdoor spaces] : access to safe outdoor spaces [Social supports] : social supports [FreeTextEntry2] : 1/15/23 [FreeTextEntry3] : see chart [FreeTextEntry5] : stable mood, free of anxiety [Mental Health] : Mental Health [Physical Health] : Physical Health [Other rationale for transition/discharge:] : Other rationale for transition/discharge: [No - Explain] : No [None - Reason others did not participate:] : None - Reason others did not participate:  [Psychiatric Provider/Prescriber] : Psychiatric Provider/Prescriber [FreeTextEntry1] : see above [de-identified] : chronic condition

## 2022-11-25 NOTE — PLAN
[FreeTextEntry4] : mood is ok\par \par anxiety is chronic, low\par \par ongoing health issues, sees specialists

## 2022-11-25 NOTE — REASON FOR VISIT
[FreeTextEntry1] : "I am ok." [Home] : at home, [unfilled] , at the time of the visit. [Medical Office: ([facility])___] : at the medical office located in  [Verbal consent obtained from patient] : the patient, [unfilled]

## 2022-12-23 ENCOUNTER — APPOINTMENT (OUTPATIENT)
Dept: PSYCHIATRY | Facility: CLINIC | Age: 62
End: 2022-12-23

## 2022-12-23 ENCOUNTER — OUTPATIENT (OUTPATIENT)
Dept: OUTPATIENT SERVICES | Facility: HOSPITAL | Age: 62
LOS: 1 days | Discharge: HOME | End: 2022-12-23

## 2022-12-23 DIAGNOSIS — Z98.890 OTHER SPECIFIED POSTPROCEDURAL STATES: Chronic | ICD-10-CM

## 2022-12-23 DIAGNOSIS — F32.9 MAJOR DEPRESSIVE DISORDER, SINGLE EPISODE, UNSPECIFIED: ICD-10-CM

## 2022-12-23 DIAGNOSIS — K80.70 CALCULUS OF GALLBLADDER AND BILE DUCT WITHOUT CHOLECYSTITIS WITHOUT OBSTRUCTION: Chronic | ICD-10-CM

## 2022-12-23 DIAGNOSIS — F41.1 GENERALIZED ANXIETY DISORDER: ICD-10-CM

## 2022-12-23 DIAGNOSIS — F42.9 OBSESSIVE-COMPULSIVE DISORDER, UNSPECIFIED: ICD-10-CM

## 2022-12-23 PROCEDURE — 99214 OFFICE O/P EST MOD 30 MIN: CPT | Mod: 95

## 2022-12-23 NOTE — REASON FOR VISIT
[FreeTextEntry1] : "I am much better." [Home] : at home, [unfilled] , at the time of the visit. [Medical Office: ([facility])___] : at the medical office located in  [Verbal consent obtained from patient] : the patient, [unfilled]

## 2022-12-23 NOTE — REASON FOR VISIT
[FreeTextEntry1] : "I am much better." [Home] : at home, [unfilled] , at the time of the visit. [Medical Office: ([facility])___] : at the medical office located in  [Verbal consent obtained from patient] : the patient, [unfilled]

## 2022-12-23 NOTE — CURRENT PSYCHIATRIC SYMPTOMS
[Depressed Mood] : no depressed mood [Anhedonia] : no anhedonia [Guilt] : not feeling guilty [Decreased Concentration] : no decrease in concentrating ability [Hyperphagia] : no hyperphagia [Insomnia] : no insomnia disorder [Hypersomnia] : no ~T hypersomnia [Psychomotor Retardation] : no psychomotor retardation [Anorexia] : no anorexia [Euphoria] : no euphoria [Highly Irritable] : no high irritability [Increased Activity] : no increased in activity [Distractibility] : not distracted [Talkativeness] : no talkativeness [Grandeur] : no feelings of grandeur [Buying Sprees] : no buying sprees [Hypersexuality] : denied hypersexuality [Dec Need For Sleep] : no decreased need for sleep [Delusions] : no ~T delusions [Hallucination Visual] : no visual hallucinations [Hallucination Auditory] : no auditory hallucinations [Hallucination Tactile] : no tactile hallucinations [Thought Disorder] : ~T a thought disorder was not noted [Excessive Worry] : no excessive worries [Ruminations] : no rumination disorder [Obsessions] : obsessions [Re-experiencing] : no re-experiencing [Restlessness] : no restlessness [Hypochondriasis] : no hypochondriasis [Panic] : no panic disorder [de-identified] : concerning medical problems, is seeing providers

## 2022-12-23 NOTE — CURRENT PSYCHIATRIC SYMPTOMS
[Depressed Mood] : no depressed mood [Anhedonia] : no anhedonia [Guilt] : not feeling guilty [Decreased Concentration] : no decrease in concentrating ability [Hyperphagia] : no hyperphagia [Insomnia] : no insomnia disorder [Hypersomnia] : no ~T hypersomnia [Psychomotor Retardation] : no psychomotor retardation [Anorexia] : no anorexia [Euphoria] : no euphoria [Highly Irritable] : no high irritability [Increased Activity] : no increased in activity [Distractibility] : not distracted [Talkativeness] : no talkativeness [Grandeur] : no feelings of grandeur [Buying Sprees] : no buying sprees [Hypersexuality] : denied hypersexuality [Dec Need For Sleep] : no decreased need for sleep [Delusions] : no ~T delusions [Hallucination Visual] : no visual hallucinations [Hallucination Auditory] : no auditory hallucinations [Hallucination Tactile] : no tactile hallucinations [Thought Disorder] : ~T a thought disorder was not noted [Excessive Worry] : no excessive worries [Ruminations] : no rumination disorder [Obsessions] : obsessions [Re-experiencing] : no re-experiencing [Restlessness] : no restlessness [Hypochondriasis] : no hypochondriasis [Panic] : no panic disorder [de-identified] : concerning medical problems, is seeing providers

## 2022-12-23 NOTE — HISTORY OF PRESENT ILLNESS
[FreeTextEntry1] : This is a 62 year old patient who presents for medication management.  The patient reports fairly stable mood, good, sleep and appetite.  The patient denies any symptoms of depression, soco, or psychosis at this time.  The patient relays less anxiety that impairs their daily functioning. The patient denies any suicidal ideation, homicidal ideation, no auditory or visual hallucinations, or paranoid ideation.  The patient has chronic medical complaints. The patient denies any drug or alcohol use, and is not smoking at this time. I requested the patient's updated physical and labs from their PCP.  Coping skills were discussed and a safety plan was provided.  The patient was educated on the risks, benefits, and alternatives of their psychiatric medications.  The patient will not engage in psychotherapy at this time.  The patient consents to ongoing medication management.  Patient reports being at baseline otherwise, support provided.  \par \par Patient has been seeing medical specialists, doing better.\par \par  [No] : no

## 2022-12-23 NOTE — DISCUSSION/SUMMARY
[Denied] : Denied [No] : No [Yes: Details:___] : Yes: [unfilled] [Completed, copy requested] : Completed, copy requested [Ordered] : Ordered [Yes] : Yes [Patient is not prescribed antipsychotic medication] : Patient is not prescribed antipsychotic medication [Does patient use tobacco products?] : Patient does not use tobacco products [Does patient use medical marijuana?] : Patient does not use medical marijuana. [Patient has been tested for HIV?] : Patient has not been tested for HIV [Patient has been tested for Hepatitis?] : Patient has not been tested for hepatitis [Patient would like to be tested/re-tested?] : patient would not like to be tested/re-tested [Current or past COVID-19 diagnosis?] : Patient does not have a current or past COVID-19 diagnosis [Vaccinated?] : is not vaccinated [Education provided about COVID-19?] : Education was not provided about COVID-19

## 2023-01-20 ENCOUNTER — OUTPATIENT (OUTPATIENT)
Dept: OUTPATIENT SERVICES | Facility: HOSPITAL | Age: 63
LOS: 1 days | Discharge: HOME | End: 2023-01-20

## 2023-01-20 ENCOUNTER — APPOINTMENT (OUTPATIENT)
Dept: PSYCHIATRY | Facility: CLINIC | Age: 63
End: 2023-01-20
Payer: MEDICAID

## 2023-01-20 DIAGNOSIS — F32.9 MAJOR DEPRESSIVE DISORDER, SINGLE EPISODE, UNSPECIFIED: ICD-10-CM

## 2023-01-20 DIAGNOSIS — Z98.890 OTHER SPECIFIED POSTPROCEDURAL STATES: Chronic | ICD-10-CM

## 2023-01-20 DIAGNOSIS — F41.1 GENERALIZED ANXIETY DISORDER: ICD-10-CM

## 2023-01-20 DIAGNOSIS — K80.70 CALCULUS OF GALLBLADDER AND BILE DUCT WITHOUT CHOLECYSTITIS WITHOUT OBSTRUCTION: Chronic | ICD-10-CM

## 2023-01-20 DIAGNOSIS — F42.9 OBSESSIVE-COMPULSIVE DISORDER, UNSPECIFIED: ICD-10-CM

## 2023-01-20 PROCEDURE — 99214 OFFICE O/P EST MOD 30 MIN: CPT | Mod: 95

## 2023-01-20 NOTE — HISTORY OF PRESENT ILLNESS
[FreeTextEntry1] : This is a 63 year old patient who presents for medication management.  The patient reports fairly stable mood, good, sleep and appetite.  The patient denies any symptoms of depression, soco, or psychosis at this time.  The patient relays less anxiety that impairs their daily functioning. The patient denies any suicidal ideation, homicidal ideation, no auditory or visual hallucinations, or paranoid ideation.  The patient has chronic medical complaints. The patient denies any drug or alcohol use, and is not smoking at this time. I requested the patient's updated physical and labs from their PCP.  Coping skills were discussed and a safety plan was provided.  The patient was educated on the risks, benefits, and alternatives of their psychiatric medications.  The patient will not engage in psychotherapy at this time.  The patient consents to ongoing medication management.  Patient reports being at baseline otherwise, support provided.  \par \par Patient has been seeing medical specialists, doing better.\par \par  [No] : no

## 2023-01-20 NOTE — REASON FOR VISIT
[FreeTextEntry1] : "I am good, got a new PMD." [Home] : at home, [unfilled] , at the time of the visit. [Medical Office: ([facility])___] : at the medical office located in  [Verbal consent obtained from patient] : the patient, [unfilled]

## 2023-01-20 NOTE — CURRENT PSYCHIATRIC SYMPTOMS
[Depressed Mood] : no depressed mood [Anhedonia] : no anhedonia [Guilt] : not feeling guilty [Decreased Concentration] : no decrease in concentrating ability [Hyperphagia] : no hyperphagia [Insomnia] : no insomnia disorder [Hypersomnia] : no ~T hypersomnia [Psychomotor Retardation] : no psychomotor retardation [Anorexia] : no anorexia [Euphoria] : no euphoria [Highly Irritable] : no high irritability [Increased Activity] : no increased in activity [Distractibility] : not distracted [Talkativeness] : no talkativeness [Grandeur] : no feelings of grandeur [Buying Sprees] : no buying sprees [Hypersexuality] : denied hypersexuality [Dec Need For Sleep] : no decreased need for sleep [Delusions] : no ~T delusions [Hallucination Visual] : no visual hallucinations [Hallucination Auditory] : no auditory hallucinations [Hallucination Tactile] : no tactile hallucinations [Thought Disorder] : ~T a thought disorder was not noted [Excessive Worry] : no excessive worries [Ruminations] : no rumination disorder [Obsessions] : obsessions [Re-experiencing] : no re-experiencing [Restlessness] : no restlessness [Hypochondriasis] : no hypochondriasis [Panic] : no panic disorder [de-identified] : concerning medical problems, is seeing providers

## 2023-02-17 ENCOUNTER — OUTPATIENT (OUTPATIENT)
Dept: OUTPATIENT SERVICES | Facility: HOSPITAL | Age: 63
LOS: 1 days | End: 2023-02-17
Payer: COMMERCIAL

## 2023-02-17 ENCOUNTER — APPOINTMENT (OUTPATIENT)
Dept: PSYCHIATRY | Facility: CLINIC | Age: 63
End: 2023-02-17
Payer: COMMERCIAL

## 2023-02-17 ENCOUNTER — APPOINTMENT (OUTPATIENT)
Dept: PSYCHIATRY | Facility: CLINIC | Age: 63
End: 2023-02-17

## 2023-02-17 DIAGNOSIS — F41.1 GENERALIZED ANXIETY DISORDER: ICD-10-CM

## 2023-02-17 DIAGNOSIS — K80.70 CALCULUS OF GALLBLADDER AND BILE DUCT WITHOUT CHOLECYSTITIS WITHOUT OBSTRUCTION: Chronic | ICD-10-CM

## 2023-02-17 DIAGNOSIS — F42.9 OBSESSIVE-COMPULSIVE DISORDER, UNSPECIFIED: ICD-10-CM

## 2023-02-17 DIAGNOSIS — F32.9 MAJOR DEPRESSIVE DISORDER, SINGLE EPISODE, UNSPECIFIED: ICD-10-CM

## 2023-02-17 DIAGNOSIS — Z98.890 OTHER SPECIFIED POSTPROCEDURAL STATES: Chronic | ICD-10-CM

## 2023-02-17 PROCEDURE — 99214 OFFICE O/P EST MOD 30 MIN: CPT | Mod: 95

## 2023-02-17 NOTE — REASON FOR VISIT
[FreeTextEntry1] : "I am ok." [Home] : at home, [unfilled] , at the time of the visit. [Medical Office: ([facility])___] : at the medical office located in  [Verbal consent obtained from patient] : the patient, [unfilled]

## 2023-02-17 NOTE — DISCUSSION/SUMMARY
[Able to manage surrounding demands and opportunities] : able to manage surrounding demands and opportunities [Able to exercise self-direction] : able to exercise self-direction [Able to set and pursue goals] : able to set and pursue goals [Adherent to treatment recommendations] : adherent to treatment recommendations [Articulate] : articulate [Attempting to realize their potential] : Attempting to realize their potential [Awareness of substance use issues] : awareness of substance use issues [Cognitively intact] : cognitively intact [Good impulse control] : good impulse control [Insightful] : insightful [Creative] : creative [Intelligent] : intelligent [Motivated to participate in treatment] : motivated to participate in treatment [Motivated and ready for change] : motivated and ready for change [Health literacy] : health literacy [Motivated to maintain or improve physical health] : motivated to maintain or improve physical health [In good health] : in good health [Housing stability] : housing stability [English fluency] : English fluency [Connected to healthcare] : connected to healthcare [Access to safe outdoor spaces] : access to safe outdoor spaces [Social supports] : social supports [FreeTextEntry2] : 4/15/23 [FreeTextEntry3] : see chart [FreeTextEntry5] : stable mood, free of anxiety [Mental Health] : Mental Health [Physical Health] : Physical Health [Other rationale for transition/discharge:] : Other rationale for transition/discharge: [No - Explain] : No [None - Reason others did not participate:] : None - Reason others did not participate:  [Psychiatric Provider/Prescriber] : Psychiatric Provider/Prescriber [FreeTextEntry1] : see above [de-identified] : chronic condition

## 2023-02-17 NOTE — DISCUSSION/SUMMARY
[Able to manage surrounding demands and opportunities] : able to manage surrounding demands and opportunities [Able to exercise self-direction] : able to exercise self-direction [Able to set and pursue goals] : able to set and pursue goals [Adherent to treatment recommendations] : adherent to treatment recommendations [Articulate] : articulate [Attempting to realize their potential] : Attempting to realize their potential [Awareness of substance use issues] : awareness of substance use issues [Cognitively intact] : cognitively intact [Good impulse control] : good impulse control [Insightful] : insightful [Creative] : creative [Intelligent] : intelligent [Motivated to participate in treatment] : motivated to participate in treatment [Motivated and ready for change] : motivated and ready for change [Health literacy] : health literacy [Motivated to maintain or improve physical health] : motivated to maintain or improve physical health [In good health] : in good health [Housing stability] : housing stability [English fluency] : English fluency [Connected to healthcare] : connected to healthcare [Access to safe outdoor spaces] : access to safe outdoor spaces [Social supports] : social supports [FreeTextEntry2] : 4/15/23 [FreeTextEntry3] : see chart [FreeTextEntry5] : stable mood, free of anxiety [Mental Health] : Mental Health [Physical Health] : Physical Health [Other rationale for transition/discharge:] : Other rationale for transition/discharge: [No - Explain] : No [None - Reason others did not participate:] : None - Reason others did not participate:  [Psychiatric Provider/Prescriber] : Psychiatric Provider/Prescriber [FreeTextEntry1] : see above [de-identified] : chronic condition

## 2023-02-17 NOTE — REASON FOR VISIT
[FreeTextEntry1] : "I am ok." [Home] : at home, [unfilled] , at the time of the visit. [Medical Office: ([facility])___] : at the medical office located in  [Verbal consent obtained from patient] : the patient, [unfilled]

## 2023-02-17 NOTE — CURRENT PSYCHIATRIC SYMPTOMS
[Depressed Mood] : no depressed mood [Anhedonia] : no anhedonia [Guilt] : not feeling guilty [Decreased Concentration] : no decrease in concentrating ability [Hyperphagia] : no hyperphagia [Insomnia] : no insomnia disorder [Hypersomnia] : no ~T hypersomnia [Psychomotor Retardation] : no psychomotor retardation [Anorexia] : no anorexia [Euphoria] : no euphoria [Highly Irritable] : no high irritability [Increased Activity] : no increased in activity [Distractibility] : not distracted [Talkativeness] : no talkativeness [Grandeur] : no feelings of grandeur [Buying Sprees] : no buying sprees [Hypersexuality] : denied hypersexuality [Dec Need For Sleep] : no decreased need for sleep [Delusions] : no ~T delusions [Hallucination Visual] : no visual hallucinations [Hallucination Auditory] : no auditory hallucinations [Hallucination Tactile] : no tactile hallucinations [Thought Disorder] : ~T a thought disorder was not noted [Excessive Worry] : no excessive worries [Ruminations] : no rumination disorder [Obsessions] : obsessions [Re-experiencing] : no re-experiencing [Restlessness] : no restlessness [Hypochondriasis] : no hypochondriasis [Panic] : no panic disorder [de-identified] : concerning medical problems, is seeing providers

## 2023-02-28 DIAGNOSIS — F32.9 MAJOR DEPRESSIVE DISORDER, SINGLE EPISODE, UNSPECIFIED: ICD-10-CM

## 2023-02-28 DIAGNOSIS — F42.9 OBSESSIVE-COMPULSIVE DISORDER, UNSPECIFIED: ICD-10-CM

## 2023-02-28 DIAGNOSIS — F41.1 GENERALIZED ANXIETY DISORDER: ICD-10-CM

## 2023-03-17 ENCOUNTER — OUTPATIENT (OUTPATIENT)
Dept: OUTPATIENT SERVICES | Facility: HOSPITAL | Age: 63
LOS: 1 days | End: 2023-03-17
Payer: COMMERCIAL

## 2023-03-17 ENCOUNTER — APPOINTMENT (OUTPATIENT)
Dept: PSYCHIATRY | Facility: CLINIC | Age: 63
End: 2023-03-17
Payer: COMMERCIAL

## 2023-03-17 DIAGNOSIS — Z98.890 OTHER SPECIFIED POSTPROCEDURAL STATES: Chronic | ICD-10-CM

## 2023-03-17 DIAGNOSIS — K80.70 CALCULUS OF GALLBLADDER AND BILE DUCT WITHOUT CHOLECYSTITIS WITHOUT OBSTRUCTION: Chronic | ICD-10-CM

## 2023-03-17 DIAGNOSIS — F32.9 MAJOR DEPRESSIVE DISORDER, SINGLE EPISODE, UNSPECIFIED: ICD-10-CM

## 2023-03-17 DIAGNOSIS — F41.1 GENERALIZED ANXIETY DISORDER: ICD-10-CM

## 2023-03-17 DIAGNOSIS — F42.9 OBSESSIVE-COMPULSIVE DISORDER, UNSPECIFIED: ICD-10-CM

## 2023-03-17 PROCEDURE — 99214 OFFICE O/P EST MOD 30 MIN: CPT | Mod: 95

## 2023-03-17 NOTE — REASON FOR VISIT
[Telephone (audio) - Individual/Group] : This telephonic visit was provided via audio only technology. [Technical or other issues] : Patient unable to effectively utilize tele-video due to technical or other issues. [Verbal consent obtained from patient] : the patient, [unfilled] [This encounter was initiated by telehealth (audio with video) and converted to telephone (audio only) due to technical difficulties.] : This encounter was initiated by telehealth (audio with video) and converted to telephone (audio only) due to technical difficulties. [FreeTextEntry1] : "I am well, my phone broke and I can't do video today"

## 2023-03-17 NOTE — CURRENT PSYCHIATRIC SYMPTOMS
[Obsessions] : obsessions [Depressed Mood] : no depressed mood [Anhedonia] : no anhedonia [Guilt] : not feeling guilty [Decreased Concentration] : no decrease in concentrating ability [Hyperphagia] : no hyperphagia [Insomnia] : no insomnia disorder [Hypersomnia] : no ~T hypersomnia [Psychomotor Retardation] : no psychomotor retardation [Anorexia] : no anorexia [Euphoria] : no euphoria [Highly Irritable] : no high irritability [Increased Activity] : no increased in activity [Distractibility] : not distracted [Talkativeness] : no talkativeness [Grandeur] : no feelings of grandeur [Buying Sprees] : no buying sprees [Hypersexuality] : denied hypersexuality [Dec Need For Sleep] : no decreased need for sleep [Delusions] : no ~T delusions [Hallucination Visual] : no visual hallucinations [Hallucination Auditory] : no auditory hallucinations [Hallucination Tactile] : no tactile hallucinations [Thought Disorder] : ~T a thought disorder was not noted [Excessive Worry] : no excessive worries [Ruminations] : no rumination disorder [Re-experiencing] : no re-experiencing [Restlessness] : no restlessness [Hypochondriasis] : no hypochondriasis [Panic] : no panic disorder [de-identified] : concerning medical problems, is seeing providers

## 2023-03-17 NOTE — PHYSICAL EXAM
[None] : none [Normal] : good [Fair] : fair [Loud] : not loud [Dysphoric] : not dysphoric [Labile] : not labile [FreeTextEntry8] : "good" [FreeTextEntry9] : labile

## 2023-03-17 NOTE — HISTORY OF PRESENT ILLNESS
[No] : no [FreeTextEntry1] : This is a 63 year old patient who presents for medication management.  The patient reports fairly stable mood, good, sleep and appetite.  The patient denies any symptoms of depression, soco, or psychosis at this time.  The patient relays less anxiety that impairs their daily functioning. The patient denies any suicidal ideation, homicidal ideation, no auditory or visual hallucinations, or paranoid ideation.  The patient has chronic medical complaints. The patient denies any drug or alcohol use, and is not smoking at this time. I requested the patient's updated physical and labs from their PCP.  Coping skills were discussed and a safety plan was provided.  The patient was educated on the risks, benefits, and alternatives of their psychiatric medications.  The patient will not engage in psychotherapy at this time.  The patient consents to ongoing medication management.  Patient reports being at baseline otherwise, support provided.  \par \par Patient has been seeing medical specialists, doing better.\par \par

## 2023-03-17 NOTE — PLAN
[FreeTextEntry4] : mood is ok\par \par anxiety is chronic, low\par \par ongoing health issues, sees specialists\par \par patient seen monthly for ongoing medication management, has a chronic condition, goal to remain symptom free or maintain functioning in spite of depression/anxiety symptoms, she require ongoing treatment

## 2023-03-18 DIAGNOSIS — F41.1 GENERALIZED ANXIETY DISORDER: ICD-10-CM

## 2023-03-18 DIAGNOSIS — F32.9 MAJOR DEPRESSIVE DISORDER, SINGLE EPISODE, UNSPECIFIED: ICD-10-CM

## 2023-03-18 DIAGNOSIS — F42.9 OBSESSIVE-COMPULSIVE DISORDER, UNSPECIFIED: ICD-10-CM

## 2023-04-28 ENCOUNTER — APPOINTMENT (OUTPATIENT)
Dept: PSYCHIATRY | Facility: CLINIC | Age: 63
End: 2023-04-28

## 2023-04-28 NOTE — REASON FOR VISIT
[FreeTextEntry1] : Patient moved to [place], provided 1 month of RX, will call to resume treatment if moves back to [place] in next 12 months

## 2023-06-02 ENCOUNTER — OUTPATIENT (OUTPATIENT)
Dept: OUTPATIENT SERVICES | Facility: HOSPITAL | Age: 63
LOS: 1 days | End: 2023-06-02

## 2023-06-02 ENCOUNTER — APPOINTMENT (OUTPATIENT)
Dept: PSYCHIATRY | Facility: CLINIC | Age: 63
End: 2023-06-02

## 2023-06-02 ENCOUNTER — NON-APPOINTMENT (OUTPATIENT)
Age: 63
End: 2023-06-02

## 2023-06-02 DIAGNOSIS — Z98.890 OTHER SPECIFIED POSTPROCEDURAL STATES: Chronic | ICD-10-CM

## 2023-06-02 DIAGNOSIS — F42.9 OBSESSIVE-COMPULSIVE DISORDER, UNSPECIFIED: ICD-10-CM

## 2023-06-02 DIAGNOSIS — K80.70 CALCULUS OF GALLBLADDER AND BILE DUCT WITHOUT CHOLECYSTITIS WITHOUT OBSTRUCTION: Chronic | ICD-10-CM

## 2023-06-03 DIAGNOSIS — F42.9 OBSESSIVE-COMPULSIVE DISORDER, UNSPECIFIED: ICD-10-CM

## 2023-06-14 ENCOUNTER — OUTPATIENT (OUTPATIENT)
Dept: OUTPATIENT SERVICES | Facility: HOSPITAL | Age: 63
LOS: 1 days | End: 2023-06-14
Payer: COMMERCIAL

## 2023-06-14 ENCOUNTER — APPOINTMENT (OUTPATIENT)
Dept: PSYCHIATRY | Facility: CLINIC | Age: 63
End: 2023-06-14
Payer: COMMERCIAL

## 2023-06-14 DIAGNOSIS — Z98.890 OTHER SPECIFIED POSTPROCEDURAL STATES: Chronic | ICD-10-CM

## 2023-06-14 DIAGNOSIS — K80.70 CALCULUS OF GALLBLADDER AND BILE DUCT WITHOUT CHOLECYSTITIS WITHOUT OBSTRUCTION: Chronic | ICD-10-CM

## 2023-06-14 DIAGNOSIS — F41.1 GENERALIZED ANXIETY DISORDER: ICD-10-CM

## 2023-06-14 PROCEDURE — 99214 OFFICE O/P EST MOD 30 MIN: CPT | Mod: 95

## 2023-06-14 RX ORDER — MIRTAZAPINE 15 MG/1
15 TABLET, FILM COATED ORAL
Qty: 30 | Refills: 1 | Status: DISCONTINUED | COMMUNITY
End: 2023-06-14

## 2023-06-14 RX ORDER — ESCITALOPRAM OXALATE 20 MG/1
20 TABLET ORAL
Qty: 30 | Refills: 1 | Status: DISCONTINUED | COMMUNITY
End: 2023-06-14

## 2023-06-14 NOTE — CURRENT PSYCHIATRIC SYMPTOMS
[Depressed Mood] : no depressed mood [Anhedonia] : no anhedonia [Guilt] : not feeling guilty [Decreased Concentration] : no decrease in concentrating ability [Hyperphagia] : no hyperphagia [Insomnia] : no insomnia disorder [Hypersomnia] : no ~T hypersomnia [Psychomotor Retardation] : no psychomotor retardation [Anorexia] : no anorexia [Euphoria] : no euphoria [Highly Irritable] : no high irritability [Increased Activity] : no increased in activity [Distractibility] : not distracted [Talkativeness] : no talkativeness [Grandeur] : no feelings of grandeur [Buying Sprees] : no buying sprees [Hypersexuality] : denied hypersexuality [Dec Need For Sleep] : no decreased need for sleep [Delusions] : no ~T delusions [Hallucination Visual] : no visual hallucinations [Hallucination Auditory] : no auditory hallucinations [Hallucination Tactile] : no tactile hallucinations [Thought Disorder] : ~T a thought disorder was not noted [Excessive Worry] : no excessive worries [Ruminations] : no rumination disorder [Obsessions] : obsessions [Re-experiencing] : no re-experiencing [Restlessness] : no restlessness [Hypochondriasis] : no hypochondriasis [Panic] : no panic disorder [de-identified] : concerning medical problems, is seeing providers

## 2023-06-14 NOTE — HISTORY OF PRESENT ILLNESS
[FreeTextEntry1] : This is a 63 year old patient who presents for medication management.  The patient reports fairly stable mood, good, sleep and appetite.  The patient denies any symptoms of depression, soco, or psychosis at this time.  The patient relays more anxiety that impairs their daily functioning. The patient denies any suicidal ideation, homicidal ideation, no auditory or visual hallucinations, or paranoid ideation.  The patient has chronic medical complaints. The patient denies any drug or alcohol use, and is not smoking at this time. I requested the patient's updated physical and labs from their PCP.  Coping skills were discussed and a safety plan was provided.  The patient was educated on the risks, benefits, and alternatives of their psychiatric medications.  The patient will not engage in psychotherapy at this time.  The patient consents to ongoing medication management.  Patient reports being at baseline otherwise, support provided.  \par \par Patient has been seeing medical specialists, had labs and thyroid scan today.\par \par Risks, benefits discussed, see plan.\par \par  [No] : no

## 2023-06-14 NOTE — DISCUSSION/SUMMARY
[Date of Last Physical Exam: _____] : Date of Last Physical Exam: [unfilled] [Date of Last Annual Labs: _____] : Date of Last Annual Labs: [unfilled] [Annual Review of Systems Completed?] : Annual Review of Systems Completed: Yes [Tobacco Screening Completed?] : Tobacco Screening Completed: Yes [Date of Last HbgA1c: _____] : Date of Last HbgA1c: [unfilled] [Date of Last Lipid Profile: _____] : Date of Last Lipid Profile: [unfilled] [Potential impact of patient’s physical health conditions on psychiatric care?] : Potential impact of patient’s physical health conditions on psychiatric care: Yes [Does patient require any additional health services or referrals?] : Does patient require any additional health services or referrals: No [Able to manage surrounding demands and opportunities] : able to manage surrounding demands and opportunities [Able to exercise self-direction] : able to exercise self-direction [Able to set and pursue goals] : able to set and pursue goals [Adherent to treatment recommendations] : adherent to treatment recommendations [Articulate] : articulate [Attempting to realize their potential] : Attempting to realize their potential [Awareness of substance use issues] : awareness of substance use issues [Cognitively intact] : cognitively intact [Good impulse control] : good impulse control [Insightful] : insightful [Creative] : creative [Intelligent] : intelligent [Motivated to participate in treatment] : motivated to participate in treatment [Motivated and ready for change] : motivated and ready for change [Health literacy] : health literacy [Motivated to maintain or improve physical health] : motivated to maintain or improve physical health [In good health] : in good health [Housing stability] : housing stability [English fluency] : English fluency [Connected to healthcare] : connected to healthcare [Access to safe outdoor spaces] : access to safe outdoor spaces [Social supports] : social supports [FreeTextEntry2] : 10/15/23 [FreeTextEntry3] : see chart [FreeTextEntry5] : stable mood, free of anxiety, see progress notes plans for updates each visit [Mental Health] : Mental Health [Physical Health] : Physical Health [Other rationale for transition/discharge:] : Other rationale for transition/discharge: [No - Explain] : No [None - Reason others did not participate:] : None - Reason others did not participate:  [Psychiatric Provider/Prescriber] : Psychiatric Provider/Prescriber [FreeTextEntry1] : see above [de-identified] : chronic condition

## 2023-06-14 NOTE — PLAN
[FreeTextEntry4] : mood is ok\par \par anxiety is chronic, increased\par \par ongoing health issues, sees specialists\par \par patient seen monthly for ongoing medication management, has a chronic condition, goal to remain symptom free or maintain functioning in spite of depression/anxiety symptoms, she require ongoing treatment

## 2023-06-14 NOTE — REASON FOR VISIT
[Telephone (audio) - Individual/Group] : This telephonic visit was provided via audio only technology. [Technical or other issues] : Patient unable to effectively utilize tele-video due to technical or other issues. [Medical Office: ([facility])___] : The provider was located at the medical office in [unfilled]. [Home] : The patient, [unfilled], was located at home, [unfilled], at the time of the visit. [FreeTextEntry4] : 1050am [FreeTextEntry5] : [id number] [FreeTextEntry2] : ISTOP checked [FreeTextEntry3] : will come in person next time [FreeTextEntry1] : "I am struggling with thyroid issue, would like to resume medication, I ran out in [place]." [Verbal consent obtained from patient] : the patient, [unfilled]

## 2023-06-14 NOTE — REASON FOR VISIT
[Telephone (audio) - Individual/Group] : This telephonic visit was provided via audio only technology. [Technical or other issues] : Patient unable to effectively utilize tele-video due to technical or other issues. [Medical Office: ([facility])___] : The provider was located at the medical office in [unfilled]. [Home] : The patient, [unfilled], was located at home, [unfilled], at the time of the visit. [FreeTextEntry4] : 1050am [FreeTextEntry5] : [id number] [FreeTextEntry2] : ISTOP checked [FreeTextEntry3] : will come in person next time [FreeTextEntry1] : "I am struggling with thyroid issue, would like to resume medication, I ran out in [place]." [Verbal consent obtained from patient] : the patient, [unfilled]

## 2023-06-14 NOTE — CURRENT PSYCHIATRIC SYMPTOMS
[Depressed Mood] : no depressed mood [Anhedonia] : no anhedonia [Guilt] : not feeling guilty [Decreased Concentration] : no decrease in concentrating ability [Hyperphagia] : no hyperphagia [Insomnia] : no insomnia disorder [Hypersomnia] : no ~T hypersomnia [Psychomotor Retardation] : no psychomotor retardation [Anorexia] : no anorexia [Euphoria] : no euphoria [Highly Irritable] : no high irritability [Increased Activity] : no increased in activity [Distractibility] : not distracted [Talkativeness] : no talkativeness [Grandeur] : no feelings of grandeur [Buying Sprees] : no buying sprees [Hypersexuality] : denied hypersexuality [Dec Need For Sleep] : no decreased need for sleep [Delusions] : no ~T delusions [Hallucination Visual] : no visual hallucinations [Hallucination Auditory] : no auditory hallucinations [Hallucination Tactile] : no tactile hallucinations [Thought Disorder] : ~T a thought disorder was not noted [Excessive Worry] : no excessive worries [Ruminations] : no rumination disorder [Obsessions] : obsessions [Re-experiencing] : no re-experiencing [Restlessness] : no restlessness [Hypochondriasis] : no hypochondriasis [Panic] : no panic disorder [de-identified] : concerning medical problems, is seeing providers
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
## 2023-06-14 NOTE — DISCUSSION/SUMMARY
[Date of Last Physical Exam: _____] : Date of Last Physical Exam: [unfilled] [Date of Last Annual Labs: _____] : Date of Last Annual Labs: [unfilled] [Annual Review of Systems Completed?] : Annual Review of Systems Completed: Yes [Tobacco Screening Completed?] : Tobacco Screening Completed: Yes [Date of Last HbgA1c: _____] : Date of Last HbgA1c: [unfilled] [Date of Last Lipid Profile: _____] : Date of Last Lipid Profile: [unfilled] [Potential impact of patient’s physical health conditions on psychiatric care?] : Potential impact of patient’s physical health conditions on psychiatric care: Yes [Does patient require any additional health services or referrals?] : Does patient require any additional health services or referrals: No [Able to manage surrounding demands and opportunities] : able to manage surrounding demands and opportunities [Able to exercise self-direction] : able to exercise self-direction [Able to set and pursue goals] : able to set and pursue goals [Adherent to treatment recommendations] : adherent to treatment recommendations [Articulate] : articulate [Attempting to realize their potential] : Attempting to realize their potential [Awareness of substance use issues] : awareness of substance use issues [Cognitively intact] : cognitively intact [Good impulse control] : good impulse control [Insightful] : insightful [Creative] : creative [Intelligent] : intelligent [Motivated to participate in treatment] : motivated to participate in treatment [Motivated and ready for change] : motivated and ready for change [Health literacy] : health literacy [Motivated to maintain or improve physical health] : motivated to maintain or improve physical health [In good health] : in good health [Housing stability] : housing stability [English fluency] : English fluency [Connected to healthcare] : connected to healthcare [Access to safe outdoor spaces] : access to safe outdoor spaces [Social supports] : social supports [FreeTextEntry2] : 10/15/23 [FreeTextEntry3] : see chart [FreeTextEntry5] : stable mood, free of anxiety, see progress notes plans for updates each visit [Mental Health] : Mental Health [Physical Health] : Physical Health [Other rationale for transition/discharge:] : Other rationale for transition/discharge: [No - Explain] : No [None - Reason others did not participate:] : None - Reason others did not participate:  [Psychiatric Provider/Prescriber] : Psychiatric Provider/Prescriber [FreeTextEntry1] : see above [de-identified] : chronic condition

## 2023-06-14 NOTE — ASSESSMENT
[FreeTextEntry1] : Patient is fairly stable, but issue with pain management, at baseline.\par \par Current medications\par 1.start paxil 10mg at bedtime\par 2. resume klonopin 0.5mg po BID\par \par \par follow up in 3-4 weeks\par

## 2023-06-15 DIAGNOSIS — F41.1 GENERALIZED ANXIETY DISORDER: ICD-10-CM

## 2023-07-05 ENCOUNTER — APPOINTMENT (OUTPATIENT)
Dept: PSYCHIATRY | Facility: CLINIC | Age: 63
End: 2023-07-05
Payer: COMMERCIAL

## 2023-07-05 ENCOUNTER — APPOINTMENT (OUTPATIENT)
Dept: PSYCHIATRY | Facility: CLINIC | Age: 63
End: 2023-07-05

## 2023-07-05 ENCOUNTER — OUTPATIENT (OUTPATIENT)
Dept: OUTPATIENT SERVICES | Facility: HOSPITAL | Age: 63
LOS: 1 days | End: 2023-07-05
Payer: COMMERCIAL

## 2023-07-05 DIAGNOSIS — K80.70 CALCULUS OF GALLBLADDER AND BILE DUCT WITHOUT CHOLECYSTITIS WITHOUT OBSTRUCTION: Chronic | ICD-10-CM

## 2023-07-05 DIAGNOSIS — Z98.890 OTHER SPECIFIED POSTPROCEDURAL STATES: Chronic | ICD-10-CM

## 2023-07-05 DIAGNOSIS — F41.1 GENERALIZED ANXIETY DISORDER: ICD-10-CM

## 2023-07-05 PROCEDURE — 99214 OFFICE O/P EST MOD 30 MIN: CPT | Mod: 95

## 2023-07-05 RX ORDER — PAROXETINE HYDROCHLORIDE 10 MG/1
10 TABLET, FILM COATED ORAL AT BEDTIME
Qty: 30 | Refills: 1 | Status: DISCONTINUED | COMMUNITY
Start: 2023-06-14 | End: 2023-07-05

## 2023-07-05 NOTE — DISCUSSION/SUMMARY
[Date of Last Physical Exam: _____] : Date of Last Physical Exam: [unfilled] [Date of Last Annual Labs: _____] : Date of Last Annual Labs: [unfilled] [Annual Review of Systems Completed?] : Annual Review of Systems Completed: Yes [Tobacco Screening Completed?] : Tobacco Screening Completed: Yes [Date of Last HbgA1c: _____] : Date of Last HbgA1c: [unfilled] [Date of Last Lipid Profile: _____] : Date of Last Lipid Profile: [unfilled] [Potential impact of patient’s physical health conditions on psychiatric care?] : Potential impact of patient’s physical health conditions on psychiatric care: Yes [Does patient require any additional health services or referrals?] : Does patient require any additional health services or referrals: No

## 2023-07-05 NOTE — CURRENT PSYCHIATRIC SYMPTOMS
[Depressed Mood] : no depressed mood [Anhedonia] : no anhedonia [Guilt] : not feeling guilty [Decreased Concentration] : no decrease in concentrating ability [Hyperphagia] : no hyperphagia [Insomnia] : no insomnia disorder [Hypersomnia] : no ~T hypersomnia [Psychomotor Retardation] : no psychomotor retardation [Anorexia] : no anorexia [Euphoria] : no euphoria [Highly Irritable] : no high irritability [Increased Activity] : no increased in activity [Distractibility] : not distracted [Talkativeness] : no talkativeness [Grandeur] : no feelings of grandeur [Buying Sprees] : no buying sprees [Hypersexuality] : denied hypersexuality [Dec Need For Sleep] : no decreased need for sleep [Delusions] : no ~T delusions [Hallucination Visual] : no visual hallucinations [Hallucination Auditory] : no auditory hallucinations [Hallucination Tactile] : no tactile hallucinations [Thought Disorder] : ~T a thought disorder was not noted [Excessive Worry] : no excessive worries [Ruminations] : no rumination disorder [Obsessions] : obsessions [Re-experiencing] : no re-experiencing [Restlessness] : no restlessness [Hypochondriasis] : no hypochondriasis [Panic] : no panic disorder [de-identified] : concerning medical problems, is seeing providers

## 2023-07-05 NOTE — ASSESSMENT
[FreeTextEntry1] : Patient is fairly stable, but issue with pain management, at baseline.\par \par Current medications\par 1.d/c paxil 10mg at bedtime  and start remeron 15mg at bedtime\par 2. resume klonopin 0.5mg po BID\par \par \par follow up in 4-5 weeks\par

## 2023-07-05 NOTE — REASON FOR VISIT
[Telephone (audio) - Individual/Group] : This telephonic visit was provided via audio only technology. [Technical or other issues] : Patient unable to effectively utilize tele-video due to technical or other issues. [Medical Office: ([facility])___] : The provider was located at the medical office in [unfilled]. [Home] : The patient, [unfilled], was located at home, [unfilled], at the time of the visit. [FreeTextEntry4] : [id number] [FreeTextEntry5] : 106pm [FreeTextEntry2] : ISTOP checked [FreeTextEntry3] : will come in person next time [FreeTextEntry1] : "I had a fall, don't like paxil, would like to resume remeron." [Verbal consent obtained from patient] : the patient, [unfilled]

## 2023-07-05 NOTE — HISTORY OF PRESENT ILLNESS
[FreeTextEntry1] : This is a 63 year old patient who presents for medication management.  The patient reports fairly stable mood, good, sleep and appetite.  The patient denies any symptoms of depression, soco, or psychosis at this time.  The patient relays more anxiety that impairs their daily functioning. The patient denies any suicidal ideation, homicidal ideation, no auditory or visual hallucinations, or paranoid ideation.  The patient has chronic medical complaints. The patient denies any drug or alcohol use, and is not smoking at this time. I requested the patient's updated physical and labs from their PCP.  Coping skills were discussed and a safety plan was provided.  The patient was educated on the risks, benefits, and alternatives of their psychiatric medications.  The patient will not engage in psychotherapy at this time.  The patient consents to ongoing medication management.  Patient reports being at baseline otherwise, support provided.  \par \par Patient has been seeing medical specialists.\par Risks, benefits discussed, see plan.\par \par  [No] : no

## 2023-07-06 DIAGNOSIS — F41.1 GENERALIZED ANXIETY DISORDER: ICD-10-CM

## 2023-07-13 NOTE — ED ADULT NURSE NOTE - IS THE PATIENT ABLE TO BE SCREENED?
PRE-SEDATION ASSESSMENT    CONSENT  Risks, benefits, and alternatives have been discussed with the patient/patient representative, and patient/patient representative agrees to proceed: Yes    MEDICAL HISTORY  Significant medical/surgical history: Yes  Past Complications with Sedation/Anesthesia: No  Significant Family History: Yes  Smoking History: No  Alcohol/Drug abuse: No  Possible Pregnancy (LMP): No  Cardiac History: No  Respiratory History: No    PHYSICAL EXAM  History and Physical Reviewed: Patient has valid H&P within 30 days. I have reviewed and there are no changes.  Airway Risk History: No previous complications  Airway Anatomy : Class II  Heart : Normal  Lungs : Normal  LOC/Mental Status : Normal    OTHER FINDINGS  Reviewed current medications and allergies: Yes  Pertinent lab/diagnostic test reviewed: Yes    SEDATION RISK ASSESSMENT  Risk Status ASA: Class II - Normal patient with mild systemic disease  Plan for Sedation: Moderate Sedation  Indications for Procedure/Pre-Procedure Diagnosis and Planned Procedure: Cardiac catheterization  EKG Monitoring: Yes    NARRATIVE FINDINGS     
Yes

## 2023-08-09 ENCOUNTER — APPOINTMENT (OUTPATIENT)
Dept: PSYCHIATRY | Facility: CLINIC | Age: 63
End: 2023-08-09
Payer: COMMERCIAL

## 2023-08-09 ENCOUNTER — APPOINTMENT (OUTPATIENT)
Dept: PSYCHIATRY | Facility: CLINIC | Age: 63
End: 2023-08-09

## 2023-08-09 ENCOUNTER — OUTPATIENT (OUTPATIENT)
Dept: OUTPATIENT SERVICES | Facility: HOSPITAL | Age: 63
LOS: 1 days | End: 2023-08-09
Payer: COMMERCIAL

## 2023-08-09 DIAGNOSIS — F33.1 MAJOR DEPRESSIVE DISORDER, RECURRENT, MODERATE: ICD-10-CM

## 2023-08-09 DIAGNOSIS — F41.1 GENERALIZED ANXIETY DISORDER: ICD-10-CM

## 2023-08-09 DIAGNOSIS — F42.9 OBSESSIVE-COMPULSIVE DISORDER, UNSPECIFIED: ICD-10-CM

## 2023-08-09 DIAGNOSIS — K80.70 CALCULUS OF GALLBLADDER AND BILE DUCT WITHOUT CHOLECYSTITIS WITHOUT OBSTRUCTION: Chronic | ICD-10-CM

## 2023-08-09 DIAGNOSIS — F32.9 MAJOR DEPRESSIVE DISORDER, SINGLE EPISODE, UNSPECIFIED: ICD-10-CM

## 2023-08-09 PROCEDURE — 99214 OFFICE O/P EST MOD 30 MIN: CPT

## 2023-08-09 RX ORDER — LORATADINE 10 MG/1
10 CAPSULE, LIQUID FILLED ORAL
Qty: 30 | Refills: 1 | Status: DISCONTINUED | COMMUNITY
Start: 2021-09-29 | End: 2023-08-09

## 2023-08-09 NOTE — HISTORY OF PRESENT ILLNESS
[No] : no [FreeTextEntry1] : This is a 63 year old patient who presents for medication management.  The patient reports fairly stable mood, good, sleep and appetite.  The patient denies any symptoms of depression, soco, or psychosis at this time.  The patient relays more anxiety that impairs their daily functioning. The patient denies any suicidal ideation, homicidal ideation, no auditory or visual hallucinations, or paranoid ideation.  The patient has chronic medical complaints. The patient denies any drug or alcohol use, and is not smoking at this time. I requested the patient's updated physical and labs from their PCP.  Coping skills were discussed and a safety plan was provided.  The patient was educated on the risks, benefits, and alternatives of their psychiatric medications.  The patient will not engage in psychotherapy at this time.  The patient consents to ongoing medication management.  Patient reports being at baseline otherwise, support provided.    Patient has been seeing medical specialists. Risks, benefits discussed, see plan.

## 2023-08-09 NOTE — CURRENT PSYCHIATRIC SYMPTOMS
[Obsessions] : obsessions [Depressed Mood] : no depressed mood [Anhedonia] : no anhedonia [Guilt] : not feeling guilty [Decreased Concentration] : no decrease in concentrating ability [Hyperphagia] : no hyperphagia [Insomnia] : no insomnia disorder [Hypersomnia] : no ~T hypersomnia [Psychomotor Retardation] : no psychomotor retardation [Anorexia] : no anorexia [Euphoria] : no euphoria [Highly Irritable] : no high irritability [Increased Activity] : no increased in activity [Distractibility] : not distracted [Talkativeness] : no talkativeness [Grandeur] : no feelings of grandeur [Buying Sprees] : no buying sprees [Hypersexuality] : denied hypersexuality [Dec Need For Sleep] : no decreased need for sleep [Delusions] : no ~T delusions [Hallucination Visual] : no visual hallucinations [Hallucination Auditory] : no auditory hallucinations [Hallucination Tactile] : no tactile hallucinations [Thought Disorder] : ~T a thought disorder was not noted [Excessive Worry] : no excessive worries [Ruminations] : no rumination disorder [Re-experiencing] : no re-experiencing [Restlessness] : no restlessness [Hypochondriasis] : no hypochondriasis [Panic] : no panic disorder [de-identified] : concerning medical problems, is seeing providers

## 2023-08-09 NOTE — REASON FOR VISIT
[Verbal consent obtained from patient] : the patient, [unfilled] [FreeTextEntry2] : 8/23, ISTOP checked [FreeTextEntry1] : "I would like a mild sleep aid."

## 2023-08-09 NOTE — ASSESSMENT
[FreeTextEntry1] : Patient is fairly stable, but issue with pain management, at baseline.  Current medications 1.remeron 15mg at bedtime 2. klonopin 0.5mg po BID 3. add hydroxyzine 25mg-75mg at bedtime prn (will no longer use loratadine)   follow up in 4 weeks

## 2023-08-10 DIAGNOSIS — F41.1 GENERALIZED ANXIETY DISORDER: ICD-10-CM

## 2023-08-10 DIAGNOSIS — F32.9 MAJOR DEPRESSIVE DISORDER, SINGLE EPISODE, UNSPECIFIED: ICD-10-CM

## 2023-08-10 DIAGNOSIS — F42.9 OBSESSIVE-COMPULSIVE DISORDER, UNSPECIFIED: ICD-10-CM

## 2023-09-06 ENCOUNTER — APPOINTMENT (OUTPATIENT)
Dept: PSYCHIATRY | Facility: CLINIC | Age: 63
End: 2023-09-06

## 2023-09-06 ENCOUNTER — OUTPATIENT (OUTPATIENT)
Dept: OUTPATIENT SERVICES | Facility: HOSPITAL | Age: 63
LOS: 1 days | End: 2023-09-06
Payer: COMMERCIAL

## 2023-09-06 ENCOUNTER — APPOINTMENT (OUTPATIENT)
Dept: PSYCHIATRY | Facility: CLINIC | Age: 63
End: 2023-09-06
Payer: COMMERCIAL

## 2023-09-06 DIAGNOSIS — Z98.890 OTHER SPECIFIED POSTPROCEDURAL STATES: Chronic | ICD-10-CM

## 2023-09-06 DIAGNOSIS — K80.70 CALCULUS OF GALLBLADDER AND BILE DUCT WITHOUT CHOLECYSTITIS WITHOUT OBSTRUCTION: Chronic | ICD-10-CM

## 2023-09-06 DIAGNOSIS — F33.1 MAJOR DEPRESSIVE DISORDER, RECURRENT, MODERATE: ICD-10-CM

## 2023-09-06 PROCEDURE — 99214 OFFICE O/P EST MOD 30 MIN: CPT | Mod: 95

## 2023-09-06 NOTE — CURRENT PSYCHIATRIC SYMPTOMS
[Obsessions] : obsessions [Depressed Mood] : no depressed mood [Anhedonia] : no anhedonia [Guilt] : not feeling guilty [Decreased Concentration] : no decrease in concentrating ability [Hyperphagia] : no hyperphagia [Insomnia] : no insomnia disorder [Hypersomnia] : no ~T hypersomnia [Psychomotor Retardation] : no psychomotor retardation [Anorexia] : no anorexia [Euphoria] : no euphoria [Highly Irritable] : no high irritability [Increased Activity] : no increased in activity [Distractibility] : not distracted [Talkativeness] : no talkativeness [Grandeur] : no feelings of grandeur [Buying Sprees] : no buying sprees [Hypersexuality] : denied hypersexuality [Dec Need For Sleep] : no decreased need for sleep [Delusions] : no ~T delusions [Hallucination Visual] : no visual hallucinations [Hallucination Auditory] : no auditory hallucinations [Hallucination Tactile] : no tactile hallucinations [Thought Disorder] : ~T a thought disorder was not noted [Excessive Worry] : no excessive worries [Ruminations] : no rumination disorder [Re-experiencing] : no re-experiencing [Restlessness] : no restlessness [Hypochondriasis] : no hypochondriasis [Panic] : no panic disorder [de-identified] : concerning medical problems, is seeing providers

## 2023-09-06 NOTE — ASSESSMENT
[FreeTextEntry1] : Patient relays increased anxiety related to medical co-morbidities.   Current medications 1.remeron 15mg at bedtime, unwilling to increase or try alternative SSRI/SNRI 2. klonopin 0.5mg po BID 3. hydroxyzine 25mg-75mg at bedtime prn (will no longer use loratadine)   follow up in 4 weeks

## 2023-09-06 NOTE — PHYSICAL EXAM
[None] : none [Normal] : good [Fair] : fair [Loud] : not loud [Anxious] : anxious [Irritable] : irritability [Labile] : labile [FreeTextEntry8] : anxious

## 2023-09-06 NOTE — REASON FOR VISIT
[Telehealth (audio & video) - Individual/Group] : This visit was provided via telehealth using real-time 2-way audio visual technology. [Medical Office: ([facility])___] : The provider was located at the medical office in [unfilled]. [Home] : The patient, [unfilled], was located at home, [unfilled], at the time of the visit. [Verbal consent obtained from patient] : the patient, [unfilled] [FreeTextEntry4] : 121pm [FreeTextEntry5] : 1230pm [FreeTextEntry2] : 8/23, ISTOP checked [FreeTextEntry1] : "I want more klonopin and nothing else or I will find a new doctor."

## 2023-09-06 NOTE — HISTORY OF PRESENT ILLNESS
[No] : no [FreeTextEntry1] : This is a 63 year old patient who presents for medication management.  The patient reports fairly stable mood, good, sleep and appetite.  The patient denies any symptoms of depression, soco, or psychosis at this time.  The patient relays more anxiety that impairs their daily functioning. The patient denies any suicidal ideation, homicidal ideation, no auditory or visual hallucinations, or paranoid ideation.  The patient has chronic medical complaints. The patient denies any drug or alcohol use, and is not smoking at this time. I requested the patient's updated physical and labs from their PCP.  Coping skills were discussed and a safety plan was provided.  The patient was educated on the risks, benefits, and alternatives of their psychiatric medications.  The patient will not engage in psychotherapy at this time.  The patient consents to ongoing medication management.  Patient reports being at baseline otherwise, support provided.  Educated that SSRI, SNRI, etc. will help with anxiety and that remeron 15mg would be more helpfule for anxiety at a higher dose.  the patient did not want any changes but more klonopin.  Patient was encouraged to seek a second opinion, otherwise will f/u in 1 month.   Patient has been seeing medical specialists. Risks, benefits discussed, see plan.

## 2023-09-07 ENCOUNTER — APPOINTMENT (OUTPATIENT)
Dept: OBGYN | Facility: CLINIC | Age: 63
End: 2023-09-07
Payer: MEDICAID

## 2023-09-07 ENCOUNTER — OUTPATIENT (OUTPATIENT)
Dept: OUTPATIENT SERVICES | Facility: HOSPITAL | Age: 63
LOS: 1 days | End: 2023-09-07
Payer: MEDICAID

## 2023-09-07 VITALS
SYSTOLIC BLOOD PRESSURE: 111 MMHG | DIASTOLIC BLOOD PRESSURE: 59 MMHG | BODY MASS INDEX: 22.94 KG/M2 | HEIGHT: 64 IN | WEIGHT: 134.38 LBS

## 2023-09-07 DIAGNOSIS — K80.70 CALCULUS OF GALLBLADDER AND BILE DUCT WITHOUT CHOLECYSTITIS WITHOUT OBSTRUCTION: Chronic | ICD-10-CM

## 2023-09-07 DIAGNOSIS — Z01.419 ENCOUNTER FOR GYNECOLOGICAL EXAMINATION (GENERAL) (ROUTINE) W/OUT ABNORMAL FINDINGS: ICD-10-CM

## 2023-09-07 DIAGNOSIS — Z98.890 OTHER SPECIFIED POSTPROCEDURAL STATES: Chronic | ICD-10-CM

## 2023-09-07 DIAGNOSIS — F33.1 MAJOR DEPRESSIVE DISORDER, RECURRENT, MODERATE: ICD-10-CM

## 2023-09-07 DIAGNOSIS — Z01.419 ENCOUNTER FOR GYNECOLOGICAL EXAMINATION (GENERAL) (ROUTINE) WITHOUT ABNORMAL FINDINGS: ICD-10-CM

## 2023-09-07 PROCEDURE — 99386 PREV VISIT NEW AGE 40-64: CPT

## 2023-09-07 PROCEDURE — 99213 OFFICE O/P EST LOW 20 MIN: CPT | Mod: 25

## 2023-09-07 PROCEDURE — 88142 CYTOPATH C/V THIN LAYER: CPT

## 2023-09-07 PROCEDURE — 99396 PREV VISIT EST AGE 40-64: CPT

## 2023-09-07 PROCEDURE — 87624 HPV HI-RISK TYP POOLED RSLT: CPT

## 2023-09-07 PROCEDURE — 87086 URINE CULTURE/COLONY COUNT: CPT

## 2023-09-07 NOTE — HISTORY OF PRESENT ILLNESS
[FreeTextEntry1] : 62yo  for annual exam w/ c/o breast pain, breast implants are in place x30 years, reports feeling sharp, sudden pains. Many neurological complaints in arm, legs, difficulty walking, neurology apt tomorrow reports episodes of leakage of urine, urge incontinence and fecal incontinence   PMH: hypothyroid, anxiety, depression, GERD PSH: breast implants, shoulder surgery, foot surgery, hand surgery Social: no drinking, h/o 20 years cigareets, quit 7 years ago, occasional cigarette now, 2 sons , daughter estranged, spinal fusion Meds: klonapin LMP 52 years old at time of endometrial ablation.

## 2023-09-07 NOTE — PHYSICAL EXAM
[Appropriately responsive] : appropriately responsive [FreeTextEntry6] : irregular breasts with implants, no tenderness or masses bilaterally

## 2023-09-07 NOTE — DISCUSSION/SUMMARY
[FreeTextEntry1] : 62yo, postmenopausal, annual exam, breast pain -pap/hpv sent -breast imaging -referral for breast surgery -referral for urogyn -f/u in 1 yr or sooner prn

## 2023-09-08 ENCOUNTER — OUTPATIENT (OUTPATIENT)
Dept: OUTPATIENT SERVICES | Facility: HOSPITAL | Age: 63
LOS: 1 days | End: 2023-09-08

## 2023-09-08 ENCOUNTER — APPOINTMENT (OUTPATIENT)
Dept: VASCULAR SURGERY | Facility: CLINIC | Age: 63
End: 2023-09-08
Payer: MEDICAID

## 2023-09-08 DIAGNOSIS — K80.70 CALCULUS OF GALLBLADDER AND BILE DUCT WITHOUT CHOLECYSTITIS WITHOUT OBSTRUCTION: Chronic | ICD-10-CM

## 2023-09-08 DIAGNOSIS — Z01.419 ENCOUNTER FOR GYNECOLOGICAL EXAMINATION (GENERAL) (ROUTINE) WITHOUT ABNORMAL FINDINGS: ICD-10-CM

## 2023-09-08 DIAGNOSIS — N64.4 MASTODYNIA: ICD-10-CM

## 2023-09-08 DIAGNOSIS — Z98.890 OTHER SPECIFIED POSTPROCEDURAL STATES: Chronic | ICD-10-CM

## 2023-09-08 PROCEDURE — 99203 OFFICE O/P NEW LOW 30 MIN: CPT

## 2023-09-08 NOTE — PHYSICAL EXAM
[JVD] : no jugular venous distention  [2+] : right 2+ [Ankle Swelling (On Exam)] : not present [Varicose Veins Of Lower Extremities] : not present [] : not present

## 2023-09-08 NOTE — ASSESSMENT
[FreeTextEntry1] : The patient presents with complaints of pain in the  bilateral leg. The pain occurs in the groin and hip area and occurs with 1 block of walking distance. The pain has been ongoing for several months and getting worse but subsides with rest and restarts with walking. she has severe degenerative disc disease in lumbar area  The patient denies ulcers or trauma to the leg.  palpable femoral and pedal pulses bilateral  No evidence of arterial insuffency  Follow up with neurology  30 minutes spent with the patient obtaining history, reviewing labs/studies ordering studies, physical exam and discussing plan.

## 2023-09-08 NOTE — HISTORY OF PRESENT ILLNESS
[FreeTextEntry1] : The patient presents with complaints of pain in the  bilateral leg. The pain occurs in the groin and hip area and occurs with 1 block of walking distance. The pain has been ongoing for several months and getting worse but subsides with rest and restarts with walking. she has severe denenerative disc disease in lumbar area  The patient denies ulcers or trauma to the leg.

## 2023-09-09 DIAGNOSIS — N64.4 MASTODYNIA: ICD-10-CM

## 2023-09-11 ENCOUNTER — RX RENEWAL (OUTPATIENT)
Age: 63
End: 2023-09-11

## 2023-09-14 LAB
BACTERIA UR CULT: NORMAL
CYTOLOGY CVX/VAG DOC THIN PREP: ABNORMAL
HPV HIGH+LOW RISK DNA PNL CVX: NOT DETECTED

## 2023-09-26 ENCOUNTER — RESULT REVIEW (OUTPATIENT)
Age: 63
End: 2023-09-26

## 2023-09-26 ENCOUNTER — OUTPATIENT (OUTPATIENT)
Dept: OUTPATIENT SERVICES | Facility: HOSPITAL | Age: 63
LOS: 1 days | End: 2023-09-26
Payer: MEDICAID

## 2023-09-26 DIAGNOSIS — Z12.31 ENCOUNTER FOR SCREENING MAMMOGRAM FOR MALIGNANT NEOPLASM OF BREAST: ICD-10-CM

## 2023-09-26 DIAGNOSIS — Z98.890 OTHER SPECIFIED POSTPROCEDURAL STATES: Chronic | ICD-10-CM

## 2023-09-26 DIAGNOSIS — K80.70 CALCULUS OF GALLBLADDER AND BILE DUCT WITHOUT CHOLECYSTITIS WITHOUT OBSTRUCTION: Chronic | ICD-10-CM

## 2023-09-26 DIAGNOSIS — R92.8 OTHER ABNORMAL AND INCONCLUSIVE FINDINGS ON DIAGNOSTIC IMAGING OF BREAST: ICD-10-CM

## 2023-09-26 PROCEDURE — 76641 ULTRASOUND BREAST COMPLETE: CPT | Mod: 26,50

## 2023-09-26 PROCEDURE — G0279: CPT

## 2023-09-26 PROCEDURE — 77066 DX MAMMO INCL CAD BI: CPT

## 2023-09-26 PROCEDURE — 76641 ULTRASOUND BREAST COMPLETE: CPT | Mod: 50

## 2023-09-26 PROCEDURE — 77066 DX MAMMO INCL CAD BI: CPT | Mod: 26

## 2023-09-26 PROCEDURE — G0279: CPT | Mod: 26

## 2023-09-27 DIAGNOSIS — R92.8 OTHER ABNORMAL AND INCONCLUSIVE FINDINGS ON DIAGNOSTIC IMAGING OF BREAST: ICD-10-CM

## 2023-10-02 ENCOUNTER — APPOINTMENT (OUTPATIENT)
Dept: PLASTIC SURGERY | Facility: CLINIC | Age: 63
End: 2023-10-02
Payer: MEDICAID

## 2023-10-02 VITALS — BODY MASS INDEX: 22.5 KG/M2 | HEIGHT: 66 IN | WEIGHT: 140 LBS

## 2023-10-02 DIAGNOSIS — Z98.82 BREAST IMPLANT STATUS: ICD-10-CM

## 2023-10-02 DIAGNOSIS — R92.1 MAMMOGRAPHIC CALCIFICATION FOUND ON DIAGNOSTIC IMAGING OF BREAST: ICD-10-CM

## 2023-10-02 PROCEDURE — 99203 OFFICE O/P NEW LOW 30 MIN: CPT

## 2023-10-04 ENCOUNTER — NON-APPOINTMENT (OUTPATIENT)
Age: 63
End: 2023-10-04

## 2023-10-04 ENCOUNTER — APPOINTMENT (OUTPATIENT)
Dept: PSYCHIATRY | Facility: CLINIC | Age: 63
End: 2023-10-04

## 2023-10-06 ENCOUNTER — APPOINTMENT (OUTPATIENT)
Dept: PSYCHIATRY | Facility: CLINIC | Age: 63
End: 2023-10-06
Payer: COMMERCIAL

## 2023-10-06 ENCOUNTER — OUTPATIENT (OUTPATIENT)
Dept: OUTPATIENT SERVICES | Facility: HOSPITAL | Age: 63
LOS: 1 days | End: 2023-10-06
Payer: COMMERCIAL

## 2023-10-06 DIAGNOSIS — Z98.890 OTHER SPECIFIED POSTPROCEDURAL STATES: Chronic | ICD-10-CM

## 2023-10-06 DIAGNOSIS — K80.70 CALCULUS OF GALLBLADDER AND BILE DUCT WITHOUT CHOLECYSTITIS WITHOUT OBSTRUCTION: Chronic | ICD-10-CM

## 2023-10-06 DIAGNOSIS — F41.1 GENERALIZED ANXIETY DISORDER: ICD-10-CM

## 2023-10-06 PROCEDURE — 99214 OFFICE O/P EST MOD 30 MIN: CPT | Mod: 95

## 2023-10-07 DIAGNOSIS — F41.1 GENERALIZED ANXIETY DISORDER: ICD-10-CM

## 2023-10-11 ENCOUNTER — APPOINTMENT (OUTPATIENT)
Dept: UROGYNECOLOGY | Facility: CLINIC | Age: 63
End: 2023-10-11
Payer: MEDICAID

## 2023-10-11 ENCOUNTER — LABORATORY RESULT (OUTPATIENT)
Age: 63
End: 2023-10-11

## 2023-10-11 VITALS
DIASTOLIC BLOOD PRESSURE: 58 MMHG | HEART RATE: 83 BPM | WEIGHT: 138 LBS | HEIGHT: 66 IN | BODY MASS INDEX: 22.18 KG/M2 | SYSTOLIC BLOOD PRESSURE: 91 MMHG

## 2023-10-11 DIAGNOSIS — R10.2 PELVIC AND PERINEAL PAIN: ICD-10-CM

## 2023-10-11 DIAGNOSIS — N81.6 RECTOCELE: ICD-10-CM

## 2023-10-11 DIAGNOSIS — R39.14 FEELING OF INCOMPLETE BLADDER EMPTYING: ICD-10-CM

## 2023-10-11 DIAGNOSIS — Z87.891 PERSONAL HISTORY OF NICOTINE DEPENDENCE: ICD-10-CM

## 2023-10-11 DIAGNOSIS — R39.11 HESITANCY OF MICTURITION: ICD-10-CM

## 2023-10-11 DIAGNOSIS — R39.15 URGENCY OF URINATION: ICD-10-CM

## 2023-10-11 DIAGNOSIS — F17.200 NICOTINE DEPENDENCE, UNSPECIFIED, UNCOMPLICATED: ICD-10-CM

## 2023-10-11 DIAGNOSIS — E03.9 HYPOTHYROIDISM, UNSPECIFIED: ICD-10-CM

## 2023-10-11 DIAGNOSIS — R35.1 NOCTURIA: ICD-10-CM

## 2023-10-11 DIAGNOSIS — M54.30 SCIATICA, UNSPECIFIED SIDE: ICD-10-CM

## 2023-10-11 PROCEDURE — 51701 INSERT BLADDER CATHETER: CPT

## 2023-10-11 PROCEDURE — 99215 OFFICE O/P EST HI 40 MIN: CPT | Mod: 25

## 2023-10-11 PROCEDURE — 99205 OFFICE O/P NEW HI 60 MIN: CPT | Mod: 25

## 2023-10-11 RX ORDER — HYDROXYZINE PAMOATE 100 MG
100 CAPSULE ORAL
Refills: 0 | Status: COMPLETED | COMMUNITY
End: 2023-10-11

## 2023-10-11 RX ORDER — HYDROXYZINE PAMOATE 25 MG/1
25 CAPSULE ORAL
Qty: 90 | Refills: 2 | Status: COMPLETED | COMMUNITY
Start: 2023-08-09 | End: 2023-10-11

## 2023-10-11 RX ORDER — MORPHINE SULFATE 15 MG/1
15 TABLET ORAL
Refills: 0 | Status: COMPLETED | COMMUNITY
End: 2023-10-11

## 2023-10-11 RX ORDER — DICLOFENAC EPOLAMINE 0.01 G/1
1.3 SYSTEM TOPICAL
Refills: 0 | Status: COMPLETED | COMMUNITY
End: 2023-10-11

## 2023-10-13 PROBLEM — R10.2 FEMALE PELVIC PAIN: Status: ACTIVE | Noted: 2023-10-13

## 2023-10-13 PROBLEM — R39.11 URINARY HESITANCY: Status: ACTIVE | Noted: 2023-10-13

## 2023-10-13 PROBLEM — R39.14 FEELING OF INCOMPLETE BLADDER EMPTYING: Status: ACTIVE | Noted: 2023-10-13

## 2023-10-13 PROBLEM — R35.1 NOCTURIA: Status: ACTIVE | Noted: 2023-10-13

## 2023-10-13 PROBLEM — N81.6 POSTERIOR VAGINAL WALL PROLAPSE: Status: ACTIVE | Noted: 2023-10-13

## 2023-10-13 LAB
APPEARANCE: CLEAR
BILIRUBIN URINE: NEGATIVE
BLOOD URINE: NEGATIVE
COLOR: YELLOW
GLUCOSE QUALITATIVE U: NEGATIVE
KETONES URINE: NEGATIVE
LEUKOCYTE ESTERASE URINE: NEGATIVE
NITRITE URINE: NEGATIVE
PH URINE: 6
PROTEIN URINE: ABNORMAL
SPECIFIC GRAVITY URINE: >=1.03
UROBILINOGEN URINE: NORMAL

## 2023-10-16 LAB — URINE CULTURE <10: NORMAL

## 2023-10-21 NOTE — ED PROVIDER NOTE - MDM PATIENT STATEMENT FOR ADDL TREATMENT
The Delivery OB Provider certifies that vaginal examination and/or abdominal examination after the delivery was done and no foreign body was found.
Patient with one or more new problems requiring additional work-up/treatment.

## 2023-10-23 ENCOUNTER — APPOINTMENT (OUTPATIENT)
Dept: NEUROLOGY | Facility: CLINIC | Age: 63
End: 2023-10-23

## 2023-10-25 ENCOUNTER — NON-APPOINTMENT (OUTPATIENT)
Age: 63
End: 2023-10-25

## 2023-10-25 ENCOUNTER — APPOINTMENT (OUTPATIENT)
Dept: PSYCHIATRY | Facility: CLINIC | Age: 63
End: 2023-10-25

## 2023-10-30 ENCOUNTER — RESULT REVIEW (OUTPATIENT)
Age: 63
End: 2023-10-30

## 2023-10-30 ENCOUNTER — OUTPATIENT (OUTPATIENT)
Dept: OUTPATIENT SERVICES | Facility: HOSPITAL | Age: 63
LOS: 1 days | End: 2023-10-30
Payer: MEDICAID

## 2023-10-30 DIAGNOSIS — R92.8 OTHER ABNORMAL AND INCONCLUSIVE FINDINGS ON DIAGNOSTIC IMAGING OF BREAST: ICD-10-CM

## 2023-10-30 DIAGNOSIS — Z98.82 BREAST IMPLANT STATUS: ICD-10-CM

## 2023-10-30 DIAGNOSIS — Z98.890 OTHER SPECIFIED POSTPROCEDURAL STATES: Chronic | ICD-10-CM

## 2023-10-30 DIAGNOSIS — Z80.3 FAMILY HISTORY OF MALIGNANT NEOPLASM OF BREAST: ICD-10-CM

## 2023-10-30 DIAGNOSIS — K80.70 CALCULUS OF GALLBLADDER AND BILE DUCT WITHOUT CHOLECYSTITIS WITHOUT OBSTRUCTION: Chronic | ICD-10-CM

## 2023-10-30 PROCEDURE — A9579: CPT

## 2023-10-30 PROCEDURE — 77049 MRI BREAST C-+ W/CAD BI: CPT | Mod: 26

## 2023-10-30 PROCEDURE — 77049 MRI BREAST C-+ W/CAD BI: CPT

## 2023-10-30 PROCEDURE — C8937: CPT

## 2023-10-31 DIAGNOSIS — R92.8 OTHER ABNORMAL AND INCONCLUSIVE FINDINGS ON DIAGNOSTIC IMAGING OF BREAST: ICD-10-CM

## 2023-11-07 ENCOUNTER — OUTPATIENT (OUTPATIENT)
Dept: OUTPATIENT SERVICES | Facility: HOSPITAL | Age: 63
LOS: 1 days | End: 2023-11-07
Payer: MEDICAID

## 2023-11-07 ENCOUNTER — APPOINTMENT (OUTPATIENT)
Dept: PSYCHIATRY | Facility: CLINIC | Age: 63
End: 2023-11-07
Payer: MEDICAID

## 2023-11-07 DIAGNOSIS — F41.1 GENERALIZED ANXIETY DISORDER: ICD-10-CM

## 2023-11-07 DIAGNOSIS — Z98.890 OTHER SPECIFIED POSTPROCEDURAL STATES: Chronic | ICD-10-CM

## 2023-11-07 DIAGNOSIS — K80.70 CALCULUS OF GALLBLADDER AND BILE DUCT WITHOUT CHOLECYSTITIS WITHOUT OBSTRUCTION: Chronic | ICD-10-CM

## 2023-11-07 PROCEDURE — 99214 OFFICE O/P EST MOD 30 MIN: CPT | Mod: 95

## 2023-11-07 RX ORDER — ARIPIPRAZOLE 2 MG/1
2 TABLET ORAL DAILY
Qty: 30 | Refills: 2 | Status: DISCONTINUED | COMMUNITY
Start: 2023-10-06 | End: 2023-11-07

## 2023-11-08 DIAGNOSIS — F41.1 GENERALIZED ANXIETY DISORDER: ICD-10-CM

## 2023-11-13 ENCOUNTER — APPOINTMENT (OUTPATIENT)
Dept: PLASTIC SURGERY | Facility: CLINIC | Age: 63
End: 2023-11-13
Payer: MEDICAID

## 2023-11-13 PROCEDURE — 99213 OFFICE O/P EST LOW 20 MIN: CPT

## 2023-11-21 ENCOUNTER — OUTPATIENT (OUTPATIENT)
Dept: OUTPATIENT SERVICES | Facility: HOSPITAL | Age: 63
LOS: 1 days | End: 2023-11-21
Payer: MEDICAID

## 2023-11-21 ENCOUNTER — APPOINTMENT (OUTPATIENT)
Dept: NEUROLOGY | Facility: CLINIC | Age: 63
End: 2023-11-21
Payer: MEDICAID

## 2023-11-21 VITALS — SYSTOLIC BLOOD PRESSURE: 134 MMHG | DIASTOLIC BLOOD PRESSURE: 81 MMHG | OXYGEN SATURATION: 98 % | HEART RATE: 89 BPM

## 2023-11-21 DIAGNOSIS — F32.A ANXIETY DISORDER, UNSPECIFIED: ICD-10-CM

## 2023-11-21 DIAGNOSIS — Z00.00 ENCOUNTER FOR GENERAL ADULT MEDICAL EXAMINATION WITHOUT ABNORMAL FINDINGS: ICD-10-CM

## 2023-11-21 DIAGNOSIS — M54.32 SCIATICA, RIGHT SIDE: ICD-10-CM

## 2023-11-21 DIAGNOSIS — M54.50 LOW BACK PAIN, UNSPECIFIED: ICD-10-CM

## 2023-11-21 DIAGNOSIS — Z98.890 OTHER SPECIFIED POSTPROCEDURAL STATES: Chronic | ICD-10-CM

## 2023-11-21 DIAGNOSIS — Z86.39 PERSONAL HISTORY OF OTHER ENDOCRINE, NUTRITIONAL AND METABOLIC DISEASE: ICD-10-CM

## 2023-11-21 DIAGNOSIS — M54.31 SCIATICA, RIGHT SIDE: ICD-10-CM

## 2023-11-21 DIAGNOSIS — K80.70 CALCULUS OF GALLBLADDER AND BILE DUCT WITHOUT CHOLECYSTITIS WITHOUT OBSTRUCTION: Chronic | ICD-10-CM

## 2023-11-21 DIAGNOSIS — F41.9 ANXIETY DISORDER, UNSPECIFIED: ICD-10-CM

## 2023-11-21 DIAGNOSIS — Z86.69 PERSONAL HISTORY OF OTHER DISEASES OF THE NERVOUS SYSTEM AND SENSE ORGANS: ICD-10-CM

## 2023-11-21 PROCEDURE — 99204 OFFICE O/P NEW MOD 45 MIN: CPT

## 2023-11-22 DIAGNOSIS — M54.31 SCIATICA, RIGHT SIDE: ICD-10-CM

## 2023-11-22 DIAGNOSIS — F41.9 ANXIETY DISORDER, UNSPECIFIED: ICD-10-CM

## 2023-11-22 DIAGNOSIS — Z86.39 PERSONAL HISTORY OF OTHER ENDOCRINE, NUTRITIONAL AND METABOLIC DISEASE: ICD-10-CM

## 2023-11-22 DIAGNOSIS — Z86.69 PERSONAL HISTORY OF OTHER DISEASES OF THE NERVOUS SYSTEM AND SENSE ORGANS: ICD-10-CM

## 2023-11-29 ENCOUNTER — APPOINTMENT (OUTPATIENT)
Dept: UROGYNECOLOGY | Facility: CLINIC | Age: 63
End: 2023-11-29
Payer: MEDICAID

## 2023-11-29 VITALS
BODY MASS INDEX: 22.18 KG/M2 | SYSTOLIC BLOOD PRESSURE: 124 MMHG | HEIGHT: 66 IN | WEIGHT: 138 LBS | HEART RATE: 67 BPM | DIASTOLIC BLOOD PRESSURE: 81 MMHG

## 2023-11-29 DIAGNOSIS — N95.2 POSTMENOPAUSAL ATROPHIC VAGINITIS: ICD-10-CM

## 2023-11-29 DIAGNOSIS — N39.41 URGE INCONTINENCE: ICD-10-CM

## 2023-11-29 PROCEDURE — 99213 OFFICE O/P EST LOW 20 MIN: CPT

## 2023-11-29 RX ORDER — OXYCODONE AND ACETAMINOPHEN 5; 325 MG/1; MG/1
5-325 TABLET ORAL
Refills: 0 | Status: COMPLETED | COMMUNITY
End: 2023-11-29

## 2023-11-29 RX ORDER — FLUTICASONE PROPIONATE 50 UG/1
50 SPRAY, METERED NASAL
Refills: 0 | Status: COMPLETED | COMMUNITY
End: 2023-11-29

## 2023-12-05 ENCOUNTER — OUTPATIENT (OUTPATIENT)
Dept: OUTPATIENT SERVICES | Facility: HOSPITAL | Age: 63
LOS: 1 days | End: 2023-12-05
Payer: COMMERCIAL

## 2023-12-05 ENCOUNTER — APPOINTMENT (OUTPATIENT)
Dept: PSYCHIATRY | Facility: CLINIC | Age: 63
End: 2023-12-05
Payer: COMMERCIAL

## 2023-12-05 DIAGNOSIS — F42.9 OBSESSIVE-COMPULSIVE DISORDER, UNSPECIFIED: ICD-10-CM

## 2023-12-05 DIAGNOSIS — Z98.890 OTHER SPECIFIED POSTPROCEDURAL STATES: Chronic | ICD-10-CM

## 2023-12-05 DIAGNOSIS — F32.9 MAJOR DEPRESSIVE DISORDER, SINGLE EPISODE, UNSPECIFIED: ICD-10-CM

## 2023-12-05 DIAGNOSIS — F41.1 GENERALIZED ANXIETY DISORDER: ICD-10-CM

## 2023-12-05 DIAGNOSIS — K80.70 CALCULUS OF GALLBLADDER AND BILE DUCT WITHOUT CHOLECYSTITIS WITHOUT OBSTRUCTION: Chronic | ICD-10-CM

## 2023-12-05 PROCEDURE — 99214 OFFICE O/P EST MOD 30 MIN: CPT | Mod: 95

## 2023-12-06 DIAGNOSIS — F41.1 GENERALIZED ANXIETY DISORDER: ICD-10-CM

## 2023-12-06 DIAGNOSIS — F42.9 OBSESSIVE-COMPULSIVE DISORDER, UNSPECIFIED: ICD-10-CM

## 2023-12-06 DIAGNOSIS — F32.9 MAJOR DEPRESSIVE DISORDER, SINGLE EPISODE, UNSPECIFIED: ICD-10-CM

## 2023-12-12 ENCOUNTER — APPOINTMENT (OUTPATIENT)
Dept: RHEUMATOLOGY | Facility: CLINIC | Age: 63
End: 2023-12-12
Payer: MEDICAID

## 2023-12-12 VITALS
HEIGHT: 66 IN | DIASTOLIC BLOOD PRESSURE: 67 MMHG | SYSTOLIC BLOOD PRESSURE: 95 MMHG | TEMPERATURE: 97.8 F | WEIGHT: 123 LBS | BODY MASS INDEX: 19.77 KG/M2 | HEART RATE: 74 BPM | OXYGEN SATURATION: 96 %

## 2023-12-12 DIAGNOSIS — M25.562 PAIN IN RIGHT KNEE: ICD-10-CM

## 2023-12-12 DIAGNOSIS — M25.561 PAIN IN RIGHT KNEE: ICD-10-CM

## 2023-12-12 DIAGNOSIS — G89.29 PAIN IN RIGHT KNEE: ICD-10-CM

## 2023-12-12 PROCEDURE — 99204 OFFICE O/P NEW MOD 45 MIN: CPT

## 2023-12-12 RX ORDER — LEVOTHYROXINE SODIUM 0.03 MG/1
25 TABLET ORAL
Refills: 0 | Status: ACTIVE | COMMUNITY

## 2024-01-02 ENCOUNTER — OUTPATIENT (OUTPATIENT)
Dept: OUTPATIENT SERVICES | Facility: HOSPITAL | Age: 64
LOS: 1 days | End: 2024-01-02
Payer: COMMERCIAL

## 2024-01-02 ENCOUNTER — APPOINTMENT (OUTPATIENT)
Dept: PSYCHIATRY | Facility: CLINIC | Age: 64
End: 2024-01-02
Payer: COMMERCIAL

## 2024-01-02 DIAGNOSIS — F42.9 OBSESSIVE-COMPULSIVE DISORDER, UNSPECIFIED: ICD-10-CM

## 2024-01-02 DIAGNOSIS — F32.9 MAJOR DEPRESSIVE DISORDER, SINGLE EPISODE, UNSPECIFIED: ICD-10-CM

## 2024-01-02 DIAGNOSIS — K80.70 CALCULUS OF GALLBLADDER AND BILE DUCT WITHOUT CHOLECYSTITIS WITHOUT OBSTRUCTION: Chronic | ICD-10-CM

## 2024-01-02 DIAGNOSIS — Z98.890 OTHER SPECIFIED POSTPROCEDURAL STATES: Chronic | ICD-10-CM

## 2024-01-02 DIAGNOSIS — F41.1 GENERALIZED ANXIETY DISORDER: ICD-10-CM

## 2024-01-02 PROCEDURE — 99214 OFFICE O/P EST MOD 30 MIN: CPT | Mod: 95

## 2024-01-02 NOTE — DISCUSSION/SUMMARY
[Date of Last Physical Exam: _____] : Date of Last Physical Exam: [unfilled] [Date of Last Annual Labs: _____] : Date of Last Annual Labs: [unfilled] [Annual Review of Systems Completed?] : Annual Review of Systems Completed: Yes [Tobacco Screening Completed?] : Tobacco Screening Completed: Yes [Date of Last HbgA1c: _____] : Date of Last HbgA1c: [unfilled] [Date of Last Lipid Profile: _____] : Date of Last Lipid Profile: [unfilled] [Potential impact of patient’s physical health conditions on psychiatric care?] : Potential impact of patient's physical health conditions on psychiatric care: Yes [Does patient require any additional health services or referrals?] : Does patient require any additional health services or referrals: No [Denied] : Denied [No] : No [Yes: Details:___] : Yes: [unfilled] [Completed, copy requested] : Completed, copy requested [Ordered] : Ordered [Yes] : Yes [Patient is not prescribed antipsychotic medication] : Patient is not prescribed antipsychotic medication [Does patient use tobacco products?] : Patient does not use tobacco products [Does patient use medical marijuana?] : Patient does not use medical marijuana. [Patient has been tested for HIV?] : Patient has not been tested for HIV [Patient has been tested for Hepatitis?] : Patient has not been tested for hepatitis [Patient would like to be tested/re-tested?] : patient would not like to be tested/re-tested [Current or past COVID-19 diagnosis?] : Patient does not have a current or past COVID-19 diagnosis [Vaccinated?] : is not vaccinated [Education provided about COVID-19?] : Education was not provided about COVID-19

## 2024-01-02 NOTE — PLAN
[FreeTextEntry4] : mood is ok  anxiety is chronic, less   ongoing health issues, sees specialists  patient seen monthly for ongoing medication management, has a chronic condition, goal to remain symptom free or maintain functioning in spite of depression/anxiety symptoms, she requires ongoing treatment

## 2024-01-02 NOTE — CURRENT PSYCHIATRIC SYMPTOMS
[Obsessions] : obsessions [Depressed Mood] : no depressed mood [Anhedonia] : no anhedonia [Guilt] : not feeling guilty [Decreased Concentration] : no decrease in concentrating ability [Hyperphagia] : no hyperphagia [Insomnia] : no insomnia disorder [Hypersomnia] : no ~T hypersomnia [Psychomotor Retardation] : no psychomotor retardation [Anorexia] : no anorexia [Euphoria] : no euphoria [Highly Irritable] : no high irritability [Increased Activity] : no increased in activity [Distractibility] : not distracted [Talkativeness] : no talkativeness [Grandeur] : no feelings of grandeur [Buying Sprees] : no buying sprees [Hypersexuality] : denied hypersexuality [Dec Need For Sleep] : no decreased need for sleep [Delusions] : no ~T delusions [Hallucination Visual] : no visual hallucinations [Hallucination Auditory] : no auditory hallucinations [Hallucination Tactile] : no tactile hallucinations [Thought Disorder] : ~T a thought disorder was not noted [Excessive Worry] : no excessive worries [Ruminations] : no rumination disorder [Re-experiencing] : no re-experiencing [Restlessness] : no restlessness [Hypochondriasis] : no hypochondriasis [Panic] : no panic disorder [de-identified] : concerning medical problems, is seeing providers

## 2024-01-02 NOTE — HISTORY OF PRESENT ILLNESS
[No] : no [FreeTextEntry1] : This is a 63 year old patient who presents for medication management.  The patient reports fairly stable mood, good, sleep and appetite.  The patient denies any symptoms of depression, soco, or psychosis at this time.  The patient relays less anxiety that impairs their daily functioning. The patient denies any suicidal ideation, homicidal ideation, no auditory or visual hallucinations, or paranoid ideation.  The patient has chronic medical complaints. The patient denies any drug or alcohol use, and is not smoking at this time. I requested the patient's updated physical and labs from their PCP.  Coping skills were discussed and a safety plan was provided.  The patient was educated on the risks, benefits, and alternatives of their psychiatric medications.  The patient will not engage in psychotherapy at this time.  The patient consents to ongoing medication management.  Patient reports being at baseline otherwise, support provided.  Educated that SSRI, SNRI, etc. will help with anxiety and that remeron 15mg would be more helpfule for anxiety at a higher dose.  the patient did not want any changes but more klonopin.  Patient was encouraged to seek a second opinion, otherwise will f/u in 1 month.   Patient has been seeing medical specialists. Risks, benefits discussed, see plan.  Patient describes a history of slurred speech, loss of motor coordination, appearing drunk, or impaied that is intermittent.  She often appears this way at visits.  She has been referred to neurology.  Patient maintains that she does not misuse her current medication or drugs, or alcohol.  Patient was educated that increasing klonopin would only increase risk of such episodes and klonopin will not be increased.  Risks, benefits discussed, will d/c abilify for now, as it made her "confused."

## 2024-01-02 NOTE — REASON FOR VISIT
[Telehealth (audio & video) - Individual/Group] : This visit was provided via telehealth using real-time 2-way audio visual technology. [Medical Office: ([facility])___] : The provider was located at the medical office in [unfilled]. [Home] : The patient, [unfilled], was located at home, [unfilled], at the time of the visit. [Verbal consent obtained from patient/other participant(s)] : Verbal consent for telehealth/telephonic services obtained from patient/other participant(s) [Verbal consent obtained from patient] : the patient, [unfilled] [FreeTextEntry4] : [id number] [FreeTextEntry5] : 1210pm [FreeTextEntry2] : 8/23, ISTOP checked [FreeTextEntry1] : "I good, making some progress, only on klonopin now, my MD put me on elavil 10mg."

## 2024-01-02 NOTE — PHYSICAL EXAM
[None] : none [Normal] : good [Fair] : fair [Loud] : not loud [Anxious] : no anxious [Irritable] : no irritable [Labile] : not labile

## 2024-01-02 NOTE — REASON FOR VISIT
[Telehealth (audio & video) - Individual/Group] : This visit was provided via telehealth using real-time 2-way audio visual technology. [Medical Office: ([facility])___] : The provider was located at the medical office in [unfilled]. [Home] : The patient, [unfilled], was located at home, [unfilled], at the time of the visit. [Verbal consent obtained from patient/other participant(s)] : Verbal consent for telehealth/telephonic services obtained from patient/other participant(s) [Verbal consent obtained from patient] : the patient, [unfilled] [FreeTextEntry4] : [id number] [FreeTextEntry5] : 1210pm [FreeTextEntry2] : 8/23, ISTOP checked [FreeTextEntry1] : "I good, making some progress, only on klonopin now, my MD put me on elavil 10mg."

## 2024-01-02 NOTE — CURRENT PSYCHIATRIC SYMPTOMS
[Obsessions] : obsessions [Depressed Mood] : no depressed mood [Anhedonia] : no anhedonia [Guilt] : not feeling guilty [Decreased Concentration] : no decrease in concentrating ability [Hyperphagia] : no hyperphagia [Insomnia] : no insomnia disorder [Hypersomnia] : no ~T hypersomnia [Psychomotor Retardation] : no psychomotor retardation [Anorexia] : no anorexia [Euphoria] : no euphoria [Highly Irritable] : no high irritability [Increased Activity] : no increased in activity [Distractibility] : not distracted [Talkativeness] : no talkativeness [Grandeur] : no feelings of grandeur [Buying Sprees] : no buying sprees [Hypersexuality] : denied hypersexuality [Dec Need For Sleep] : no decreased need for sleep [Delusions] : no ~T delusions [Hallucination Visual] : no visual hallucinations [Hallucination Auditory] : no auditory hallucinations [Hallucination Tactile] : no tactile hallucinations [Thought Disorder] : ~T a thought disorder was not noted [Excessive Worry] : no excessive worries [Ruminations] : no rumination disorder [Re-experiencing] : no re-experiencing [Restlessness] : no restlessness [Hypochondriasis] : no hypochondriasis [Panic] : no panic disorder [de-identified] : concerning medical problems, is seeing providers

## 2024-01-03 DIAGNOSIS — F42.9 OBSESSIVE-COMPULSIVE DISORDER, UNSPECIFIED: ICD-10-CM

## 2024-01-03 DIAGNOSIS — F32.9 MAJOR DEPRESSIVE DISORDER, SINGLE EPISODE, UNSPECIFIED: ICD-10-CM

## 2024-01-03 DIAGNOSIS — F41.1 GENERALIZED ANXIETY DISORDER: ICD-10-CM

## 2024-01-18 ENCOUNTER — OUTPATIENT (OUTPATIENT)
Dept: OUTPATIENT SERVICES | Facility: HOSPITAL | Age: 64
LOS: 1 days | End: 2024-01-18
Payer: MEDICAID

## 2024-01-18 VITALS
TEMPERATURE: 97 F | RESPIRATION RATE: 16 BRPM | OXYGEN SATURATION: 98 % | HEIGHT: 66 IN | SYSTOLIC BLOOD PRESSURE: 124 MMHG | WEIGHT: 126.1 LBS | HEART RATE: 66 BPM | DIASTOLIC BLOOD PRESSURE: 67 MMHG

## 2024-01-18 DIAGNOSIS — Z98.890 OTHER SPECIFIED POSTPROCEDURAL STATES: Chronic | ICD-10-CM

## 2024-01-18 DIAGNOSIS — Z96.611 PRESENCE OF RIGHT ARTIFICIAL SHOULDER JOINT: Chronic | ICD-10-CM

## 2024-01-18 DIAGNOSIS — T85.44XD CAPSULAR CONTRACTURE OF BREAST IMPLANT, SUBSEQUENT ENCOUNTER: ICD-10-CM

## 2024-01-18 DIAGNOSIS — Z01.818 ENCOUNTER FOR OTHER PREPROCEDURAL EXAMINATION: ICD-10-CM

## 2024-01-18 DIAGNOSIS — T85.43XD LEAKAGE OF BREAST PROSTHESIS AND IMPLANT, SUBSEQUENT ENCOUNTER: ICD-10-CM

## 2024-01-18 DIAGNOSIS — K80.70 CALCULUS OF GALLBLADDER AND BILE DUCT WITHOUT CHOLECYSTITIS WITHOUT OBSTRUCTION: Chronic | ICD-10-CM

## 2024-01-18 DIAGNOSIS — Z90.89 ACQUIRED ABSENCE OF OTHER ORGANS: Chronic | ICD-10-CM

## 2024-01-18 DIAGNOSIS — Z98.82 BREAST IMPLANT STATUS: Chronic | ICD-10-CM

## 2024-01-18 LAB
ALBUMIN SERPL ELPH-MCNC: 4.4 G/DL — SIGNIFICANT CHANGE UP (ref 3.5–5.2)
ALP SERPL-CCNC: 57 U/L — SIGNIFICANT CHANGE UP (ref 30–115)
ALT FLD-CCNC: 11 U/L — SIGNIFICANT CHANGE UP (ref 0–41)
ANION GAP SERPL CALC-SCNC: 11 MMOL/L — SIGNIFICANT CHANGE UP (ref 7–14)
AST SERPL-CCNC: 12 U/L — SIGNIFICANT CHANGE UP (ref 0–41)
BASOPHILS # BLD AUTO: 0.05 K/UL — SIGNIFICANT CHANGE UP (ref 0–0.2)
BASOPHILS NFR BLD AUTO: 0.7 % — SIGNIFICANT CHANGE UP (ref 0–1)
BILIRUB SERPL-MCNC: 0.4 MG/DL — SIGNIFICANT CHANGE UP (ref 0.2–1.2)
BUN SERPL-MCNC: 15 MG/DL — SIGNIFICANT CHANGE UP (ref 10–20)
CALCIUM SERPL-MCNC: 9.9 MG/DL — SIGNIFICANT CHANGE UP (ref 8.4–10.5)
CHLORIDE SERPL-SCNC: 103 MMOL/L — SIGNIFICANT CHANGE UP (ref 98–110)
CO2 SERPL-SCNC: 29 MMOL/L — SIGNIFICANT CHANGE UP (ref 17–32)
CREAT SERPL-MCNC: 0.7 MG/DL — SIGNIFICANT CHANGE UP (ref 0.7–1.5)
EGFR: 98 ML/MIN/1.73M2 — SIGNIFICANT CHANGE UP
EOSINOPHIL # BLD AUTO: 0.16 K/UL — SIGNIFICANT CHANGE UP (ref 0–0.7)
EOSINOPHIL NFR BLD AUTO: 2.4 % — SIGNIFICANT CHANGE UP (ref 0–8)
GLUCOSE SERPL-MCNC: 94 MG/DL — SIGNIFICANT CHANGE UP (ref 70–99)
HCT VFR BLD CALC: 46.7 % — SIGNIFICANT CHANGE UP (ref 37–47)
HGB BLD-MCNC: 15.4 G/DL — SIGNIFICANT CHANGE UP (ref 12–16)
IMM GRANULOCYTES NFR BLD AUTO: 0.1 % — SIGNIFICANT CHANGE UP (ref 0.1–0.3)
LYMPHOCYTES # BLD AUTO: 2.43 K/UL — SIGNIFICANT CHANGE UP (ref 1.2–3.4)
LYMPHOCYTES # BLD AUTO: 36.2 % — SIGNIFICANT CHANGE UP (ref 20.5–51.1)
MCHC RBC-ENTMCNC: 30.6 PG — SIGNIFICANT CHANGE UP (ref 27–31)
MCHC RBC-ENTMCNC: 33 G/DL — SIGNIFICANT CHANGE UP (ref 32–37)
MCV RBC AUTO: 92.7 FL — SIGNIFICANT CHANGE UP (ref 81–99)
MONOCYTES # BLD AUTO: 0.47 K/UL — SIGNIFICANT CHANGE UP (ref 0.1–0.6)
MONOCYTES NFR BLD AUTO: 7 % — SIGNIFICANT CHANGE UP (ref 1.7–9.3)
NEUTROPHILS # BLD AUTO: 3.6 K/UL — SIGNIFICANT CHANGE UP (ref 1.4–6.5)
NEUTROPHILS NFR BLD AUTO: 53.6 % — SIGNIFICANT CHANGE UP (ref 42.2–75.2)
NRBC # BLD: 0 /100 WBCS — SIGNIFICANT CHANGE UP (ref 0–0)
PLATELET # BLD AUTO: 334 K/UL — SIGNIFICANT CHANGE UP (ref 130–400)
PMV BLD: 10.7 FL — HIGH (ref 7.4–10.4)
POTASSIUM SERPL-MCNC: 5.2 MMOL/L — HIGH (ref 3.5–5)
POTASSIUM SERPL-SCNC: 5.2 MMOL/L — HIGH (ref 3.5–5)
PROT SERPL-MCNC: 7.3 G/DL — SIGNIFICANT CHANGE UP (ref 6–8)
RBC # BLD: 5.04 M/UL — SIGNIFICANT CHANGE UP (ref 4.2–5.4)
RBC # FLD: 13.5 % — SIGNIFICANT CHANGE UP (ref 11.5–14.5)
SODIUM SERPL-SCNC: 143 MMOL/L — SIGNIFICANT CHANGE UP (ref 135–146)
T3 SERPL-MCNC: 114 NG/DL — SIGNIFICANT CHANGE UP (ref 80–200)
T4 AB SER-ACNC: 7 UG/DL — SIGNIFICANT CHANGE UP (ref 4.6–12)
TSH SERPL-MCNC: 4.95 UIU/ML — HIGH (ref 0.27–4.2)
WBC # BLD: 6.72 K/UL — SIGNIFICANT CHANGE UP (ref 4.8–10.8)
WBC # FLD AUTO: 6.72 K/UL — SIGNIFICANT CHANGE UP (ref 4.8–10.8)

## 2024-01-18 PROCEDURE — 84443 ASSAY THYROID STIM HORMONE: CPT

## 2024-01-18 PROCEDURE — 84436 ASSAY OF TOTAL THYROXINE: CPT

## 2024-01-18 PROCEDURE — 93005 ELECTROCARDIOGRAM TRACING: CPT

## 2024-01-18 PROCEDURE — 84480 ASSAY TRIIODOTHYRONINE (T3): CPT

## 2024-01-18 PROCEDURE — 93010 ELECTROCARDIOGRAM REPORT: CPT

## 2024-01-18 PROCEDURE — 36415 COLL VENOUS BLD VENIPUNCTURE: CPT

## 2024-01-18 PROCEDURE — 85025 COMPLETE CBC W/AUTO DIFF WBC: CPT

## 2024-01-18 PROCEDURE — 99214 OFFICE O/P EST MOD 30 MIN: CPT | Mod: 25

## 2024-01-18 PROCEDURE — 80053 COMPREHEN METABOLIC PANEL: CPT

## 2024-01-18 RX ORDER — OMEPRAZOLE 10 MG/1
0 CAPSULE, DELAYED RELEASE ORAL
Qty: 0 | Refills: 0 | DISCHARGE

## 2024-01-18 RX ORDER — ESCITALOPRAM OXALATE 10 MG/1
2 TABLET, FILM COATED ORAL
Qty: 0 | Refills: 0 | DISCHARGE

## 2024-01-18 RX ORDER — MORPHINE SULFATE 50 MG/1
1 CAPSULE, EXTENDED RELEASE ORAL
Qty: 0 | Refills: 0 | DISCHARGE

## 2024-01-18 RX ORDER — CLONAZEPAM 1 MG
1 TABLET ORAL
Qty: 0 | Refills: 0 | DISCHARGE

## 2024-01-18 NOTE — H&P PST ADULT - NSICDXPASTMEDICALHX_GEN_ALL_CORE_FT
PAST MEDICAL HISTORY:  Anxiety     Bile duct stone with obstruct     Degenerative disc disease, lumbar     GERD (gastroesophageal reflux disease)     H/O low back pain     H/O sciatica     Hiatal hernia     Hypothyroidism     Neckache     Pelvic floor dysfunction in female

## 2024-01-18 NOTE — H&P PST ADULT - NSICDXPASTSURGICALHX_GEN_ALL_CORE_FT
PAST SURGICAL HISTORY:  H/O bilateral breast implants     H/O foot surgery     History of neck surgery     S/P shoulder replacement, right     S/P T&A (status post tonsillectomy and adenoidectomy)

## 2024-01-18 NOTE — H&P PST ADULT - HISTORY OF PRESENT ILLNESS
Patient is a  64 year old  female presenting to PAST in preparation for   BILATERAL BREAST IMPLANT CAPSULECTOMY AND RUPTURED IMPLANT REMOVAL on  2/1 under general anesthesia by Dr. Fitzgerald.    Pt has had breast implants for ~ 27 yrs, "they are ruptured & causing me pain& making me sick, I think it is causing me problems w/ my immune system" pain is in B/L breast "sharp& stabbing but comes& goes" uses heat& "I wait for it to pass" presently 4/10    PATIENT CURRENTLY DENIES CHEST PAIN  SHORTNESS OF BREATH  PALPITATIONS,  DYSURIA, OR UPPER RESPIRATORY INFECTION IN PAST 2 WEEKS    denies travel outside the [place] in the past 30 days  Patient denies any signs or symptoms of COVID 19    Anesthesia Alert  NO--Difficult Airway  YES--History of neck surgery or radiation  NO--Limited ROM of neck  NO--History of Malignant hyperthermia  NO--No personal or family history of Pseudocholinesterase deficiency.  NO--Prior Anesthesia Complication  NO--Latex Allergy  NO--Loose teeth  NO--History of Rheumatoid Arthritis  NO--Bleeding risk  NO--CALLY  NO--Other_____    PT DENIES ANY RASHES, ABRASION, OR OPEN WOUNDS OR CUTS    AS PER THE PT, THIS IS HIS/HER COMPLETE MEDICAL AND SURGICAL HX, INCLUDING MEDICATIONS PRESCRIBED AND OVER THE COUNTER    Patient verbalized understanding of instructions and was given the opportunity to ask questions and have them answered.    pt denies any suicidal ideation or thoughts, pt states not a threat to self or others    Revised Cardiac Risk Index for Pre-Operative Risk from [employer]  on 1/18/2024  ** All calculations should be rechecked by clinician prior to use **    RESULT SUMMARY:  0 points  Class I Risk    3.9 %  30-day risk of death, MI, or cardiac arrest    From [employer] 2017. These numbers are higher than those from the original study (Jos 1999). See Evidence for details.    INPUTS:  Elevated-risk surgery —> 0 = No  History of ischemic heart disease —> 0 = No  History of congestive heart failure —> 0 = No  History of cerebrovascular disease —> 0 = No  Pre-operative treatment with insulin —> 0 = No  Pre-operative creatinine >2 mg/dL / 176.8 µmol/L —> 0 = No    [facility] Activity Status Index (DASI) from [employer]  on 1/18/2024  ** All calculations should be rechecked by clinician prior to use **    RESULT SUMMARY:  15.45 points  The higher the score (maximum 58.2), the higher the functional status.    4.64 METs    INPUTS:  Take care of self —> 2.75 = Yes  Walk indoors —> 1.75 = Yes  Walk 1&ndash;2 blocks on level ground —> 2.75 = Yes  Climb a flight of stairs or walk up a hill —> 5.5 = Yes  Run a short distance —> 0 = No  Do light work around the house —> 2.7 = Yes  Do moderate work around the house —> 0 = No  Do heavy work around the house —> 0 = No  Do yardwork —> 0 = No  Have sexual relations —> 0 = No (D/T Pelvic Floor atrophy)  Participate in moderate recreational activities —> 0 = No  Participate in strenuous sports —> 0 = No

## 2024-01-18 NOTE — H&P PST ADULT - NS MD HP INPLANTS MED DEV
right shoulder, cspine hardware, B/L breast reduction, right hand ORIF/Artificial joint/Breast implant

## 2024-01-19 DIAGNOSIS — T85.43XD LEAKAGE OF BREAST PROSTHESIS AND IMPLANT, SUBSEQUENT ENCOUNTER: ICD-10-CM

## 2024-01-19 DIAGNOSIS — Z01.818 ENCOUNTER FOR OTHER PREPROCEDURAL EXAMINATION: ICD-10-CM

## 2024-01-30 ENCOUNTER — OUTPATIENT (OUTPATIENT)
Dept: OUTPATIENT SERVICES | Facility: HOSPITAL | Age: 64
LOS: 1 days | End: 2024-01-30
Payer: COMMERCIAL

## 2024-01-30 ENCOUNTER — APPOINTMENT (OUTPATIENT)
Dept: PSYCHIATRY | Facility: CLINIC | Age: 64
End: 2024-01-30
Payer: COMMERCIAL

## 2024-01-30 DIAGNOSIS — Z96.611 PRESENCE OF RIGHT ARTIFICIAL SHOULDER JOINT: Chronic | ICD-10-CM

## 2024-01-30 DIAGNOSIS — Z98.890 OTHER SPECIFIED POSTPROCEDURAL STATES: Chronic | ICD-10-CM

## 2024-01-30 DIAGNOSIS — F42.9 OBSESSIVE-COMPULSIVE DISORDER, UNSPECIFIED: ICD-10-CM

## 2024-01-30 DIAGNOSIS — Z90.89 ACQUIRED ABSENCE OF OTHER ORGANS: Chronic | ICD-10-CM

## 2024-01-30 DIAGNOSIS — F41.1 GENERALIZED ANXIETY DISORDER: ICD-10-CM

## 2024-01-30 DIAGNOSIS — F32.9 MAJOR DEPRESSIVE DISORDER, SINGLE EPISODE, UNSPECIFIED: ICD-10-CM

## 2024-01-30 DIAGNOSIS — Z98.82 BREAST IMPLANT STATUS: Chronic | ICD-10-CM

## 2024-01-30 PROBLEM — Z87.39 PERSONAL HISTORY OF OTHER DISEASES OF THE MUSCULOSKELETAL SYSTEM AND CONNECTIVE TISSUE: Chronic | Status: ACTIVE | Noted: 2024-01-18

## 2024-01-30 PROCEDURE — 99214 OFFICE O/P EST MOD 30 MIN: CPT | Mod: 95

## 2024-01-30 NOTE — HISTORY OF PRESENT ILLNESS
[No] : no [FreeTextEntry1] : This is a 63 year old patient who presents for medication management.  The patient reports fairly stable mood, good, sleep and appetite.  The patient denies any symptoms of depression, soco, or psychosis at this time.  The patient relays less anxiety that impairs their daily functioning. The patient denies any suicidal ideation, homicidal ideation, no auditory or visual hallucinations, or paranoid ideation.  The patient has chronic medical complaints. The patient denies any drug or alcohol use, and is not smoking at this time. I requested the patient's updated physical and labs from their PCP.  Coping skills were discussed and a safety plan was provided.  The patient was educated on the risks, benefits, and alternatives of their psychiatric medications.  The patient will not engage in psychotherapy at this time.  The patient consents to ongoing medication management.  Patient reports being at baseline otherwise, support provided. the patient did not want any changes but more klonopin.  Patient was encouraged to seek a second opinion, otherwise will f/u in 1 month.   Patient has been seeing medical specialists. Risks, benefits discussed, see plan.  Patient describes a history of slurred speech, loss of motor coordination, appearing drunk, or impaired that is intermittent.  She often appears this way at visits.  She has been referred to neurology.  Patient maintains that she does not misuse her current medication or drugs, or alcohol.  Patient was educated that increasing klonopin would only increase risk of such episodes and klonopin will not be increased.

## 2024-01-30 NOTE — CURRENT PSYCHIATRIC SYMPTOMS
[Obsessions] : obsessions [Depressed Mood] : no depressed mood [Anhedonia] : no anhedonia [Guilt] : not feeling guilty [Decreased Concentration] : no decrease in concentrating ability [Hyperphagia] : no hyperphagia [Insomnia] : no insomnia disorder [Hypersomnia] : no ~T hypersomnia [Psychomotor Retardation] : no psychomotor retardation [Anorexia] : no anorexia [Euphoria] : no euphoria [Highly Irritable] : no high irritability [Increased Activity] : no increased in activity [Distractibility] : not distracted [Talkativeness] : no talkativeness [Grandeur] : no feelings of grandeur [Buying Sprees] : no buying sprees [Hypersexuality] : denied hypersexuality [Dec Need For Sleep] : no decreased need for sleep [Delusions] : no ~T delusions [Hallucination Visual] : no visual hallucinations [Hallucination Auditory] : no auditory hallucinations [Hallucination Tactile] : no tactile hallucinations [Thought Disorder] : ~T a thought disorder was not noted [Excessive Worry] : no excessive worries [Ruminations] : no rumination disorder [Re-experiencing] : no re-experiencing [Restlessness] : no restlessness [Hypochondriasis] : no hypochondriasis [Panic] : no panic disorder [de-identified] : concerning medical problems, is seeing providers

## 2024-01-30 NOTE — REASON FOR VISIT
[Telehealth (audio & video) - Individual/Group] : This visit was provided via telehealth using real-time 2-way audio visual technology. [Medical Office: ([facility])___] : The provider was located at the medical office in [unfilled]. [Home] : The patient, [unfilled], was located at home, [unfilled], at the time of the visit. [Verbal consent obtained from patient/other participant(s)] : Verbal consent for telehealth/telephonic services obtained from patient/other participant(s) [Verbal consent obtained from patient] : the patient, [unfilled] [FreeTextEntry4] : 1217pm [FreeTextEntry5] : [id number] [FreeTextEntry2] : 8/23, ISTOP checked [FreeTextEntry1] : "I am ok, going for double mastectomy."

## 2024-01-30 NOTE — DISCUSSION/SUMMARY
[Date of Last Physical Exam: _____] : Date of Last Physical Exam: [unfilled] [Date of Last Annual Labs: _____] : Date of Last Annual Labs: [unfilled] [Annual Review of Systems Completed?] : Annual Review of Systems Completed: Yes [Tobacco Screening Completed?] : Tobacco Screening Completed: Yes [Date of Last HbgA1c: _____] : Date of Last HbgA1c: [unfilled] [Date of Last Lipid Profile: _____] : Date of Last Lipid Profile: [unfilled] [Potential impact of patient’s physical health conditions on psychiatric care?] : Potential impact of patient's physical health conditions on psychiatric care: Yes [Does patient require any additional health services or referrals?] : Does patient require any additional health services or referrals: No

## 2024-01-30 NOTE — ASSESSMENT
[FreeTextEntry1] : Patient relays less anxiety related to medical co-morbidities as she made improvement with URO/GYN. She appears much clearer today and has normal speech.  Current medications 1. now on elavil 10mg at bedtime from PMD 2. klonopin 0.5mg po BID 3. hydroxyzine 25mg-75mg at bedtime prn (will no longer use loratadine), not  needed    follow up in 4 weeks

## 2024-01-31 DIAGNOSIS — F32.9 MAJOR DEPRESSIVE DISORDER, SINGLE EPISODE, UNSPECIFIED: ICD-10-CM

## 2024-01-31 DIAGNOSIS — F42.9 OBSESSIVE-COMPULSIVE DISORDER, UNSPECIFIED: ICD-10-CM

## 2024-01-31 DIAGNOSIS — F41.1 GENERALIZED ANXIETY DISORDER: ICD-10-CM

## 2024-01-31 NOTE — ASU PATIENT PROFILE, ADULT - FALL HARM RISK - UNIVERSAL INTERVENTIONS
Bed in lowest position, wheels locked, appropriate side rails in place/Call bell, personal items and telephone in reach/Instruct patient to call for assistance before getting out of bed or chair/Non-slip footwear when patient is out of bed/[place] to call system/Physically safe environment - no spills, clutter or unnecessary equipment/Purposeful Proactive Rounding/Room/bathroom lighting operational, light cord in reach

## 2024-02-01 ENCOUNTER — TRANSCRIPTION ENCOUNTER (OUTPATIENT)
Age: 64
End: 2024-02-01

## 2024-02-01 ENCOUNTER — APPOINTMENT (OUTPATIENT)
Dept: PLASTIC SURGERY | Facility: AMBULATORY SURGERY CENTER | Age: 64
End: 2024-02-01
Payer: MEDICAID

## 2024-02-01 ENCOUNTER — OUTPATIENT (OUTPATIENT)
Dept: OUTPATIENT SERVICES | Facility: HOSPITAL | Age: 64
LOS: 1 days | Discharge: HOME | End: 2024-02-01
Payer: MEDICAID

## 2024-02-01 ENCOUNTER — RESULT REVIEW (OUTPATIENT)
Age: 64
End: 2024-02-01

## 2024-02-01 VITALS
TEMPERATURE: 98 F | SYSTOLIC BLOOD PRESSURE: 119 MMHG | OXYGEN SATURATION: 100 % | HEIGHT: 66 IN | RESPIRATION RATE: 20 BRPM | WEIGHT: 126.1 LBS | HEART RATE: 72 BPM | DIASTOLIC BLOOD PRESSURE: 54 MMHG

## 2024-02-01 VITALS
HEART RATE: 69 BPM | SYSTOLIC BLOOD PRESSURE: 103 MMHG | DIASTOLIC BLOOD PRESSURE: 54 MMHG | RESPIRATION RATE: 28 BRPM | OXYGEN SATURATION: 97 %

## 2024-02-01 DIAGNOSIS — Z98.82 BREAST IMPLANT STATUS: Chronic | ICD-10-CM

## 2024-02-01 DIAGNOSIS — Z96.611 PRESENCE OF RIGHT ARTIFICIAL SHOULDER JOINT: Chronic | ICD-10-CM

## 2024-02-01 DIAGNOSIS — Z90.89 ACQUIRED ABSENCE OF OTHER ORGANS: Chronic | ICD-10-CM

## 2024-02-01 DIAGNOSIS — G89.18 OTHER ACUTE POSTPROCEDURAL PAIN: ICD-10-CM

## 2024-02-01 DIAGNOSIS — Z98.890 OTHER SPECIFIED POSTPROCEDURAL STATES: Chronic | ICD-10-CM

## 2024-02-01 DIAGNOSIS — T85.43XD LEAKAGE OF BREAST PROSTHESIS AND IMPLANT, SUBSEQUENT ENCOUNTER: ICD-10-CM

## 2024-02-01 DIAGNOSIS — T85.44XD CAPSULAR CONTRACTURE OF BREAST IMPLANT, SUBSEQUENT ENCOUNTER: ICD-10-CM

## 2024-02-01 PROCEDURE — 88305 TISSUE EXAM BY PATHOLOGIST: CPT | Mod: 26

## 2024-02-01 PROCEDURE — 19371 PERI-IMPLT CAPSLC BRST COMPL: CPT | Mod: 50,59

## 2024-02-01 PROCEDURE — 88300 SURGICAL PATH GROSS: CPT | Mod: 26,59

## 2024-02-01 PROCEDURE — 88305 TISSUE EXAM BY PATHOLOGIST: CPT

## 2024-02-01 PROCEDURE — 88300 SURGICAL PATH GROSS: CPT

## 2024-02-01 PROCEDURE — 19330 RMVL RUPTURED BREAST IMPLANT: CPT | Mod: 50,59

## 2024-02-01 RX ORDER — ACETAMINOPHEN 500 MG
2 TABLET ORAL
Refills: 0 | DISCHARGE

## 2024-02-01 RX ORDER — CLONAZEPAM 1 MG
0 TABLET ORAL
Refills: 0 | DISCHARGE

## 2024-02-01 RX ORDER — TRAMADOL HYDROCHLORIDE 50 MG/1
1 TABLET ORAL
Qty: 9 | Refills: 0
Start: 2024-02-01 | End: 2024-02-03

## 2024-02-01 RX ORDER — AMITRIPTYLINE HCL 25 MG
1 TABLET ORAL
Refills: 0 | DISCHARGE

## 2024-02-01 RX ORDER — ALBUTEROL 90 UG/1
2 AEROSOL, METERED ORAL
Refills: 0 | DISCHARGE

## 2024-02-01 RX ORDER — HEPARIN SODIUM 5000 [USP'U]/ML
5000 INJECTION INTRAVENOUS; SUBCUTANEOUS ONCE
Refills: 0 | Status: COMPLETED | OUTPATIENT
Start: 2024-02-01 | End: 2024-02-01

## 2024-02-01 RX ORDER — OXYCODONE AND ACETAMINOPHEN 5; 325 MG/1; MG/1
2 TABLET ORAL EVERY 6 HOURS
Refills: 0 | Status: DISCONTINUED | OUTPATIENT
Start: 2024-02-01 | End: 2024-02-01

## 2024-02-01 RX ORDER — GABAPENTIN 400 MG/1
0 CAPSULE ORAL
Refills: 0 | DISCHARGE

## 2024-02-01 RX ORDER — ONDANSETRON 8 MG/1
4 TABLET, FILM COATED ORAL ONCE
Refills: 0 | Status: DISCONTINUED | OUTPATIENT
Start: 2024-02-01 | End: 2024-02-01

## 2024-02-01 RX ORDER — SODIUM CHLORIDE 9 MG/ML
500 INJECTION, SOLUTION INTRAVENOUS
Refills: 0 | Status: DISCONTINUED | OUTPATIENT
Start: 2024-02-01 | End: 2024-02-01

## 2024-02-01 RX ORDER — METHOCARBAMOL 500 MG/1
1 TABLET, FILM COATED ORAL
Refills: 0 | DISCHARGE

## 2024-02-01 RX ORDER — HYDROMORPHONE HYDROCHLORIDE 2 MG/ML
0.5 INJECTION INTRAMUSCULAR; INTRAVENOUS; SUBCUTANEOUS
Refills: 0 | Status: DISCONTINUED | OUTPATIENT
Start: 2024-02-01 | End: 2024-02-01

## 2024-02-01 RX ADMIN — SODIUM CHLORIDE 100 MILLILITER(S): 9 INJECTION, SOLUTION INTRAVENOUS at 10:13

## 2024-02-01 RX ADMIN — HYDROMORPHONE HYDROCHLORIDE 0.5 MILLIGRAM(S): 2 INJECTION INTRAMUSCULAR; INTRAVENOUS; SUBCUTANEOUS at 10:12

## 2024-02-01 RX ADMIN — HYDROMORPHONE HYDROCHLORIDE 0.5 MILLIGRAM(S): 2 INJECTION INTRAMUSCULAR; INTRAVENOUS; SUBCUTANEOUS at 10:32

## 2024-02-01 RX ADMIN — HEPARIN SODIUM 5000 UNIT(S): 5000 INJECTION INTRAVENOUS; SUBCUTANEOUS at 07:34

## 2024-02-01 NOTE — BRIEF OPERATIVE NOTE - OPERATION/FINDINGS
C/w bilateral silicone breast implant rupture  S/p removal of implants with bilateral breast capsulectomies

## 2024-02-01 NOTE — ASU DISCHARGE PLAN (ADULT/PEDIATRIC) - NS MD DC FALL RISK RISK
For information on Fall & Injury Prevention, visit: [email or web address] OR  [email or web address] OR  https://www.cdc.gov/steadi/patient.html

## 2024-02-01 NOTE — ASU DISCHARGE PLAN (ADULT/PEDIATRIC) - ASU DC SPECIAL INSTRUCTIONSFT
Please follow up with Dr. Fitzgerald. Please call her office at the number provided within 48 hours of leaving the hospital to set up this appointment.     DRAINS: You will be discharged with NIDIA drains. You will need to empty them and record outputs accurately. This will be taught to you by the nursing staff. Please do not remove the NIDIA drains. They will be removed in the office. Please bring to the office accurate records of output.     Keep surgical bra on until cleared.     Pain meds:   - Extra strength tylenol routinely. Please do not exceed 4,000mg in one day.   - Tramadol every 8 to 12 hours or as needed for severe pain     Sleep on your back     No heavy lifting, pushing or pulling    Do not get bandages or drain sites wet

## 2024-02-01 NOTE — CHART NOTE - NSCHARTNOTEFT_GEN_A_CORE
PACU ANESTHESIA ADMISSION NOTE      Procedure: Removal of breast implant with capsulectomy  Post op diagnosis:  Breast implant capsular contracture    Ruptured silicone breast implant      __x__  Patent Airway    _x___  Full return of protective reflexes    _x___  Full recovery from anesthesia / back to baseline     Vitals:   T: 97.3          R:     18             BP:   99/55               Sat: 98%                  P: 74      Mental Status:  _x___ Awake   _____ Alert   _____ Drowsy   _____ Sedated    Nausea/Vomiting:  ____ NO  ______Yes,   See Post - Op Orders          Pain Scale (0-10):  _____    Treatment: ____ None    ___x_ See Post - Op/PCA Orders    Post - Operative Fluids:   ____ Oral   __x__ See Post - Op Orders    Plan: Discharge:   __x__Home       _____Floor     _____Critical Care    _____  Other:_________________    Comments: Pt tolerated procedure well, no anesthesia related complications. Care of pt endorsed to PACU, report given to PACU RN. Discharge when criteria are met.

## 2024-02-01 NOTE — ASU DISCHARGE PLAN (ADULT/PEDIATRIC) - CARE PROVIDER_API CALL
Marisa Fitzgerald  Plastic Surgery  [address] [place] [address], [address]  [place] [address]  Phone: [phone]  Fax: [phone]  Established Patient  Follow Up Time: 1 week

## 2024-02-01 NOTE — BRIEF OPERATIVE NOTE - NSICDXBRIEFPOSTOP_GEN_ALL_CORE_FT
POST-OP DIAGNOSIS:  Ruptured silicone breast implant 01-Feb-2024 09:57:00  Zoraida Blount  Breast implant capsular contracture 01-Feb-2024 09:56:55  Zoraida Blount

## 2024-02-01 NOTE — BRIEF OPERATIVE NOTE - NSICDXBRIEFPREOP_GEN_ALL_CORE_FT
PRE-OP DIAGNOSIS:  Breast implant capsular contracture 01-Feb-2024 09:56:37  Zoraida Blount  Ruptured silicone breast implant 01-Feb-2024 09:56:51  Zoraida Blount

## 2024-02-02 LAB — SURGICAL PATHOLOGY STUDY: SIGNIFICANT CHANGE UP

## 2024-02-06 DIAGNOSIS — T85.41XA BREAKDOWN (MECHANICAL) OF BREAST PROSTHESIS AND IMPLANT, INITIAL ENCOUNTER: ICD-10-CM
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
## 2024-02-06 DIAGNOSIS — F41.9 ANXIETY DISORDER, UNSPECIFIED: ICD-10-CM

## 2024-02-06 DIAGNOSIS — F32.9 MAJOR DEPRESSIVE DISORDER, SINGLE EPISODE, UNSPECIFIED: ICD-10-CM

## 2024-02-08 ENCOUNTER — APPOINTMENT (OUTPATIENT)
Dept: DERMATOLOGY | Facility: CLINIC | Age: 64
End: 2024-02-08

## 2024-02-08 ENCOUNTER — APPOINTMENT (OUTPATIENT)
Dept: PLASTIC SURGERY | Facility: CLINIC | Age: 64
End: 2024-02-08
Payer: MEDICAID

## 2024-02-08 DIAGNOSIS — B37.2 CANDIDIASIS OF SKIN AND NAIL: ICD-10-CM

## 2024-02-08 PROBLEM — Z86.69 PERSONAL HISTORY OF OTHER DISEASES OF THE NERVOUS SYSTEM AND SENSE ORGANS: Chronic | Status: ACTIVE | Noted: 2024-01-18

## 2024-02-08 PROBLEM — E03.9 HYPOTHYROIDISM, UNSPECIFIED: Chronic | Status: ACTIVE | Noted: 2024-01-18

## 2024-02-08 PROCEDURE — 99024 POSTOP FOLLOW-UP VISIT: CPT

## 2024-02-08 RX ORDER — NYSTATIN 100000 [USP'U]/G
100000 CREAM TOPICAL 3 TIMES DAILY
Qty: 1 | Refills: 1 | Status: ACTIVE | COMMUNITY
Start: 2024-02-08 | End: 1900-01-01

## 2024-02-08 RX ORDER — NYSTATIN 100000 1/G
100000 POWDER TOPICAL DAILY
Qty: 1 | Refills: 0 | Status: ACTIVE | COMMUNITY
Start: 2024-02-08 | End: 1900-01-01

## 2024-02-08 NOTE — PHYSICAL EXAM
[de-identified] : well-appearing, NAD [de-identified] : nonlabored breathing  [de-identified] : Right breast with diffuse bruising and swelling with palpable collection superomedial breast, soft, nontender, IMF incision with diffuse erythematous fungal rash, drain functional with ss output  Left breast soft, nontender, IMF incision with erythematous fungal rash, no fluid collection, drain with minimal clear output

## 2024-02-08 NOTE — DATA REVIEWED
[FreeTextEntry1] : [facility] Accession Number : [id number] Patient:     TITO JAIN   Accession:                             [id number]  Collected Date/Time:                   2/1/2024 08:35 EST Received Date/Time:                    2/1/2024 10:11 EST  Surgical Pathology Report - Auth (Verified)  Specimen(s) Submitted 1  Left breast implant 2  Left breast capsule Time obtained: 8:45 am Time in formalin: 8:45 am 3  Right breast implant 4  Right breast capsule Time obtained: 9:14 am Time in formalin: 9:14 am   Final Diagnosis 1.  Breast, left implant, removal: - Ruptured synthetic breast implant (macroscopic diagnosis).  2.  Breast, left tissue, capsulectomy: - Benign fibroadipose connective tissue with dense linear fibrosis associated with coarse stromal calcification, chronic lymphohistiocytic cell inflammation, and silicone granulomata; consistent with a synthetic breast implant soft tissue capsule.  3.  Breast, right implant, removal: - Ruptured synthetic breast implant (macroscopic diagnosis).  4.  Breast, right tissue, capsulectomy: - Benign fibroadipose connective tissue with dense linear fibrosis associated with car stromal calcification, chronic lymphohistiocytic cell inflammation, and silicone granulomata; consistent with a synthetic breast implant soft tissue capsule. Verified by: Cruz Pruett M.D.  (Electronic Signature) Reported on: 02/02/24 14:38 [facility], [place] [address] Phone: [phone]   Fax: [phone] _________________________________________________________________  Clinical Information Bilateral breast ruptured implant, removal and total capsulectomy  Perioperative Diagnosis Bilateral breast ruptured silicone implants  Postoperative Diagnosis  Bilateral breast ruptured silicone implants  Gross Description 1.  The specimen received is labeled "left breast implant".  The specimen consists of a ruptured breast implant and clear gelatinous contents, measuring 13 x 11 x 1 cm and bears the inscription "Silastic 2 to 45 cc". Gross description only. No sections submitted.  2.  The specimen received in formalin is labeled "left breast capsule". The specimen consists of a tan-yellow membranous tissue fragment firm to hard in consistency, measuring 9 x 8 x 2.5 cm. Representative sections are submitted. (1 block)  3.  The specimen received is labeled "right breast implant".  The specimen consists of a ruptured breast implant with yellow gelatinous contents, measuring 10 x 9 x 3 cm and bears the inscription "Silastic 2 245 CC".  Gross description only. No sections submitted.  4.  The specimen received in formalin is labeled "right breast capsule". The specimen consists of a tan-yellow membranous tissue fragment firm to hard in consistency, measuring 9 x 7 x 3 cm. Representative sections are submitted. (1 block)  02/01/2024 12:34:03 EST    OC  Disclaimer In addition to other data that may appear on the specimen containers, all labels have been inspected to confirm the presence of the patient's name  and date of birth.  Specimen was received and underwent gross examination at [facility], [address] [place] [address].

## 2024-02-08 NOTE — ASSESSMENT
[FreeTextEntry1] : 64 yo F with PMHx o MDD, OCD ss/p retroglandular silicone implant augmentation 33 years ago ([facility]) with symptomatic grade 4 capsular contracture BL breast, likely rupture right breast implant.  Now POD#7 s/p Bilateral ruptured breast implant removal and capsulectomy.   Right breast hematoma s/p needle spiration with 5 cc dark thick old blood, not liquified. BL IMF fungal rash.   - left drain removed and all dressings changed - continue right breast drain in anticipation of hematoma liquification - Rx Nystatin powder/cream for IMF rash and keep area dry  - pt reassured - pathology discussed, ruptured implants, capsule - all questions were answered  - f/u next week for hematoma aspiration

## 2024-02-08 NOTE — HISTORY OF PRESENT ILLNESS
[FreeTextEntry1] : 64 yo F with PMHx anxiety, OCD, depression GERD s/p retroglandular silicone implant augmentation 33 years ago ([facility]).  Referred for management of capsular contracture findings on mammogram (9/26/23) and clinically.  Pt has noted change in breast implant shape several years ago as well and increasing breast pain beginning on right breast after right shoulder 2 years ago with subsequent change from round to "odd" shape. Now with increasing left breast pain.  Denies breast masses or nipple discharge or retraction BL.  No implant card or info available.  Here to discuss options for removal of breast implants.  Life partner present during exam.  Current smoker, 2-3 cig per day  Interval hx (11/13/23)  here to discuss breast MRI and tx options.   Here with ficherie. No new health issues.  She decided to undergo BL breast implant removal and does not wish to consider breast lift at the same time.    Interval hx (2/8/24) Pt is here POD#7 s/p removal of BL breast implants with capsulectomy c/o redness and pruritis at surgical sites and right breast bruising and swelling. Denies any f/c or bleeding. Reports high right breast drain output in the first few days after surgery now minimal. Drains functional R 15/10 cc daily, Lt 5 cc daily.

## 2024-02-13 PROBLEM — M62.89 OTHER SPECIFIED DISORDERS OF MUSCLE: Chronic | Status: ACTIVE | Noted: 2024-01-18

## 2024-02-14 ENCOUNTER — APPOINTMENT (OUTPATIENT)
Dept: PLASTIC SURGERY | Facility: CLINIC | Age: 64
End: 2024-02-14
Payer: MEDICAID

## 2024-02-14 PROCEDURE — 99024 POSTOP FOLLOW-UP VISIT: CPT

## 2024-02-14 NOTE — PHYSICAL EXAM
[de-identified] : well-appearing, NAD [de-identified] : nonlabored breathing  [de-identified] : Right breast with diffuse bruising and swelling with firm superomedial breast, no palpable fluid wave. Remainder of breast is soft, nontender, IMF incision with improved fungal rash, IMF drain functional with zero output  Left breast soft, nontender, IMF incision with resolved fungal rash, no fluid collection

## 2024-02-14 NOTE — ASSESSMENT
[FreeTextEntry1] : 62 yo F with PMHx o MDD, OCD ss/p retroglandular silicone implant augmentation 33 years ago ([facility]) with symptomatic grade 4 capsular contracture BL breast, likely rupture right breast implant.  Now POD#13 s/p Bilateral ruptured breast implant removal and capsulectomy.    Right breast hematoma s/p needle spiration with <1 cc dark thick old blood, not liquified. BL IMF fungal rash essentially resolved  - continue right breast drain in anticipation of hematoma liquification. Drain stripped and flushed to ensure patency  - Warm compress to R breast - Continue Nystatin powder/cream  - pt reassured - pathology discussed, ruptured implants, capsule - all questions were answered  - f/u early next week for drain removal

## 2024-02-14 NOTE — HISTORY OF PRESENT ILLNESS
[FreeTextEntry1] : 64 yo F with PMHx anxiety, OCD, depression GERD s/p retroglandular silicone implant augmentation 33 years ago ([facility]).  Referred for management of capsular contracture findings on mammogram (9/26/23) and clinically.  Pt has noted change in breast implant shape several years ago as well and increasing breast pain beginning on right breast after right shoulder 2 years ago with subsequent change from round to "odd" shape. Now with increasing left breast pain.  Denies breast masses or nipple discharge or retraction BL.  No implant card or info available.  Here to discuss options for removal of breast implants.  Life partner present during exam.  Current smoker, 2-3 cig per day  Interval hx (11/13/23)  here to discuss breast MRI and tx options.   Here with fiance. No new health issues.  She decided to undergo BL breast implant removal and does not wish to consider breast lift at the same time.    Interval hx (2/8/24) Pt is here POD#7 s/p removal of BL breast implants with capsulectomy c/o redness and pruritis at surgical sites and right breast bruising and swelling. Denies any f/c or bleeding. Reports high right breast drain output in the first few days after surgery now minimal. Drains functional R 15/10 cc daily, Lt 5 cc daily.  Interval hx (2/14/24) Pt is here POD#13 s/p removal of BL breast implants with capsulectomy. Pt still has R drain in place, reports zero output despite stripping daily. Denies f/c/drainage. Using nystatin powder for IMF rash which she states is much improved. Ambulating. Wearing compression bra.

## 2024-02-14 NOTE — DATA REVIEWED
[FreeTextEntry1] : [facility] Accession Number : [id number] Patient:     TITO JAIN   Accession:                             [id number]  Collected Date/Time:                   2/1/2024 08:35 EST Received Date/Time:                    2/1/2024 10:11 EST  Surgical Pathology Report - Auth (Verified)  Specimen(s) Submitted 1  Left breast implant 2  Left breast capsule Time obtained: 8:45 am Time in formalin: 8:45 am 3  Right breast implant 4  Right breast capsule Time obtained: 9:14 am Time in formalin: 9:14 am   Final Diagnosis 1.  Breast, left implant, removal: - Ruptured synthetic breast implant (macroscopic diagnosis).  2.  Breast, left tissue, capsulectomy: - Benign fibroadipose connective tissue with dense linear fibrosis associated with coarse stromal calcification, chronic lymphohistiocytic cell inflammation, and silicone granulomata; consistent with a synthetic breast implant soft tissue capsule.  3.  Breast, right implant, removal: - Ruptured synthetic breast implant (macroscopic diagnosis).  4.  Breast, right tissue, capsulectomy: - Benign fibroadipose connective tissue with dense linear fibrosis associated with car stromal calcification, chronic lymphohistiocytic cell inflammation, and silicone granulomata; consistent with a synthetic breast implant soft tissue capsule. Verified by: Cruz Pruett M.D.  (Electronic Signature) Reported on: 02/02/24 14:38 [facility], [place] [address] Phone: [phone]   Fax: [phone] _________________________________________________________________  Clinical Information Bilateral breast ruptured implant, removal and total capsulectomy  Perioperative Diagnosis Bilateral breast ruptured silicone implants  Postoperative Diagnosis  Bilateral breast ruptured silicone implants  Gross Description 1.  The specimen received is labeled "left breast implant".  The specimen consists of a ruptured breast implant and clear gelatinous contents, measuring 13 x 11 x 1 cm and bears the inscription "Silastic 2 to 45 cc". Gross description only. No sections submitted.  2.  The specimen received in formalin is labeled "left breast capsule". The specimen consists of a tan-yellow membranous tissue fragment firm to hard in consistency, measuring 9 x 8 x 2.5 cm. Representative sections are submitted. (1 block)  3.  The specimen received is labeled "right breast implant".  The specimen consists of a ruptured breast implant with yellow gelatinous contents, measuring 10 x 9 x 3 cm and bears the inscription "Silastic 2 245 CC".  Gross description only. No sections submitted.  4.  The specimen received in formalin is labeled "right breast capsule". The specimen consists of a tan-yellow membranous tissue fragment firm to hard in consistency, measuring 9 x 7 x 3 cm. Representative sections are submitted. (1 block)  02/01/2024 12:34:03 EST    OC  Disclaimer In addition to other data that may appear on the specimen containers, all labels have been inspected to confirm the presence of the patient's name  and date of birth.  Specimen was received and underwent gross examination at [facility], [address] [place] [address].

## 2024-02-15 ENCOUNTER — APPOINTMENT (OUTPATIENT)
Dept: RHEUMATOLOGY | Facility: CLINIC | Age: 64
End: 2024-02-15
Payer: MEDICAID

## 2024-02-15 VITALS
HEART RATE: 86 BPM | DIASTOLIC BLOOD PRESSURE: 80 MMHG | HEIGHT: 66 IN | OXYGEN SATURATION: 95 % | BODY MASS INDEX: 19.44 KG/M2 | WEIGHT: 121 LBS | SYSTOLIC BLOOD PRESSURE: 128 MMHG

## 2024-02-15 DIAGNOSIS — M54.2 CERVICALGIA: ICD-10-CM

## 2024-02-15 DIAGNOSIS — M79.2 NEURALGIA AND NEURITIS, UNSPECIFIED: ICD-10-CM

## 2024-02-15 PROCEDURE — 99215 OFFICE O/P EST HI 40 MIN: CPT

## 2024-02-15 RX ORDER — GABAPENTIN 300 MG/1
300 CAPSULE ORAL
Qty: 7 | Refills: 0 | Status: DISCONTINUED | COMMUNITY
Start: 2024-01-23 | End: 2024-02-15

## 2024-02-15 RX ORDER — AMITRIPTYLINE HYDROCHLORIDE 10 MG/1
10 TABLET, FILM COATED ORAL
Qty: 30 | Refills: 2 | Status: DISCONTINUED | COMMUNITY
Start: 2023-12-12 | End: 2024-02-15

## 2024-02-15 NOTE — HISTORY OF PRESENT ILLNESS
[FreeTextEntry1] : Pt has been experiencing continued pain in her L shoulder/neck area with stiffness in her neck, also pain in her low back and knees. She notes some improvement with amitriptyline especially at night, she feels that it calms down the pain and makes her more comfortable, but she still experiences significant pain, and also has trouble sleeping and feels very fatigued during the day. She underwent b/l mastectomy on 2/1, having problems with copious drainage from the R breast surgical site. + Anxiety about her health and the future. + Worsening eye problems, pt is having trouble reading.  + Recent diabetes diagnosis. + Very elevated TPO antibodies with normal TSH and T4, pt is following with endocrinology.  Previous HPI: Pt has a h/o cervical spine surgery several years ago. Since around 2021, she has noticed recurrence of pain in her neck, as well as pain in her R>L shoulders, low back and knees, with intermittent knee swelling. She has had imaging which demonstrated areas of advanced OA in her R GH joint, also diffuse degenerative changes in her L-spine and CT c-spine with disc bulging, foraminal narrowing and thecal sac effacement. She underwent a R shoulder replacement but continues to experience pain in her R proximal arm. + Transient R foot drop in the past, unclear what caused it but pt reports intermittently dragging her R foot. Pt saw a neurologist recently and was started on gabapentin but discontinued it after 4 days because she developed fevers. Also tried indomethacin but discontinued it due to GI side effects.  Physical exam: GEN: AAO woman sitting on exam table, intermittently tearful PULM: Clear to auscultation b/l CV: Regular rate and rhythm, no murmurs MSK: Neck: Significantly limited flexion, extension and lateral rotation Shoulders: Full ROM on L, limited ROM on R to 90 degrees Elbows: Full ROM b/l, no effusions Wrists: Full ROM b/l, no effusions Hands: no synovitis Hips: Full ROM b/l Knees: no effusions, full ROM b/l Ankles: no effusions, full ROM b/l Feet: no effusions, no TTP EXT: no LE edema b/l

## 2024-02-15 NOTE — ASSESSMENT
[FreeTextEntry1] : OA: Pt had no specific symptoms or exam findings to suggest an underlying inflammatory process for her chronic pain, and she previously had imaging of her R shoulder, c-spine and L-spine which demonstrated significant degenerative changes. Also s/p b/l knee x-rays which demonstrated tricompartmental OA worst in the medial compartments. Pt is also concerned about a possible metabolic bone process as she also reports h/o multiple injuries and fractures e.g. foot fracture, hand fracture but she had a normal DEXA scan in 2/2023.   - f/u c-spine x-ray. Depending on the results of the c-spine x-ray, will refer pt for MRI c-spine to further evaluate worse pain, stiffness and radiculopathy symptoms - Increase amitriptyline to 25 mg qhs - Referred for PT of neck  f/u in 2 months, will call pt with x-ray and MRI results

## 2024-02-20 ENCOUNTER — APPOINTMENT (OUTPATIENT)
Dept: PLASTIC SURGERY | Facility: CLINIC | Age: 64
End: 2024-02-20
Payer: MEDICAID

## 2024-02-20 PROCEDURE — 99024 POSTOP FOLLOW-UP VISIT: CPT

## 2024-02-20 NOTE — ASSESSMENT
[FreeTextEntry1] : 63 yo F with PMHx o MDD, OCD s/p retroglandular silicone implant augmentation [facility] years ago ([facility]) with symptomatic grade 4 capsular contracture BL breast, likely rupture right breast implant.  Now POD#19 s/p Bilateral ruptured breast implant removal and capsulectomy.    Right breast hematoma s/p needle aspiration 5 cc dark thick clots, now mostly liquified with residual spontaneous drainage.  BL IMF fungal rash resolved  - right breast drain removed, not patent and old blood expressed  - warm compress to R breast - Continue Nystatin powder/cream PRN  - pt reassured - pathology discussed, ruptured implants, capsule - all questions were answered  - f/u next week for hematoma check with Dr. Fitzgerald

## 2024-02-20 NOTE — DATA REVIEWED
[FreeTextEntry1] : [facility] Accession Number : [id number] Patient:     TITO JAIN   Accession:                             [id number]  Collected Date/Time:                   2/1/2024 08:35 EST Received Date/Time:                    2/1/2024 10:11 EST  Surgical Pathology Report - Auth (Verified)  Specimen(s) Submitted 1  Left breast implant 2  Left breast capsule Time obtained: 8:45 am Time in formalin: 8:45 am 3  Right breast implant 4  Right breast capsule Time obtained: 9:14 am Time in formalin: 9:14 am   Final Diagnosis 1.  Breast, left implant, removal: - Ruptured synthetic breast implant (macroscopic diagnosis).  2.  Breast, left tissue, capsulectomy: - Benign fibroadipose connective tissue with dense linear fibrosis associated with coarse stromal calcification, chronic lymphohistiocytic cell inflammation, and silicone granulomata; consistent with a synthetic breast implant soft tissue capsule.  3.  Breast, right implant, removal: - Ruptured synthetic breast implant (macroscopic diagnosis).  4.  Breast, right tissue, capsulectomy: - Benign fibroadipose connective tissue with dense linear fibrosis associated with car stromal calcification, chronic lymphohistiocytic cell inflammation, and silicone granulomata; consistent with a synthetic breast implant soft tissue capsule. Verified by: Cruz Pruett M.D.  (Electronic Signature) Reported on: 02/02/24 14:38 [facility], [place] [address] Phone: [phone]   Fax: [phone] _________________________________________________________________  Clinical Information Bilateral breast ruptured implant, removal and total capsulectomy  Perioperative Diagnosis Bilateral breast ruptured silicone implants  Postoperative Diagnosis  Bilateral breast ruptured silicone implants  Gross Description 1.  The specimen received is labeled "left breast implant".  The specimen consists of a ruptured breast implant and clear gelatinous contents, measuring 13 x 11 x 1 cm and bears the inscription "Silastic 2 to 45 cc". Gross description only. No sections submitted.  2.  The specimen received in formalin is labeled "left breast capsule". The specimen consists of a tan-yellow membranous tissue fragment firm to hard in consistency, measuring 9 x 8 x 2.5 cm. Representative sections are submitted. (1 block)  3.  The specimen received is labeled "right breast implant".  The specimen consists of a ruptured breast implant with yellow gelatinous contents, measuring 10 x 9 x 3 cm and bears the inscription "Silastic 2 245 CC".  Gross description only. No sections submitted.  4.  The specimen received in formalin is labeled "right breast capsule". The specimen consists of a tan-yellow membranous tissue fragment firm to hard in consistency, measuring 9 x 7 x 3 cm. Representative sections are submitted. (1 block)  02/01/2024 12:34:03 EST    OC  Disclaimer In addition to other data that may appear on the specimen containers, all labels have been inspected to confirm the presence of the patient's name  and date of birth.  Specimen was received and underwent gross examination at [facility], [address] [place] [address].

## 2024-02-20 NOTE — PHYSICAL EXAM
[de-identified] : well-appearing, NAD [de-identified] : nonlabored breathing  [de-identified] : Right breast diffuse bruising and swelling significantly improved, nontender, IMF incision healing well, fungal rash resolved, IMF drain with zero output appears to be clogged despite stripping and manipulation with drainage around the drain Left breast soft, nontender, IMF incision with resolved fungal rash, no fluid collection

## 2024-02-20 NOTE — HISTORY OF PRESENT ILLNESS
[FreeTextEntry1] : 62 yo F with PMHx anxiety, OCD, depression GERD s/p retroglandular silicone implant augmentation [facility] years ago ([facility]).  Referred for management of capsular contracture findings on mammogram (9/26/23) and clinically.  Pt has noted change in breast implant shape several years ago as well and increasing breast pain beginning on right breast after right shoulder 2 years ago with subsequent change from round to "odd" shape. Now with increasing left breast pain.  Denies breast masses or nipple discharge or retraction BL.  No implant card or info available.  Here to discuss options for removal of breast implants.  Life partner present during exam.  Current smoker, 2-3 cig per day  Interval hx (11/13/23)  here to discuss breast MRI and tx options.   Here with fiance. No new health issues.  She decided to undergo BL breast implant removal and does not wish to consider breast lift at the same time.    Interval hx (2/8/24) Pt is here POD#7 s/p removal of BL breast implants with capsulectomy c/o redness and pruritis at surgical sites and right breast bruising and swelling. Denies any f/c or bleeding. Reports high right breast drain output in the first few days after surgery now minimal. Drains functional R 15/10 cc daily, Lt 5 cc daily.  Interval hx (2/14/24) Pt is here POD#13 s/p removal of BL breast implants with capsulectomy. Pt still has R drain in place, reports zero output despite stripping daily. Denies f/c/drainage. Using nystatin powder for IMF rash which she states is much improved. Ambulating. Wearing compression bra.   Interval hx (2/20/24) Pt is here POD#19 s/p removal of BL breast implants with capsulectomy with development of right breast hematoma. Doing significantly better. Reports copious drainage through the drain for few days after last appointment currently minimal output in the drain but bleeding around the drain. Denies any f/c. Fungal rash almost completely resolved.

## 2024-02-26 ENCOUNTER — APPOINTMENT (OUTPATIENT)
Dept: PLASTIC SURGERY | Facility: CLINIC | Age: 64
End: 2024-02-26
Payer: MEDICAID

## 2024-02-26 PROCEDURE — 99024 POSTOP FOLLOW-UP VISIT: CPT

## 2024-02-26 NOTE — PHYSICAL EXAM
[de-identified] : well-appearing, NAD [de-identified] : nonlabored breathing  [de-identified] : Right breast diffuse bruising and swelling significantly improved, nontender, IMF incision healing well, fungal rash resolved, right breast with palpable fluid collection s/p needel aspiration 30 ml old blood  Left breast soft, nontender, IMF incision with resolved fungal rash, no fluid collection

## 2024-02-26 NOTE — HISTORY OF PRESENT ILLNESS
[FreeTextEntry1] : 62 yo F with PMHx anxiety, OCD, depression GERD s/p retroglandular silicone implant augmentation [facility] years ago ([facility]).  Referred for management of capsular contracture findings on mammogram (9/26/23) and clinically.  Pt has noted change in breast implant shape several years ago as well and increasing breast pain beginning on right breast after right shoulder 2 years ago with subsequent change from round to "odd" shape. Now with increasing left breast pain.  Denies breast masses or nipple discharge or retraction BL.  No implant card or info available.  Here to discuss options for removal of breast implants.  Life partner present during exam.  Current smoker, 2-3 cig per day  Interval hx (11/13/23)  here to discuss breast MRI and tx options.   Here with fiance. No new health issues.  She decided to undergo BL breast implant removal and does not wish to consider breast lift at the same time.    Interval hx (2/8/24) Pt is here POD#7 s/p removal of BL breast implants with capsulectomy c/o redness and pruritis at surgical sites and right breast bruising and swelling. Denies any f/c or bleeding. Reports high right breast drain output in the first few days after surgery now minimal. Drains functional R 15/10 cc daily, Lt 5 cc daily.  Interval hx (2/14/24) Pt is here POD#13 s/p removal of BL breast implants with capsulectomy. Pt still has R drain in place, reports zero output despite stripping daily. Denies f/c/drainage. Using nystatin powder for IMF rash which she states is much improved. Ambulating. Wearing compression bra.   Interval hx (2/20/24) Pt is here POD#19 s/p removal of BL breast implants with capsulectomy with development of right breast hematoma. Doing significantly better. Reports copious drainage through the drain for few days after last appointment currently minimal output in the drain but bleeding around the drain. Denies any f/c. Fungal rash almost completely resolved.   Interval hx (2/26/24): POD#25 s/p removal of BL breast implants with capsulectomy with development of right breast hematoma.  Still c/o pruritis BL IMF.  Resolved redness with nystatin powder.  Denies fevers, chills.

## 2024-02-26 NOTE — ASSESSMENT
[FreeTextEntry1] : 65 yo F with PMHx o MDD, OCD s/p retroglandular silicone implant augmentation [facility] years ago ([facility]) with symptomatic grade 4 capsular contracture BL breast, likely rupture right breast implant.  Now POD#25 s/p Bilateral ruptured breast implant removal and capsulectomy.    Right breast hematoma s/p needle aspiration 5 cc dark thick clots, now mostly liquified with residual spontaneous drainage.  BL IMF fungal rash resolved s/p needle aspiration 30 ml hematoma  - c/w warm compress and firm massage to R breast - Continue Nystatin powder/cream PRN  - pt reassured - pathology discussed, ruptured implants, capsule - all questions were answered  - f/u next week for hematoma needle aspiration

## 2024-02-26 NOTE — DATA REVIEWED
[FreeTextEntry1] : [facility] Accession Number : [id number] Patient:     TITO JAIN   Accession:                             [id number]  Collected Date/Time:                   2/1/2024 08:35 EST Received Date/Time:                    2/1/2024 10:11 EST  Surgical Pathology Report - Auth (Verified)  Specimen(s) Submitted 1  Left breast implant 2  Left breast capsule Time obtained: 8:45 am Time in formalin: 8:45 am 3  Right breast implant 4  Right breast capsule Time obtained: 9:14 am Time in formalin: 9:14 am   Final Diagnosis 1.  Breast, left implant, removal: - Ruptured synthetic breast implant (macroscopic diagnosis).  2.  Breast, left tissue, capsulectomy: - Benign fibroadipose connective tissue with dense linear fibrosis associated with coarse stromal calcification, chronic lymphohistiocytic cell inflammation, and silicone granulomata; consistent with a synthetic breast implant soft tissue capsule.  3.  Breast, right implant, removal: - Ruptured synthetic breast implant (macroscopic diagnosis).  4.  Breast, right tissue, capsulectomy: - Benign fibroadipose connective tissue with dense linear fibrosis associated with car stromal calcification, chronic lymphohistiocytic cell inflammation, and silicone granulomata; consistent with a synthetic breast implant soft tissue capsule. Verified by: Cruz Pruett M.D.  (Electronic Signature) Reported on: 02/02/24 14:38 [facility], [place] [address] Phone: [phone]   Fax: [phone] _________________________________________________________________  Clinical Information Bilateral breast ruptured implant, removal and total capsulectomy  Perioperative Diagnosis Bilateral breast ruptured silicone implants  Postoperative Diagnosis  Bilateral breast ruptured silicone implants  Gross Description 1.  The specimen received is labeled "left breast implant".  The specimen consists of a ruptured breast implant and clear gelatinous contents, measuring 13 x 11 x 1 cm and bears the inscription "Silastic 2 to 45 cc". Gross description only. No sections submitted.  2.  The specimen received in formalin is labeled "left breast capsule". The specimen consists of a tan-yellow membranous tissue fragment firm to hard in consistency, measuring 9 x 8 x 2.5 cm. Representative sections are submitted. (1 block)  3.  The specimen received is labeled "right breast implant".  The specimen consists of a ruptured breast implant with yellow gelatinous contents, measuring 10 x 9 x 3 cm and bears the inscription "Silastic 2 245 CC".  Gross description only. No sections submitted.  4.  The specimen received in formalin is labeled "right breast capsule". The specimen consists of a tan-yellow membranous tissue fragment firm to hard in consistency, measuring 9 x 7 x 3 cm. Representative sections are submitted. (1 block)  02/01/2024 12:34:03 EST    OC  Disclaimer In addition to other data that may appear on the specimen containers, all labels have been inspected to confirm the presence of the patient's name  and date of birth.  Specimen was received and underwent gross examination at [facility], [address] [place] [address].

## 2024-02-27 ENCOUNTER — NON-APPOINTMENT (OUTPATIENT)
Age: 64
End: 2024-02-27

## 2024-02-27 ENCOUNTER — APPOINTMENT (OUTPATIENT)
Dept: NEUROLOGY | Facility: CLINIC | Age: 64
End: 2024-02-27
Payer: MEDICAID

## 2024-02-27 ENCOUNTER — APPOINTMENT (OUTPATIENT)
Dept: PSYCHIATRY | Facility: CLINIC | Age: 64
End: 2024-02-27
Payer: MEDICAID

## 2024-02-27 ENCOUNTER — OUTPATIENT (OUTPATIENT)
Dept: OUTPATIENT SERVICES | Facility: HOSPITAL | Age: 64
LOS: 1 days | End: 2024-02-27
Payer: MEDICAID

## 2024-02-27 VITALS
HEIGHT: 66 IN | OXYGEN SATURATION: 95 % | WEIGHT: 121 LBS | DIASTOLIC BLOOD PRESSURE: 69 MMHG | SYSTOLIC BLOOD PRESSURE: 107 MMHG | BODY MASS INDEX: 19.44 KG/M2 | HEART RATE: 84 BPM

## 2024-02-27 DIAGNOSIS — Z90.89 ACQUIRED ABSENCE OF OTHER ORGANS: Chronic | ICD-10-CM

## 2024-02-27 DIAGNOSIS — Z98.82 BREAST IMPLANT STATUS: Chronic | ICD-10-CM

## 2024-02-27 DIAGNOSIS — F42.9 OBSESSIVE-COMPULSIVE DISORDER, UNSPECIFIED: ICD-10-CM

## 2024-02-27 DIAGNOSIS — Z96.611 PRESENCE OF RIGHT ARTIFICIAL SHOULDER JOINT: Chronic | ICD-10-CM

## 2024-02-27 DIAGNOSIS — F41.1 GENERALIZED ANXIETY DISORDER: ICD-10-CM

## 2024-02-27 DIAGNOSIS — Z98.890 OTHER SPECIFIED POSTPROCEDURAL STATES: Chronic | ICD-10-CM

## 2024-02-27 DIAGNOSIS — F32.9 MAJOR DEPRESSIVE DISORDER, SINGLE EPISODE, UNSPECIFIED: ICD-10-CM

## 2024-02-27 DIAGNOSIS — Z00.00 ENCOUNTER FOR GENERAL ADULT MEDICAL EXAMINATION WITHOUT ABNORMAL FINDINGS: ICD-10-CM

## 2024-02-27 PROCEDURE — 99213 OFFICE O/P EST LOW 20 MIN: CPT

## 2024-02-27 PROCEDURE — 99214 OFFICE O/P EST MOD 30 MIN: CPT | Mod: 95

## 2024-02-27 RX ORDER — AMITRIPTYLINE HYDROCHLORIDE 50 MG/1
50 TABLET, FILM COATED ORAL
Qty: 30 | Refills: 3 | Status: ACTIVE | COMMUNITY
Start: 2024-02-27 | End: 1900-01-01

## 2024-02-27 NOTE — HISTORY OF PRESENT ILLNESS
[No] : no [FreeTextEntry1] : This is a 64 year old patient who presents for medication management.  The patient reports fairly stable mood, good, sleep and appetite.  The patient denies any symptoms of depression, soco, or psychosis at this time.  The patient relays less anxiety that impairs their daily functioning. The patient denies any suicidal ideation, homicidal ideation, no auditory or visual hallucinations, or paranoid ideation.  The patient has chronic medical complaints. The patient denies any drug or alcohol use, and is not smoking at this time. I requested the patient's updated physical and labs from their PCP.  Coping skills were discussed and a safety plan was provided.  The patient was educated on the risks, benefits, and alternatives of their psychiatric medications.  The patient will not engage in psychotherapy at this time.  The patient consents to ongoing medication management.  Patient reports being at baseline otherwise, support provided. the patient did not want any changes but more klonopin.  Patient was encouraged to seek a second opinion, otherwise will f/u in 1 month.   Patient has been seeing medical specialists. Risks, benefits discussed, see plan.  Patient describes a history of slurred speech, loss of motor coordination, appearing drunk, or impaired that is intermittent.  She often appears this way at visits.  She has been referred to neurology.  Patient maintains that she does not misuse her current medication or drugs, or alcohol.  Patient was educated that increasing klonopin would only increase risk of such episodes and klonopin will not be increased.

## 2024-02-27 NOTE — ASSESSMENT
[FreeTextEntry1] : Patient relays less anxiety related to medical co-morbidities as she made improvement with URO/GYN. She appears much clearer today and has normal speech.  Current medications 1. now on elavil 25mg at bedtime from PMD 2. klonopin 0.5mg po BID 3. hydroxyzine 25mg-75mg at bedtime prn (will no longer use loratadine), not  needed    follow up in 4 weeks

## 2024-02-27 NOTE — CURRENT PSYCHIATRIC SYMPTOMS
[Obsessions] : obsessions [Anhedonia] : no anhedonia [Depressed Mood] : no depressed mood [Decreased Concentration] : no decrease in concentrating ability [Guilt] : not feeling guilty [Insomnia] : no insomnia disorder [Hyperphagia] : no hyperphagia [Hypersomnia] : no ~T hypersomnia [Psychomotor Retardation] : no psychomotor retardation [Anorexia] : no anorexia [Euphoria] : no euphoria [Highly Irritable] : no high irritability [Increased Activity] : no increased in activity [Talkativeness] : no talkativeness [Distractibility] : not distracted [Grandeur] : no feelings of grandeur [Buying Sprees] : no buying sprees [Hypersexuality] : denied hypersexuality [Dec Need For Sleep] : no decreased need for sleep [Delusions] : no ~T delusions [Hallucination Visual] : no visual hallucinations [Hallucination Auditory] : no auditory hallucinations [Hallucination Tactile] : no tactile hallucinations [Thought Disorder] : ~T a thought disorder was not noted [Ruminations] : no rumination disorder [Excessive Worry] : no excessive worries [Re-experiencing] : no re-experiencing [Restlessness] : no restlessness [Hypochondriasis] : no hypochondriasis [Panic] : no panic disorder [de-identified] : concerning medical problems, is seeing providers

## 2024-02-27 NOTE — REASON FOR VISIT
[Telehealth (audio & video) - Individual/Group] : This visit was provided via telehealth using real-time 2-way audio visual technology. [Home] : The patient, [unfilled], was located at home, [unfilled], at the time of the visit. [Medical Office: ([facility])___] : The provider was located at the medical office in [unfilled]. [Verbal consent obtained from patient/other participant(s)] : Verbal consent for telehealth/telephonic services obtained from patient/other participant(s) [Verbal consent obtained from patient] : the patient, [unfilled] [FreeTextEntry4] : 1217pm [FreeTextEntry5] : [id number] [FreeTextEntry2] : 8/23, ISTOP checked [FreeTextEntry1] : "I am good, had surgery, recovering."
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
## 2024-02-27 NOTE — PHYSICAL EXAM
[None] : none [Normal] : good [Fair] : fair [Anxious] : no anxious [Loud] : not loud [Labile] : not labile [Irritable] : no irritable

## 2024-02-28 DIAGNOSIS — M54.50 LOW BACK PAIN, UNSPECIFIED: ICD-10-CM

## 2024-02-28 DIAGNOSIS — F32.9 MAJOR DEPRESSIVE DISORDER, SINGLE EPISODE, UNSPECIFIED: ICD-10-CM

## 2024-02-28 DIAGNOSIS — F41.1 GENERALIZED ANXIETY DISORDER: ICD-10-CM

## 2024-02-28 DIAGNOSIS — F42.9 OBSESSIVE-COMPULSIVE DISORDER, UNSPECIFIED: ICD-10-CM

## 2024-02-28 NOTE — PHYSICAL EXAM
[General Appearance - Alert] : alert [General Appearance - In No Acute Distress] : in no acute distress [General Appearance - Well Nourished] : well nourished [Oriented To Time, Place, And Person] : oriented to person, place, and time [Person] : oriented to person [Place] : oriented to place [Time] : oriented to time [Cranial Nerves Optic (II)] : visual acuity intact bilaterally,  visual fields full to confrontation, pupils equal round and reactive to light [Cranial Nerves Oculomotor (III)] : extraocular motion intact [Cranial Nerves Trigeminal (V)] : facial sensation intact symmetrically [Cranial Nerves Facial (VII)] : face symmetrical [Cranial Nerves Vestibulocochlear (VIII)] : hearing was intact bilaterally [Cranial Nerves Glossopharyngeal (IX)] : tongue and palate midline [Cranial Nerves Accessory (XI - Cranial And Spinal)] : head turning and shoulder shrug symmetric [Cranial Nerves Hypoglossal (XII)] : there was no tongue deviation with protrusion [Motor Tone] : muscle tone was normal in all four extremities [Motor Strength] : muscle strength was normal in all four extremities [Motor Handedness Right-Handed] : the patient is right hand dominant [Sensation Tactile Decrease] : light touch was intact [Abnormal Walk] : normal gait [2+] : Ankle jerk left 2+ [PERRL With Normal Accommodation] : pupils were equal in size, round, reactive to light, with normal accommodation [Extraocular Movements] : extraocular movements were intact [Sensation Pain / Temperature Decrease] : pain and temperature was intact [Paresis Pronator Drift Right-Sided] : no pronator drift on the right [Paresis Pronator Drift Left-Sided] : no pronator drift on the left

## 2024-02-28 NOTE — ASSESSMENT
[FreeTextEntry1] : This is a 62 yo female with a PMHx of Anxiety, Depression, GERD, R leg sciatica (dx 15 years ago), hypothyroidism not on treatment, chronic low back pain (s/p lumbar spine fusion 9-10 years ago), and chronic B/L LE neuropathy of unknown etiology presented to the neurology clinic complaining of groin pain, back pain and difficulty ambulating because of it. She states her groin pain improved but that her back pain has been the same since last visit. She had an EMG/NCS performed in Dec 2023 showing L5 lumbar radiculopathy. Likely this has been the source of her pain.   Plan: - Recommend MRI Lumbar spine to rule out worsening of known chronic lumbar changes was not completed, patient was told she could have an X-Ray first but did not have that performed either - Recommend X-Ray L-spine - EMG/NCS was performed  showed L5 lumbar radiculopathy - There remains no concern for cord compression at this time, based on exam and reported symptoms - Can increase Amitriptyline to 50 mg qhs - Can follow up in clinic in 6 months
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
## 2024-03-04 ENCOUNTER — APPOINTMENT (OUTPATIENT)
Dept: PLASTIC SURGERY | Facility: CLINIC | Age: 64
End: 2024-03-04
Payer: MEDICAID

## 2024-03-04 DIAGNOSIS — T85.43XA LEAKAGE OF BREAST PROSTHESIS AND IMPLANT, INITIAL ENCOUNTER: ICD-10-CM

## 2024-03-04 DIAGNOSIS — N64.89 OTHER SPECIFIED DISORDERS OF BREAST: ICD-10-CM

## 2024-03-04 PROCEDURE — 99024 POSTOP FOLLOW-UP VISIT: CPT

## 2024-03-04 NOTE — HISTORY OF PRESENT ILLNESS
[FreeTextEntry1] : 62 yo F with PMHx anxiety, OCD, depression GERD s/p retroglandular silicone implant augmentation [facility] years ago ([facility]  Referred for management of capsular contracture findings on mammogram (9/26/23) and clinically.  Pt has noted change in breast implant shape several years ago as well and increasing breast pain beginning on right breast after right shoulder 2 years ago with subsequent change from round to "odd" shape. Now with increasing left breast pain.  Denies breast masses or nipple discharge or retraction BL.  No implant card or info available.  Here to discuss options for removal of breast implants.  Life partner present during exam.  Current smoker, 2-3 cig per day  Interval hx (11/13/23)  here to discuss breast MRI and tx options.   Here with fiance. No new health issues.  She decided to undergo BL breast implant removal and does not wish to consider breast lift at the same time.    Interval hx (2/8/24) Pt is here POD#7 s/p removal of BL breast implants with capsulectomy c/o redness and pruritis at surgical sites and right breast bruising and swelling. Denies any f/c or bleeding. Reports high right breast drain output in the first few days after surgery now minimal. Drains functional R 15/10 cc daily, Lt 5 cc daily.  Interval hx (2/14/24) Pt is here POD#13 s/p removal of BL breast implants with capsulectomy. Pt still has R drain in place, reports zero output despite stripping daily. Denies f/c/drainage. Using nystatin powder for IMF rash which she states is much improved. Ambulating. Wearing compression bra.   Interval hx (2/20/24) Pt is here POD#19 s/p removal of BL breast implants with capsulectomy with development of right breast hematoma. Doing significantly better. Reports copious drainage through the drain for few days after last appointment currently minimal output in the drain but bleeding around the drain. Denies any f/c. Fungal rash almost completely resolved.   Interval hx (2/26/24): POD#25 s/p removal of BL breast implants with capsulectomy with development of right breast hematoma.  Still c/o pruritis BL IMF.  Resolved redness with nystatin powder.  Denies fevers, chills.   Interval hx (3/4/24): Pt here POD#32 s/p removal of BL breast implants with capsulectomy with development of right breast hematoma s/p needle aspiration. Doing better but c/o right breast discomfort. BL IMF rash resolved with nystatin powder.  Denies fevers, chills, open wounds or drainage.

## 2024-03-04 NOTE — ASSESSMENT
[FreeTextEntry1] : 65 yo F with PMHx o MDD, OCD s/p retroglandular silicone implant augmentation [facility] years ago ([facility]) with symptomatic grade 4 capsular contracture BL breast, likely rupture right breast implant.  Now POD#32 s/p Bilateral ruptured breast implant removal and capsulectomy.    Right breast hematoma s/p needle aspiration 5 cc dark thick clots then 30 cc hematoma.  BL IMF fungal rash resolved. Needle aspiration attempted today, negative.   - c/w warm compress and firm massage to R breast - IR referral given for image-guided aspiration with drain placement  - pt reassured - pathology discussed, ruptured implants, capsule - all questions were answered  - f/u 1 month after IR drainage.  Seen with Dr. Fitzgerald.

## 2024-03-04 NOTE — DATA REVIEWED
[FreeTextEntry1] : Hira Accession Number : [id number] Patient:     TITO JAIN   Accession:                             [id number]  Collected Date/Time:                   2/1/2024 08:35 EST Received Date/Time:                    2/1/2024 10:11 EST  Surgical Pathology Report - Auth (Verified)  Specimen(s) Submitted 1  Left breast implant 2  Left breast capsule Time obtained: 8:45 am Time in formalin: 8:45 am 3  Right breast implant 4  Right breast capsule Time obtained: 9:14 am Time in formalin: 9:14 am   Final Diagnosis 1.  Breast, left implant, removal: - Ruptured synthetic breast implant (macroscopic diagnosis).  2.  Breast, left tissue, capsulectomy: - Benign fibroadipose connective tissue with dense linear fibrosis associated with coarse stromal calcification, chronic lymphohistiocytic cell inflammation, and silicone granulomata; consistent with a synthetic breast implant soft tissue capsule.  3.  Breast, right implant, removal: - Ruptured synthetic breast implant (macroscopic diagnosis).  4.  Breast, right tissue, capsulectomy: - Benign fibroadipose connective tissue with dense linear fibrosis associated with car stromal calcification, chronic lymphohistiocytic cell inflammation, and silicone granulomata; consistent with a synthetic breast implant soft tissue capsule. Verified by: Cruz Pruett M.D.  (Electronic Signature) Reported on: 02/02/24 14:38 [facility], [place] [address] Phone: [phone]   Fax: [phone] _________________________________________________________________  Clinical Information Bilateral breast ruptured implant, removal and total capsulectomy  Perioperative Diagnosis Bilateral breast ruptured silicone implants  Postoperative Diagnosis  Bilateral breast ruptured silicone implants  Gross Description 1.  The specimen received is labeled "left breast implant".  The specimen consists of a ruptured breast implant and clear gelatinous contents, measuring 13 x 11 x 1 cm and bears the inscription "Silastic 2 to 45 cc". Gross description only. No sections submitted.  2.  The specimen received in formalin is labeled "left breast capsule". The specimen consists of a tan-yellow membranous tissue fragment firm to hard in consistency, measuring 9 x 8 x 2.5 cm. Representative sections are submitted. (1 block)  3.  The specimen received is labeled "right breast implant".  The specimen consists of a ruptured breast implant with yellow gelatinous contents, measuring 10 x 9 x 3 cm and bears the inscription "Silastic 2 245 CC".  Gross description only. No sections submitted.  4.  The specimen received in formalin is labeled "right breast capsule". The specimen consists of a tan-yellow membranous tissue fragment firm to hard in consistency, measuring 9 x 7 x 3 cm. Representative sections are submitted. (1 block)  02/01/2024 12:34:03 EST    OC  Disclaimer In addition to other data that may appear on the specimen containers, all labels have been inspected to confirm the presence of the patient's name  and date of birth.  Specimen was received and underwent gross examination at [facility], [address] [place] [address].

## 2024-03-04 NOTE — PHYSICAL EXAM
[de-identified] : well-appearing, NAD [de-identified] : nonlabored breathing  [de-identified] : Right breast diffuse bruising and swelling now resolved, nontender, IMF incisions healing well, c/d/i, fungal rash resolved, right breast with superior fullness - fluid collection feels loculated and dense  Left breast soft, nontender, IMF incision with resolved fungal rash, no fluid collection or erythema

## 2024-03-12 ENCOUNTER — NON-APPOINTMENT (OUTPATIENT)
Age: 64
End: 2024-03-12

## 2024-03-12 RX ORDER — AMITRIPTYLINE HYDROCHLORIDE 25 MG/1
25 TABLET, FILM COATED ORAL
Qty: 30 | Refills: 2 | Status: DISCONTINUED | COMMUNITY
Start: 2024-02-15 | End: 2024-03-12

## 2024-03-12 RX ORDER — AMITRIPTYLINE HYDROCHLORIDE 50 MG/1
50 TABLET, FILM COATED ORAL
Qty: 30 | Refills: 5 | Status: ACTIVE | COMMUNITY
Start: 2024-03-12 | End: 1900-01-01

## 2024-03-13 RX ORDER — ESTRADIOL 0.1 MG/G
0.1 CREAM VAGINAL
Qty: 1 | Refills: 1 | Status: ACTIVE | COMMUNITY
Start: 2023-10-11 | End: 1900-01-01

## 2024-03-26 ENCOUNTER — OUTPATIENT (OUTPATIENT)
Dept: OUTPATIENT SERVICES | Facility: HOSPITAL | Age: 64
LOS: 1 days | End: 2024-03-26
Payer: MEDICAID

## 2024-03-26 ENCOUNTER — APPOINTMENT (OUTPATIENT)
Dept: PSYCHIATRY | Facility: CLINIC | Age: 64
End: 2024-03-26
Payer: MEDICAID

## 2024-03-26 DIAGNOSIS — Z98.82 BREAST IMPLANT STATUS: Chronic | ICD-10-CM

## 2024-03-26 DIAGNOSIS — Z90.89 ACQUIRED ABSENCE OF OTHER ORGANS: Chronic | ICD-10-CM

## 2024-03-26 DIAGNOSIS — Z98.890 OTHER SPECIFIED POSTPROCEDURAL STATES: Chronic | ICD-10-CM

## 2024-03-26 DIAGNOSIS — F32.9 MAJOR DEPRESSIVE DISORDER, SINGLE EPISODE, UNSPECIFIED: ICD-10-CM

## 2024-03-26 DIAGNOSIS — F42.9 OBSESSIVE-COMPULSIVE DISORDER, UNSPECIFIED: ICD-10-CM

## 2024-03-26 DIAGNOSIS — F41.1 GENERALIZED ANXIETY DISORDER: ICD-10-CM

## 2024-03-26 PROCEDURE — 99214 OFFICE O/P EST MOD 30 MIN: CPT | Mod: 95
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
## 2024-03-26 NOTE — REASON FOR VISIT
[Medical Office: ([facility])___] : The provider was located at the medical office in [unfilled]. [Telehealth (audio & video) - Individual/Group] : This visit was provided via telehealth using real-time 2-way audio visual technology. [Verbal consent obtained from patient/other participant(s)] : Verbal consent for telehealth/telephonic services obtained from patient/other participant(s) [Home] : The patient, [unfilled], was located at home, [unfilled], at the time of the visit. [Verbal consent obtained from patient] : the patient, [unfilled] [FreeTextEntry4] : 5127pm [FreeTextEntry2] : 8/23, ISTOP checked [FreeTextEntry5] : 104pm [FreeTextEntry1] : "I am good, they raised the elavil."

## 2024-03-26 NOTE — CURRENT PSYCHIATRIC SYMPTOMS
[Obsessions] : obsessions [Depressed Mood] : no depressed mood [Anhedonia] : no anhedonia [Guilt] : not feeling guilty [Decreased Concentration] : no decrease in concentrating ability [Hyperphagia] : no hyperphagia [Hypersomnia] : no ~T hypersomnia [Insomnia] : no insomnia disorder [Psychomotor Retardation] : no psychomotor retardation [Euphoria] : no euphoria [Anorexia] : no anorexia [Highly Irritable] : no high irritability [Increased Activity] : no increased in activity [Distractibility] : not distracted [Talkativeness] : no talkativeness [Grandeur] : no feelings of grandeur [Buying Sprees] : no buying sprees [Hypersexuality] : denied hypersexuality [Dec Need For Sleep] : no decreased need for sleep [Hallucination Visual] : no visual hallucinations [Delusions] : no ~T delusions [Hallucination Auditory] : no auditory hallucinations [Hallucination Tactile] : no tactile hallucinations [Thought Disorder] : ~T a thought disorder was not noted [Excessive Worry] : no excessive worries [Ruminations] : no rumination disorder [Re-experiencing] : no re-experiencing [Hypochondriasis] : no hypochondriasis [Restlessness] : no restlessness [Panic] : no panic disorder [de-identified] : concerning medical problems, is seeing providers

## 2024-03-27 DIAGNOSIS — F41.1 GENERALIZED ANXIETY DISORDER: ICD-10-CM

## 2024-03-27 DIAGNOSIS — F32.9 MAJOR DEPRESSIVE DISORDER, SINGLE EPISODE, UNSPECIFIED: ICD-10-CM

## 2024-03-27 DIAGNOSIS — F42.9 OBSESSIVE-COMPULSIVE DISORDER, UNSPECIFIED: ICD-10-CM

## 2024-04-18 ENCOUNTER — APPOINTMENT (OUTPATIENT)
Dept: RHEUMATOLOGY | Facility: CLINIC | Age: 64
End: 2024-04-18

## 2024-04-22 ENCOUNTER — APPOINTMENT (OUTPATIENT)
Dept: PLASTIC SURGERY | Facility: CLINIC | Age: 64
End: 2024-04-22
Payer: MEDICAID

## 2024-04-22 DIAGNOSIS — T85.44XA CAPSULAR CONTRACTURE OF BREAST IMPLANT, INITIAL ENCOUNTER: ICD-10-CM

## 2024-04-22 DIAGNOSIS — N64.4 MASTODYNIA: ICD-10-CM

## 2024-04-22 PROCEDURE — 99024 POSTOP FOLLOW-UP VISIT: CPT

## 2024-04-22 RX ORDER — GABAPENTIN 100 MG/1
100 CAPSULE ORAL
Qty: 60 | Refills: 0 | Status: ACTIVE | COMMUNITY
Start: 2024-04-22 | End: 1900-01-01

## 2024-04-22 NOTE — PHYSICAL EXAM
[de-identified] : well-appearing, NAD [de-identified] : nonlabored breathing  [de-identified] : Right breast nontender, IMF incisions well healed, flat with no HTS, right breast superior pole with mild chage in pigmentation 2/2 chronic fluid collection, no appreciated fluid wave but palpable firmness. Remainder of breast without any overlying skin changes, no erythema/cellulitis. NAC with mild TTP with new R nipple inversion  Left breast soft, nontender, IMF incision with resolved fungal rash, no fluid collection or erythema

## 2024-04-22 NOTE — ASSESSMENT
[FreeTextEntry1] : 65 yo F with PMHx o MDD, OCD s/p retroglandular silicone implant augmentation [facility] years ago ([facility]) with symptomatic grade 4 capsular contracture BL breast, likely rupture right breast implant.  Now 12 weeks s/p Bilateral ruptured breast implant removal and capsulectomy.  +NAC discomfort  Right breast hematoma s/p needle aspiration 5 cc dark thick clots then 30 cc hematoma.  BL IMF fungal rash resolved. Needle aspiration attempted today, 4/22/24, negative.  - c/w warm compress and firm massage to R breast - pt reassured - Rx R breast US: R/o chronic hematoma - pathology discussed, ruptured implants, capsule - s/s of infection reviewed - R NAC discomfort: Rx Gabapentin 100mg BID - Continue compression bra - all questions were answered  - f/u 2-4 weeks after imaging

## 2024-04-22 NOTE — DATA REVIEWED
[FreeTextEntry1] : [facility]ira Accession Number : [id number] Patient:     TITO JAIN   Accession:                             [id number]  Collected Date/Time:                   2/1/2024 08:35 EST Received Date/Time:                    2/1/2024 10:11 EST  Surgical Pathology Report - Auth (Verified)  Specimen(s) Submitted 1  Left breast implant 2  Left breast capsule Time obtained: 8:45 am Time in formalin: 8:45 am 3  Right breast implant 4  Right breast capsule Time obtained: 9:14 am Time in formalin: 9:14 am   Final Diagnosis 1.  Breast, left implant, removal: - Ruptured synthetic breast implant (macroscopic diagnosis).  2.  Breast, left tissue, capsulectomy: - Benign fibroadipose connective tissue with dense linear fibrosis associated with coarse stromal calcification, chronic lymphohistiocytic cell inflammation, and silicone granulomata; consistent with a synthetic breast implant soft tissue capsule.  3.  Breast, right implant, removal: - Ruptured synthetic breast implant (macroscopic diagnosis).  4.  Breast, right tissue, capsulectomy: - Benign fibroadipose connective tissue with dense linear fibrosis associated with car stromal calcification, chronic lymphohistiocytic cell inflammation, and silicone granulomata; consistent with a synthetic breast implant soft tissue capsule. Verified by: Cruz Pruett M.D.  (Electronic Signature) Reported on: 02/02/24 14:38 [facility], [place] [address] Phone: [phone]   Fax: [phone] _________________________________________________________________  Clinical Information Bilateral breast ruptured implant, removal and total capsulectomy  Perioperative Diagnosis Bilateral breast ruptured silicone implants  Postoperative Diagnosis  Bilateral breast ruptured silicone implants  Gross Description 1.  The specimen received is labeled "left breast implant".  The specimen consists of a ruptured breast implant and clear gelatinous contents, measuring 13 x 11 x 1 cm and bears the inscription "Silastic 2 to 45 cc". Gross description only. No sections submitted.  2.  The specimen received in formalin is labeled "left breast capsule". The specimen consists of a tan-yellow membranous tissue fragment firm to hard in consistency, measuring 9 x 8 x 2.5 cm. Representative sections are submitted. (1 block)  3.  The specimen received is labeled "right breast implant".  The specimen consists of a ruptured breast implant with yellow gelatinous contents, measuring 10 x 9 x 3 cm and bears the inscription "Silastic 2 245 CC".  Gross description only. No sections submitted.  4.  The specimen received in formalin is labeled "right breast capsule". The specimen consists of a tan-yellow membranous tissue fragment firm to hard in consistency, measuring 9 x 7 x 3 cm. Representative sections are submitted. (1 block)  02/01/2024 12:34:03 EST    OC  Disclaimer In addition to other data that may appear on the specimen containers, all labels have been inspected to confirm the presence of the patient's name  and date of birth.  Specimen was received and underwent gross examination at [facility], [address] [place] [address].

## 2024-04-22 NOTE — HISTORY OF PRESENT ILLNESS
[FreeTextEntry1] : 62 yo F with PMHx anxiety, OCD, depression GERD s/p retroglandular silicone implant augmentation [facility] years ago ([facility]).  Referred for management of capsular contracture findings on mammogram (9/26/23) and clinically.  Pt has noted change in breast implant shape several years ago as well and increasing breast pain beginning on right breast after right shoulder 2 years ago with subsequent change from round to "odd" shape. Now with increasing left breast pain.  Denies breast masses or nipple discharge or retraction BL.  No implant card or info available.  Here to discuss options for removal of breast implants.  Life partner present during exam.  Current smoker, 2-3 cig per day  Interval hx (11/13/23)  here to discuss breast MRI and tx options.   Here with fiance. No new health issues.  She decided to undergo BL breast implant removal and does not wish to consider breast lift at the same time.    Interval hx (2/8/24) Pt is here POD#7 s/p removal of BL breast implants with capsulectomy c/o redness and pruritis at surgical sites and right breast bruising and swelling. Denies any f/c or bleeding. Reports high right breast drain output in the first few days after surgery now minimal. Drains functional R 15/10 cc daily, Lt 5 cc daily.  Interval hx (2/14/24) Pt is here POD#13 s/p removal of BL breast implants with capsulectomy. Pt still has R drain in place, reports zero output despite stripping daily. Denies f/c/drainage. Using nystatin powder for IMF rash which she states is much improved. Ambulating. Wearing compression bra.   Interval hx (2/20/24) Pt is here POD#19 s/p removal of BL breast implants with capsulectomy with development of right breast hematoma. Doing significantly better. Reports copious drainage through the drain for few days after last appointment currently minimal output in the drain but bleeding around the drain. Denies any f/c. Fungal rash almost completely resolved.   Interval hx (2/26/24): POD#25 s/p removal of BL breast implants with capsulectomy with development of right breast hematoma.  Still c/o pruritis BL IMF.  Resolved redness with nystatin powder.  Denies fevers, chills.   Interval hx (3/4/24): Pt here POD#32 s/p removal of BL breast implants with capsulectomy with development of right breast hematoma s/p needle aspiration. Doing better but c/o right breast discomfort. BL IMF rash resolved with nystatin powder.  Denies fevers, chills, open wounds or drainage.   Interval hx (4/22/24): Pt is now ~12 weeks s/p removal of BL breast implants with capsulectomy with development of right breast hematoma s/p needle aspiration. At last visit, IR referral given for image-guided aspiration with drain placement however pt never went due to difficulty with scheduling. Pt states that she has done daily, agressive massage and heat to the area with dramatic improvement in hematoma, however pt c/w R nipple inversion and pain over NAC. Denies f/c/drainage.

## 2024-04-23 ENCOUNTER — APPOINTMENT (OUTPATIENT)
Dept: PSYCHIATRY | Facility: CLINIC | Age: 64
End: 2024-04-23
Payer: MEDICAID

## 2024-04-23 ENCOUNTER — OUTPATIENT (OUTPATIENT)
Dept: OUTPATIENT SERVICES | Facility: HOSPITAL | Age: 64
LOS: 1 days | End: 2024-04-23
Payer: MEDICAID

## 2024-04-23 DIAGNOSIS — F32.9 MAJOR DEPRESSIVE DISORDER, SINGLE EPISODE, UNSPECIFIED: ICD-10-CM

## 2024-04-23 DIAGNOSIS — F42.9 OBSESSIVE-COMPULSIVE DISORDER, UNSPECIFIED: ICD-10-CM

## 2024-04-23 DIAGNOSIS — Z98.890 OTHER SPECIFIED POSTPROCEDURAL STATES: Chronic | ICD-10-CM

## 2024-04-23 DIAGNOSIS — Z90.89 ACQUIRED ABSENCE OF OTHER ORGANS: Chronic | ICD-10-CM

## 2024-04-23 DIAGNOSIS — F41.1 GENERALIZED ANXIETY DISORDER: ICD-10-CM

## 2024-04-23 DIAGNOSIS — Z98.82 BREAST IMPLANT STATUS: Chronic | ICD-10-CM

## 2024-04-23 PROCEDURE — 99214 OFFICE O/P EST MOD 30 MIN: CPT | Mod: 95

## 2024-04-23 NOTE — REASON FOR VISIT
[Telehealth (audio & video) - Individual/Group] : This visit was provided via telehealth using real-time 2-way audio visual technology. [Medical Office: ([facility])___] : The provider was located at the medical office in [unfilled]. [Home] : The patient, [unfilled], was located at home, [unfilled], at the time of the visit. [Verbal consent obtained from patient/other participant(s)] : Verbal consent for telehealth/telephonic services obtained from patient/other participant(s) [Verbal consent obtained from patient] : the patient, [unfilled] [FreeTextEntry4] : 7796pm [FreeTextEntry5] : 104pm [FreeTextEntry2] : 8/23, ISTOP checked [FreeTextEntry1] : "I am dealing with medical complications."

## 2024-04-23 NOTE — PHYSICAL EXAM
[None] : none [Fair] : fair [Normal] : normal [Loud] : not loud [Anxious] : no anxious [Irritable] : no irritable [Labile] : not labile

## 2024-04-23 NOTE — CURRENT PSYCHIATRIC SYMPTOMS
[Obsessions] : obsessions [Depressed Mood] : no depressed mood [Anhedonia] : no anhedonia [Guilt] : not feeling guilty [Decreased Concentration] : no decrease in concentrating ability [Hyperphagia] : no hyperphagia [Insomnia] : no insomnia disorder [Hypersomnia] : no ~T hypersomnia [Psychomotor Retardation] : no psychomotor retardation [Anorexia] : no anorexia [Euphoria] : no euphoria [Highly Irritable] : no high irritability [Increased Activity] : no increased in activity [Distractibility] : not distracted [Talkativeness] : no talkativeness [Grandeur] : no feelings of grandeur [Buying Sprees] : no buying sprees [Hypersexuality] : denied hypersexuality [Dec Need For Sleep] : no decreased need for sleep [Delusions] : no ~T delusions [Hallucination Visual] : no visual hallucinations [Hallucination Auditory] : no auditory hallucinations [Hallucination Tactile] : no tactile hallucinations [Thought Disorder] : ~T a thought disorder was not noted [Excessive Worry] : no excessive worries [Ruminations] : no rumination disorder [Re-experiencing] : no re-experiencing [Restlessness] : no restlessness [Hypochondriasis] : no hypochondriasis [Panic] : no panic disorder [de-identified] : concerning medical problems, is seeing providers

## 2024-04-23 NOTE — ASSESSMENT
[FreeTextEntry1] : Patient relays less anxiety related to medical co-morbidities as she made improvement with URO/GYN. She appears much clearer today and has normal speech.  Current medications 1. now on elavil 50mg at bedtime from PMD 2. klonopin 0.5mg po BID 3. hydroxyzine 25mg-75mg at bedtime prn (will no longer use loratadine), not  needed    follow up in 4 weeks

## 2024-04-24 DIAGNOSIS — F42.9 OBSESSIVE-COMPULSIVE DISORDER, UNSPECIFIED: ICD-10-CM

## 2024-04-24 DIAGNOSIS — F32.9 MAJOR DEPRESSIVE DISORDER, SINGLE EPISODE, UNSPECIFIED: ICD-10-CM

## 2024-04-24 DIAGNOSIS — F41.1 GENERALIZED ANXIETY DISORDER: ICD-10-CM

## 2024-05-21 ENCOUNTER — APPOINTMENT (OUTPATIENT)
Dept: PSYCHIATRY | Facility: CLINIC | Age: 64
End: 2024-05-21
Payer: MEDICAID

## 2024-05-21 ENCOUNTER — OUTPATIENT (OUTPATIENT)
Dept: OUTPATIENT SERVICES | Facility: HOSPITAL | Age: 64
LOS: 1 days | End: 2024-05-21
Payer: MEDICAID

## 2024-05-21 DIAGNOSIS — F42.9 OBSESSIVE-COMPULSIVE DISORDER, UNSPECIFIED: ICD-10-CM

## 2024-05-21 DIAGNOSIS — Z98.82 BREAST IMPLANT STATUS: Chronic | ICD-10-CM

## 2024-05-21 DIAGNOSIS — Z98.890 OTHER SPECIFIED POSTPROCEDURAL STATES: Chronic | ICD-10-CM

## 2024-05-21 DIAGNOSIS — Z96.611 PRESENCE OF RIGHT ARTIFICIAL SHOULDER JOINT: Chronic | ICD-10-CM

## 2024-05-21 DIAGNOSIS — Z90.89 ACQUIRED ABSENCE OF OTHER ORGANS: Chronic | ICD-10-CM

## 2024-05-21 DIAGNOSIS — F32.9 MAJOR DEPRESSIVE DISORDER, SINGLE EPISODE, UNSPECIFIED: ICD-10-CM

## 2024-05-21 DIAGNOSIS — F41.1 GENERALIZED ANXIETY DISORDER: ICD-10-CM

## 2024-05-21 PROCEDURE — 99214 OFFICE O/P EST MOD 30 MIN: CPT | Mod: 95

## 2024-05-21 NOTE — CURRENT PSYCHIATRIC SYMPTOMS
[Obsessions] : obsessions [Depressed Mood] : no depressed mood [Anhedonia] : no anhedonia [Guilt] : not feeling guilty [Decreased Concentration] : no decrease in concentrating ability [Hyperphagia] : no hyperphagia [Insomnia] : no insomnia disorder [Hypersomnia] : no ~T hypersomnia [Psychomotor Retardation] : no psychomotor retardation [Anorexia] : no anorexia [Euphoria] : no euphoria [Highly Irritable] : no high irritability [Increased Activity] : no increased in activity [Distractibility] : not distracted [Talkativeness] : no talkativeness [Grandeur] : no feelings of grandeur [Buying Sprees] : no buying sprees [Hypersexuality] : denied hypersexuality [Dec Need For Sleep] : no decreased need for sleep [Delusions] : no ~T delusions [Hallucination Visual] : no visual hallucinations [Hallucination Auditory] : no auditory hallucinations [Hallucination Tactile] : no tactile hallucinations [Thought Disorder] : ~T a thought disorder was not noted [Excessive Worry] : no excessive worries [Ruminations] : no rumination disorder [Re-experiencing] : no re-experiencing [Restlessness] : no restlessness [Hypochondriasis] : no hypochondriasis [Panic] : no panic disorder [de-identified] : concerning medical problems, is seeing providers

## 2024-05-21 NOTE — REASON FOR VISIT
[Telehealth (audio & video) - Individual/Group] : This visit was provided via telehealth using real-time 2-way audio visual technology. [Medical Office: ([facility])___] : The provider was located at the medical office in [unfilled]. [Home] : The patient, [unfilled], was located at home, [unfilled], at the time of the visit. [Verbal consent obtained from patient/other participant(s)] : Verbal consent for telehealth/telephonic services obtained from patient/other participant(s) [Verbal consent obtained from patient] : the patient, [unfilled] [FreeTextEntry4] : 6075pm [FreeTextEntry5] : 100pm [FreeTextEntry2] : 8/23, ISTOP checked [FreeTextEntry3] : phone, then video [FreeTextEntry1] : "I am dealing with medical complications, have help and support, but need surgery."

## 2024-05-21 NOTE — PHYSICAL EXAM
[None] : none [Normal] : good [Fair] : fair [Loud] : not loud [Anxious] : no anxious [Irritable] : no irritable [Labile] : not labile [2 - Borderline mentally ill] : 2 - Borderline mentally ill (subtle or suspected pathology) [4 - No change] : 4 - No change  (symptoms remain essentially unchanged)

## 2024-05-22 DIAGNOSIS — F42.9 OBSESSIVE-COMPULSIVE DISORDER, UNSPECIFIED: ICD-10-CM

## 2024-05-22 DIAGNOSIS — F32.9 MAJOR DEPRESSIVE DISORDER, SINGLE EPISODE, UNSPECIFIED: ICD-10-CM

## 2024-05-22 DIAGNOSIS — F41.1 GENERALIZED ANXIETY DISORDER: ICD-10-CM

## 2024-05-22 NOTE — PLAN
Pt father Eliezer is requesting a call back. Eliezer would like to know if the pt can workout just his lower body or do he have to wait for the wrist to heal.    [FreeTextEntry4] : mood is ok\par \par anxiety is chronic, low\par \par ongoing health issues, sees specialists

## 2024-05-23 ENCOUNTER — RESULT REVIEW (OUTPATIENT)
Age: 64
End: 2024-05-23

## 2024-05-23 ENCOUNTER — OUTPATIENT (OUTPATIENT)
Dept: OUTPATIENT SERVICES | Facility: HOSPITAL | Age: 64
LOS: 1 days | End: 2024-05-23
Payer: MEDICAID

## 2024-05-23 DIAGNOSIS — Z98.890 OTHER SPECIFIED POSTPROCEDURAL STATES: Chronic | ICD-10-CM

## 2024-05-23 DIAGNOSIS — Z98.82 BREAST IMPLANT STATUS: Chronic | ICD-10-CM

## 2024-05-23 DIAGNOSIS — Z96.611 PRESENCE OF RIGHT ARTIFICIAL SHOULDER JOINT: Chronic | ICD-10-CM

## 2024-05-23 DIAGNOSIS — R92.8 OTHER ABNORMAL AND INCONCLUSIVE FINDINGS ON DIAGNOSTIC IMAGING OF BREAST: ICD-10-CM

## 2024-05-23 DIAGNOSIS — Z90.89 ACQUIRED ABSENCE OF OTHER ORGANS: Chronic | ICD-10-CM

## 2024-05-23 DIAGNOSIS — Z12.31 ENCOUNTER FOR SCREENING MAMMOGRAM FOR MALIGNANT NEOPLASM OF BREAST: ICD-10-CM

## 2024-05-23 PROCEDURE — 77065 DX MAMMO INCL CAD UNI: CPT | Mod: RT

## 2024-05-23 PROCEDURE — 76641 ULTRASOUND BREAST COMPLETE: CPT | Mod: RT

## 2024-05-23 PROCEDURE — 77061 BREAST TOMOSYNTHESIS UNI: CPT | Mod: 26

## 2024-05-23 PROCEDURE — G0279: CPT

## 2024-05-23 PROCEDURE — G0279: CPT | Mod: 26

## 2024-05-23 PROCEDURE — 76641 ULTRASOUND BREAST COMPLETE: CPT | Mod: 26,RT

## 2024-05-23 PROCEDURE — 77065 DX MAMMO INCL CAD UNI: CPT | Mod: 26,RT

## 2024-05-24 DIAGNOSIS — R92.8 OTHER ABNORMAL AND INCONCLUSIVE FINDINGS ON DIAGNOSTIC IMAGING OF BREAST: ICD-10-CM

## 2024-05-31 ENCOUNTER — APPOINTMENT (OUTPATIENT)
Dept: PLASTIC SURGERY | Facility: CLINIC | Age: 64
End: 2024-05-31
Payer: MEDICAID

## 2024-05-31 PROCEDURE — 99213 OFFICE O/P EST LOW 20 MIN: CPT

## 2024-05-31 NOTE — DATA REVIEWED
[FreeTextEntry1] : [facility]ira Accession Number : [id number] Patient:     TITO AJIN   Accession:                             [id number]  Collected Date/Time:                   2/1/2024 08:35 EST Received Date/Time:                    2/1/2024 10:11 EST  Surgical Pathology Report - Auth (Verified)  Specimen(s) Submitted 1  Left breast implant 2  Left breast capsule Time obtained: 8:45 am Time in formalin: 8:45 am 3  Right breast implant 4  Right breast capsule Time obtained: 9:14 am Time in formalin: 9:14 am   Final Diagnosis 1.  Breast, left implant, removal: - Ruptured synthetic breast implant (macroscopic diagnosis).  2.  Breast, left tissue, capsulectomy: - Benign fibroadipose connective tissue with dense linear fibrosis associated with coarse stromal calcification, chronic lymphohistiocytic cell inflammation, and silicone granulomata; consistent with a synthetic breast implant soft tissue capsule.  3.  Breast, right implant, removal: - Ruptured synthetic breast implant (macroscopic diagnosis).  4.  Breast, right tissue, capsulectomy: - Benign fibroadipose connective tissue with dense linear fibrosis associated with car stromal calcification, chronic lymphohistiocytic cell inflammation, and silicone granulomata; consistent with a synthetic breast implant soft tissue capsule. Verified by: Cruz Pruett M.D.  (Electronic Signature) Reported on: 02/02/24 14:38 [facility], [place] [address] Phone: [phone]   Fax: [phone] _________________________________________________________________  Clinical Information Bilateral breast ruptured implant, removal and total capsulectomy  Perioperative Diagnosis Bilateral breast ruptured silicone implants  Postoperative Diagnosis  Bilateral breast ruptured silicone implants  Gross Description 1.  The specimen received is labeled "left breast implant".  The specimen consists of a ruptured breast implant and clear gelatinous contents, measuring 13 x 11 x 1 cm and bears the inscription "Silastic 2 to 45 cc". Gross description only. No sections submitted.  2.  The specimen received in formalin is labeled "left breast capsule". The specimen consists of a tan-yellow membranous tissue fragment firm to hard in consistency, measuring 9 x 8 x 2.5 cm. Representative sections are submitted. (1 block)  3.  The specimen received is labeled "right breast implant".  The specimen consists of a ruptured breast implant with yellow gelatinous contents, measuring 10 x 9 x 3 cm and bears the inscription "Silastic 2 245 CC".  Gross description only. No sections submitted.  4.  The specimen received in formalin is labeled "right breast capsule". The specimen consists of a tan-yellow membranous tissue fragment firm to hard in consistency, measuring 9 x 7 x 3 cm. Representative sections are submitted. (1 block)  02/01/2024 12:34:03 EST    OC  Disclaimer In addition to other data that may appear on the specimen containers, all labels have been inspected to confirm the presence of the patient's name  and date of birth.  Specimen was received and underwent gross examination at [facility], [address] [place] [address].

## 2024-05-31 NOTE — HISTORY OF PRESENT ILLNESS
[FreeTextEntry1] : 62 yo F with PMHx anxiety, OCD, depression GERD s/p retroglandular silicone implant augmentation [facility] years ago ([facility]).  Referred for management of capsular contracture findings on mammogram (9/26/23) and clinically.  Pt has noted change in breast implant shape several years ago as well and increasing breast pain beginning on right breast after right shoulder 2 years ago with subsequent change from round to "odd" shape. Now with increasing left breast pain.  Denies breast masses or nipple discharge or retraction BL.  No implant card or info available.  Here to discuss options for removal of breast implants.  Life partner present during exam.  Current smoker, 2-3 cig per day  Interval hx (11/13/23)  here to discuss breast MRI and tx options.   Here with fiance. No new health issues.  She decided to undergo BL breast implant removal and does not wish to consider breast lift at the same time.    Interval hx (2/8/24) Pt is here POD#7 s/p removal of BL breast implants with capsulectomy c/o redness and pruritis at surgical sites and right breast bruising and swelling. Denies any f/c or bleeding. Reports high right breast drain output in the first few days after surgery now minimal. Drains functional R 15/10 cc daily, Lt 5 cc daily.  Interval hx (2/14/24) Pt is here POD#13 s/p removal of BL breast implants with capsulectomy. Pt still has R drain in place, reports zero output despite stripping daily. Denies f/c/drainage. Using nystatin powder for IMF rash which she states is much improved. Ambulating. Wearing compression bra.   Interval hx (2/20/24) Pt is here POD#19 s/p removal of BL breast implants with capsulectomy with development of right breast hematoma. Doing significantly better. Reports copious drainage through the drain for few days after last appointment currently minimal output in the drain but bleeding around the drain. Denies any f/c. Fungal rash almost completely resolved.   Interval hx (2/26/24): POD#25 s/p removal of BL breast implants with capsulectomy with development of right breast hematoma.  Still c/o pruritis BL IMF.  Resolved redness with nystatin powder.  Denies fevers, chills.   Interval hx (3/4/24): Pt here POD#32 s/p removal of BL breast implants with capsulectomy with development of right breast hematoma s/p needle aspiration. Doing better but c/o right breast discomfort. BL IMF rash resolved with nystatin powder.  Denies fevers, chills, open wounds or drainage.   Interval hx (4/22/24): Pt is now ~12 weeks s/p removal of BL breast implants with capsulectomy with development of right breast hematoma s/p needle aspiration. At last visit, IR referral given for image-guided aspiration with drain placement however pt never went due to difficulty with scheduling. Pt states that she has done daily, agressive massage and heat to the area with dramatic improvement in hematoma, however pt c/w R nipple inversion and pain over NAC. Denies f/c/drainage.   Interval hx (5/31/24):  17 weeks s/p removal of BL breast implants with capsulectomy with development of right breast hematoma, s/p serial needle aspiration.  Pt underwent mammogram and breast US 5/23/24.  She has new right nipple inversion and persistent pain.  New central distortion with BIRAD4.  Pt has not yet scheduled the recommended stereotactic biopsy right breast as she wishes to discuss with me and desire to travel next month. Still has a lot of right breast pain not improved with Tylenol, motrin and gabapentin.  Pt requesting additional pain medication for biopsy.

## 2024-05-31 NOTE — ASSESSMENT
[FreeTextEntry1] : 65 yo F with PMHx o MDD, OCD s/p retroglandular silicone implant augmentation [facility] years ago ([facility]) with symptomatic grade 4 capsular contracture BL breast, likely rupture right breast implant.  Now 17 weeks s/p Bilateral ruptured breast implant removal and capsulectomy.  +NAC discomfort  Right breast hematoma s/p needle aspiration 5 cc dark thick clots then 30 cc hematoma.  BL IMF fungal rash resolved. Needle aspiration attempted today, 4/22/24, negative. New right nipple inversion and central breast tenderness r/o malignancy BIRADS4 findings vs post-op scar tethering  - c/w warm compress and firm massage to R breast - counseled to schedule biopsy as soon as possible  - pathology discussed, ruptured implants, capsule - s/s of infection reviewed - R NAC discomfort: Rx Gabapentin 100mg BID - Continue compression bra for comfort - all questions were answered  - f/u weeks after stereotactic bx. pt to call with date of procedure for pre-procedure tx for tramadol (3 tabs)

## 2024-05-31 NOTE — PHYSICAL EXAM
[de-identified] : well-appearing, NAD [de-identified] : nonlabored breathing  [de-identified] : Right breast nontender, IMF incisions well healed, flat with no HTS, right breast superior pole improved contour, hematoma resolved. Central tenderness and new right nipple inversion and TTP Left breast soft, nontender, IMF incision with resolved fungal rash, no fluid collection or erythema

## 2024-06-04 DIAGNOSIS — R92.8 OTHER ABNORMAL AND INCONCLUSIVE FINDINGS ON DIAGNOSTIC IMAGING OF BREAST: ICD-10-CM

## 2024-06-24 ENCOUNTER — OUTPATIENT (OUTPATIENT)
Dept: OUTPATIENT SERVICES | Facility: HOSPITAL | Age: 64
LOS: 1 days | End: 2024-06-24
Payer: MEDICAID

## 2024-06-24 ENCOUNTER — APPOINTMENT (OUTPATIENT)
Dept: PSYCHIATRY | Facility: CLINIC | Age: 64
End: 2024-06-24
Payer: MEDICAID

## 2024-06-24 DIAGNOSIS — Z98.890 OTHER SPECIFIED POSTPROCEDURAL STATES: Chronic | ICD-10-CM

## 2024-06-24 DIAGNOSIS — Z98.82 BREAST IMPLANT STATUS: Chronic | ICD-10-CM

## 2024-06-24 DIAGNOSIS — F42.9 OBSESSIVE-COMPULSIVE DISORDER, UNSPECIFIED: ICD-10-CM

## 2024-06-24 DIAGNOSIS — F41.1 GENERALIZED ANXIETY DISORDER: ICD-10-CM

## 2024-06-24 DIAGNOSIS — F32.9 MAJOR DEPRESSIVE DISORDER, SINGLE EPISODE, UNSPECIFIED: ICD-10-CM

## 2024-06-24 DIAGNOSIS — Z90.89 ACQUIRED ABSENCE OF OTHER ORGANS: Chronic | ICD-10-CM

## 2024-06-24 DIAGNOSIS — Z96.611 PRESENCE OF RIGHT ARTIFICIAL SHOULDER JOINT: Chronic | ICD-10-CM

## 2024-06-24 PROCEDURE — 99214 OFFICE O/P EST MOD 30 MIN: CPT | Mod: 95

## 2024-06-24 RX ORDER — CLONAZEPAM 0.5 MG/1
0.5 TABLET ORAL TWICE DAILY
Qty: 60 | Refills: 0 | Status: ACTIVE | COMMUNITY
Start: 2024-02-27 | End: 1900-01-01

## 2024-06-25 DIAGNOSIS — F41.1 GENERALIZED ANXIETY DISORDER: ICD-10-CM

## 2024-06-25 DIAGNOSIS — F42.9 OBSESSIVE-COMPULSIVE DISORDER, UNSPECIFIED: ICD-10-CM

## 2024-06-25 DIAGNOSIS — F32.9 MAJOR DEPRESSIVE DISORDER, SINGLE EPISODE, UNSPECIFIED: ICD-10-CM

## 2024-06-26 RX ORDER — TRAMADOL HYDROCHLORIDE 50 MG/1
50 TABLET, COATED ORAL AT BEDTIME
Qty: 3 | Refills: 0 | Status: ACTIVE | COMMUNITY
Start: 2024-06-26 | End: 1900-01-01

## 2024-06-27 ENCOUNTER — EMERGENCY (EMERGENCY)
Facility: HOSPITAL | Age: 64
LOS: 0 days | Discharge: LEFT BEFORE DISCHARGE | End: 2024-06-27
Attending: EMERGENCY MEDICINE
Payer: MEDICAID

## 2024-06-27 VITALS
WEIGHT: 139.99 LBS | SYSTOLIC BLOOD PRESSURE: 115 MMHG | HEART RATE: 74 BPM | TEMPERATURE: 98 F | RESPIRATION RATE: 16 BRPM | OXYGEN SATURATION: 100 % | DIASTOLIC BLOOD PRESSURE: 78 MMHG

## 2024-06-27 DIAGNOSIS — Z98.82 BREAST IMPLANT STATUS: Chronic | ICD-10-CM

## 2024-06-27 DIAGNOSIS — Z98.890 OTHER SPECIFIED POSTPROCEDURAL STATES: Chronic | ICD-10-CM

## 2024-06-27 DIAGNOSIS — Z53.21 PROCEDURE AND TREATMENT NOT CARRIED OUT DUE TO PATIENT LEAVING PRIOR TO BEING SEEN BY HEALTH CARE PROVIDER: ICD-10-CM

## 2024-06-27 DIAGNOSIS — Z90.89 ACQUIRED ABSENCE OF OTHER ORGANS: Chronic | ICD-10-CM

## 2024-06-27 DIAGNOSIS — Z96.611 PRESENCE OF RIGHT ARTIFICIAL SHOULDER JOINT: Chronic | ICD-10-CM

## 2024-06-27 PROCEDURE — L9991: CPT

## 2024-06-27 NOTE — ED ADULT TRIAGE NOTE - EXPLANATION OF PATIENT'S REASON FOR LEAVING
did not want to stay, patient ambulating with steady gait, A & O X 4, denies SI/HI, advised to stay but refused, patient walked out of ED

## 2024-06-28 ENCOUNTER — RESULT REVIEW (OUTPATIENT)
Age: 64
End: 2024-06-28

## 2024-06-28 ENCOUNTER — OUTPATIENT (OUTPATIENT)
Dept: OUTPATIENT SERVICES | Facility: HOSPITAL | Age: 64
LOS: 1 days | End: 2024-06-28
Payer: MEDICAID

## 2024-06-28 ENCOUNTER — APPOINTMENT (OUTPATIENT)
Age: 64
End: 2024-06-28
Payer: MEDICAID

## 2024-06-28 DIAGNOSIS — Z98.890 OTHER SPECIFIED POSTPROCEDURAL STATES: Chronic | ICD-10-CM

## 2024-06-28 DIAGNOSIS — Z96.611 PRESENCE OF RIGHT ARTIFICIAL SHOULDER JOINT: Chronic | ICD-10-CM

## 2024-06-28 DIAGNOSIS — Z90.89 ACQUIRED ABSENCE OF OTHER ORGANS: Chronic | ICD-10-CM

## 2024-06-28 DIAGNOSIS — R92.8 OTHER ABNORMAL AND INCONCLUSIVE FINDINGS ON DIAGNOSTIC IMAGING OF BREAST: ICD-10-CM

## 2024-06-28 PROCEDURE — 88305 TISSUE EXAM BY PATHOLOGIST: CPT

## 2024-06-28 PROCEDURE — 19081 BX BREAST 1ST LESION STRTCTC: CPT | Mod: RT

## 2024-06-28 PROCEDURE — 88305 TISSUE EXAM BY PATHOLOGIST: CPT | Mod: 26

## 2024-06-28 PROCEDURE — A4648: CPT

## 2024-07-01 LAB — SURGICAL PATHOLOGY STUDY: SIGNIFICANT CHANGE UP

## 2024-07-02 ENCOUNTER — EMERGENCY (EMERGENCY)
Facility: HOSPITAL | Age: 64
LOS: 0 days | Discharge: HOME | End: 2024-07-02
Attending: EMERGENCY MEDICINE
Payer: MEDICAID

## 2024-07-02 VITALS
SYSTOLIC BLOOD PRESSURE: 153 MMHG | WEIGHT: 119.93 LBS | DIASTOLIC BLOOD PRESSURE: 70 MMHG | HEART RATE: 61 BPM | HEIGHT: 66 IN | TEMPERATURE: 99 F | RESPIRATION RATE: 96 BRPM | OXYGEN SATURATION: 96 %

## 2024-07-02 DIAGNOSIS — Z98.890 OTHER SPECIFIED POSTPROCEDURAL STATES: Chronic | ICD-10-CM

## 2024-07-02 DIAGNOSIS — Z96.611 PRESENCE OF RIGHT ARTIFICIAL SHOULDER JOINT: Chronic | ICD-10-CM

## 2024-07-02 DIAGNOSIS — S20.20XA CONTUSION OF THORAX, UNSPECIFIED, INITIAL ENCOUNTER: ICD-10-CM

## 2024-07-02 DIAGNOSIS — I71.21 ANEURYSM OF THE ASCENDING AORTA, WITHOUT RUPTURE: ICD-10-CM
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
## 2024-07-02 DIAGNOSIS — R91.1 SOLITARY PULMONARY NODULE: ICD-10-CM

## 2024-07-02 DIAGNOSIS — Z98.82 BREAST IMPLANT STATUS: Chronic | ICD-10-CM
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
## 2024-07-02 DIAGNOSIS — Z90.89 ACQUIRED ABSENCE OF OTHER ORGANS: Chronic | ICD-10-CM
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
## 2024-07-02 PROCEDURE — 99284 EMERGENCY DEPT VISIT MOD MDM: CPT

## 2024-07-02 PROCEDURE — 73030 X-RAY EXAM OF SHOULDER: CPT | Mod: 26,RT

## 2024-07-02 PROCEDURE — 73030 X-RAY EXAM OF SHOULDER: CPT | Mod: RT

## 2024-07-02 PROCEDURE — 71250 CT THORAX DX C-: CPT | Mod: 26,MC

## 2024-07-02 PROCEDURE — 99284 EMERGENCY DEPT VISIT MOD MDM: CPT | Mod: 25

## 2024-07-02 PROCEDURE — 71250 CT THORAX DX C-: CPT | Mod: MC

## 2024-07-08 ENCOUNTER — APPOINTMENT (OUTPATIENT)
Dept: PLASTIC SURGERY | Facility: CLINIC | Age: 64
End: 2024-07-08

## 2024-07-08 DIAGNOSIS — N64.89 OTHER SPECIFIED DISORDERS OF BREAST: ICD-10-CM

## 2024-07-08 DIAGNOSIS — R92.1 MAMMOGRAPHIC CALCIFICATION FOUND ON DIAGNOSTIC IMAGING OF BREAST: ICD-10-CM

## 2024-07-22 ENCOUNTER — APPOINTMENT (OUTPATIENT)
Dept: PSYCHIATRY | Facility: CLINIC | Age: 64
End: 2024-07-22
Payer: MEDICAID

## 2024-07-22 DIAGNOSIS — F32.9 MAJOR DEPRESSIVE DISORDER, SINGLE EPISODE, UNSPECIFIED: ICD-10-CM

## 2024-07-22 DIAGNOSIS — F42.9 OBSESSIVE-COMPULSIVE DISORDER, UNSPECIFIED: ICD-10-CM

## 2024-07-22 DIAGNOSIS — F41.1 GENERALIZED ANXIETY DISORDER: ICD-10-CM

## 2024-07-22 PROCEDURE — 99214 OFFICE O/P EST MOD 30 MIN: CPT | Mod: 95

## 2024-07-22 RX ORDER — AMITRIPTYLINE HYDROCHLORIDE 10 MG/1
10 TABLET, FILM COATED ORAL AS DIRECTED
Qty: 60 | Refills: 0 | Status: ACTIVE | COMMUNITY
Start: 2024-07-22 | End: 1900-01-01

## 2024-07-22 NOTE — CURRENT PSYCHIATRIC SYMPTOMS
[Obsessions] : obsessions [Depressed Mood] : no depressed mood [Anhedonia] : no anhedonia [Guilt] : not feeling guilty [Decreased Concentration] : no decrease in concentrating ability [Hyperphagia] : no hyperphagia [Insomnia] : no insomnia disorder [Hypersomnia] : no ~T hypersomnia [Psychomotor Retardation] : no psychomotor retardation [Anorexia] : no anorexia [Euphoria] : no euphoria [Highly Irritable] : no high irritability [Increased Activity] : no increased in activity [Distractibility] : not distracted [Talkativeness] : no talkativeness [Grandeur] : no feelings of grandeur [Buying Sprees] : no buying sprees [Hypersexuality] : denied hypersexuality [Dec Need For Sleep] : no decreased need for sleep [Delusions] : no ~T delusions [Hallucination Visual] : no visual hallucinations [Hallucination Auditory] : no auditory hallucinations [Hallucination Tactile] : no tactile hallucinations [Thought Disorder] : ~T a thought disorder was not noted [Excessive Worry] : no excessive worries [Ruminations] : no rumination disorder [Re-experiencing] : no re-experiencing [Restlessness] : no restlessness [Hypochondriasis] : no hypochondriasis [Panic] : no panic disorder [de-identified] : concerning medical problems, is seeing providers

## 2024-07-22 NOTE — PHYSICAL EXAM
[None] : none [Normal] : good [Fair] : fair [2 - Borderline mentally ill] : 2 - Borderline mentally ill (subtle or suspected pathology) [4 - No change] : 4 - No change  (symptoms remain essentially unchanged) [Loud] : not loud [Anxious] : no anxious [Irritable] : no irritable [Labile] : not labile

## 2024-07-22 NOTE — DISCUSSION/SUMMARY
[Date of Last Physical Exam: _____] : Date of Last Physical Exam: [unfilled] [Date of Last Annual Labs: _____] : Date of Last Annual Labs: [unfilled] [Annual Review of Systems Completed?] : Annual Review of Systems Completed: Yes [Tobacco Screening Completed?] : Tobacco Screening Completed: Yes [Date of Last HbgA1c: _____] : Date of Last HbgA1c: [unfilled] [Date of Last Lipid Profile: _____] : Date of Last Lipid Profile: [unfilled] [Potential impact of patient’s physical health conditions on psychiatric care?] : Potential impact of patient's physical health conditions on psychiatric care: Yes [Does patient require any additional health services or referrals?] : Does patient require any additional health services or referrals: No [FreeTextEntry1] : requested update

## 2024-07-22 NOTE — REASON FOR VISIT
[Telehealth (audio & video) - Individual/Group] : This visit was provided via telehealth using real-time 2-way audio visual technology. [Medical Office: ([facility])___] : The provider was located at the medical office in [unfilled]. [Home] : The patient, [unfilled], was located at home, [unfilled], at the time of the visit. [Verbal consent obtained from patient/other participant(s)] : Verbal consent for telehealth/telephonic services obtained from patient/other participant(s) [Verbal consent obtained from patient] : the patient, [unfilled] [FreeTextEntry4] : 1200pm [FreeTextEntry5] : 121pm [FreeTextEntry2] : 8/23, ISTOP checked [FreeTextEntry3] : phone, then video [FreeTextEntry1] : "I am struggling with physical problems."

## 2024-07-22 NOTE — ASSESSMENT
[FreeTextEntry1] : Patient relays less anxiety related to medical co-morbidities as she made improvement with URO/GYN. She appears much clearer today and has normal speech.  Current medications 1. d/c elavil 50mg at bedtime from PMD, resume elavil 10mg at bedtime 2. klonopin 0.5mg po BID 3. hydroxyzine 25mg-75mg at bedtime prn (will no longer use loratadine), not  needed    follow up in 4 weeks
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
## 2024-08-16 ENCOUNTER — APPOINTMENT (OUTPATIENT)
Dept: PSYCHIATRY | Facility: CLINIC | Age: 64
End: 2024-08-16
Payer: MEDICAID

## 2024-08-16 DIAGNOSIS — F32.9 MAJOR DEPRESSIVE DISORDER, SINGLE EPISODE, UNSPECIFIED: ICD-10-CM

## 2024-08-16 DIAGNOSIS — F42.9 OBSESSIVE-COMPULSIVE DISORDER, UNSPECIFIED: ICD-10-CM

## 2024-08-16 DIAGNOSIS — F41.1 GENERALIZED ANXIETY DISORDER: ICD-10-CM

## 2024-08-16 PROCEDURE — 99214 OFFICE O/P EST MOD 30 MIN: CPT | Mod: 95

## 2024-08-16 NOTE — DISCUSSION/SUMMARY
[Date of Last Physical Exam: _____] : Date of Last Physical Exam: [unfilled] [Date of Last Annual Labs: _____] : Date of Last Annual Labs: [unfilled] [Annual Review of Systems Completed?] : Annual Review of Systems Completed: Yes [Tobacco Screening Completed?] : Tobacco Screening Completed: Yes [Date of Last HbgA1c: _____] : Date of Last HbgA1c: [unfilled] [Date of Last Lipid Profile: _____] : Date of Last Lipid Profile: [unfilled] [Potential impact of patient’s physical health conditions on psychiatric care?] : Potential impact of patient's physical health conditions on psychiatric care: Yes [Does patient require any additional health services or referrals?] : Does patient require any additional health services or referrals: No [Able to manage surrounding demands and opportunities] : able to manage surrounding demands and opportunities [Able to exercise self-direction] : able to exercise self-direction [Able to set and pursue goals] : able to set and pursue goals [Adherent to treatment recommendations] : adherent to treatment recommendations [Articulate] : articulate [Attempting to realize their potential] : Attempting to realize their potential [Awareness of substance use issues] : awareness of substance use issues [Cognitively intact] : cognitively intact [Good impulse control] : good impulse control [Insightful] : insightful [Creative] : creative [Intelligent] : intelligent [Motivated to participate in treatment] : motivated to participate in treatment [Motivated and ready for change] : motivated and ready for change [Health literacy] : health literacy [Motivated to maintain or improve physical health] : motivated to maintain or improve physical health [In good health] : in good health [Housing stability] : housing stability [English fluency] : English fluency [Connected to healthcare] : connected to healthcare [Access to safe outdoor spaces] : access to safe outdoor spaces [Social supports] : social supports [FreeTextEntry2] : 4/15/25 [FreeTextEntry3] : see chart [FreeTextEntry5] : stable mood, free of anxiety, see progress notes plans for updates each visit [Mental Health] : Mental Health [Physical Health] : Physical Health [Other rationale for transition/discharge:] : Other rationale for transition/discharge: [No - Explain] : No [None - Reason others did not participate:] : None - Reason others did not participate:  [Psychiatric Provider/Prescriber] : Psychiatric Provider/Prescriber [FreeTextEntry1] : see above [de-identified] : chronic condition

## 2024-08-16 NOTE — REASON FOR VISIT
[Telehealth (audio & video) - Individual/Group] : This visit was provided via telehealth using real-time 2-way audio visual technology. [Medical Office: ([facility])___] : The provider was located at the medical office in [unfilled]. [Home] : The patient, [unfilled], was located at home, [unfilled], at the time of the visit. [Verbal consent obtained from patient/other participant(s)] : Verbal consent for telehealth/telephonic services obtained from patient/other participant(s) [Verbal consent obtained from patient] : the patient, [unfilled] [FreeTextEntry4] : 930am [FreeTextEnzif5] : 523fc [FreeTextEntry2] : 8/23, ISTOP checked [FreeTextEntry3] : phone, then video [FreeTextEntry1] : "I am still struggling with physical problems."

## 2024-08-16 NOTE — CURRENT PSYCHIATRIC SYMPTOMS
[Obsessions] : obsessions [Depressed Mood] : no depressed mood [Anhedonia] : no anhedonia [Guilt] : not feeling guilty [Decreased Concentration] : no decrease in concentrating ability [Hyperphagia] : no hyperphagia [Insomnia] : no insomnia disorder [Hypersomnia] : no ~T hypersomnia [Psychomotor Retardation] : no psychomotor retardation [Anorexia] : no anorexia [Euphoria] : no euphoria [Highly Irritable] : no high irritability [Increased Activity] : no increased in activity [Distractibility] : not distracted [Talkativeness] : no talkativeness [Grandeur] : no feelings of grandeur [Buying Sprees] : no buying sprees [Hypersexuality] : denied hypersexuality [Dec Need For Sleep] : no decreased need for sleep [Delusions] : no ~T delusions [Hallucination Visual] : no visual hallucinations [Hallucination Auditory] : no auditory hallucinations [Hallucination Tactile] : no tactile hallucinations [Thought Disorder] : ~T a thought disorder was not noted [Excessive Worry] : no excessive worries [Ruminations] : no rumination disorder [Re-experiencing] : no re-experiencing [Restlessness] : no restlessness [Hypochondriasis] : no hypochondriasis [Panic] : no panic disorder [de-identified] : concerning medical problems, is seeing providers

## 2024-08-16 NOTE — ASSESSMENT
[FreeTextEntry1] : Patient relays less anxiety related to medical co-morbidities as she made improvement with URO/GYN. She appears much clearer today and has normal speech.  Current medications 1.  elavil 10mg at bedtime 2. klonopin 0.5mg po BID 3. hydroxyzine 25mg-75mg at bedtime prn (will no longer use loratadine), not  needed    follow up in 4 weeks

## 2024-08-16 NOTE — REASON FOR VISIT
[Telehealth (audio & video) - Individual/Group] : This visit was provided via telehealth using real-time 2-way audio visual technology. [Medical Office: ([facility])___] : The provider was located at the medical office in [unfilled]. [Home] : The patient, [unfilled], was located at home, [unfilled], at the time of the visit. [Verbal consent obtained from patient/other participant(s)] : Verbal consent for telehealth/telephonic services obtained from patient/other participant(s) [Verbal consent obtained from patient] : the patient, [unfilled] [FreeTextEntry4] : 930am [FreeTextEntrm5] : 369rb [FreeTextEntry2] : 8/23, ISTOP checked [FreeTextEntry3] : phone, then video [FreeTextEntry1] : "I am still struggling with physical problems."

## 2024-08-16 NOTE — CURRENT PSYCHIATRIC SYMPTOMS
[Obsessions] : obsessions [Depressed Mood] : no depressed mood [Anhedonia] : no anhedonia [Guilt] : not feeling guilty [Decreased Concentration] : no decrease in concentrating ability [Hyperphagia] : no hyperphagia [Insomnia] : no insomnia disorder [Hypersomnia] : no ~T hypersomnia [Psychomotor Retardation] : no psychomotor retardation [Anorexia] : no anorexia [Euphoria] : no euphoria [Highly Irritable] : no high irritability [Increased Activity] : no increased in activity [Distractibility] : not distracted [Talkativeness] : no talkativeness [Grandeur] : no feelings of grandeur [Buying Sprees] : no buying sprees [Hypersexuality] : denied hypersexuality [Dec Need For Sleep] : no decreased need for sleep [Delusions] : no ~T delusions [Hallucination Visual] : no visual hallucinations [Hallucination Auditory] : no auditory hallucinations [Hallucination Tactile] : no tactile hallucinations [Thought Disorder] : ~T a thought disorder was not noted [Excessive Worry] : no excessive worries [Ruminations] : no rumination disorder [Re-experiencing] : no re-experiencing [Restlessness] : no restlessness [Hypochondriasis] : no hypochondriasis [Panic] : no panic disorder [de-identified] : concerning medical problems, is seeing providers

## 2024-08-27 ENCOUNTER — APPOINTMENT (OUTPATIENT)
Dept: NEUROLOGY | Facility: CLINIC | Age: 64
End: 2024-08-27
Payer: MEDICAID

## 2024-08-27 ENCOUNTER — OUTPATIENT (OUTPATIENT)
Dept: OUTPATIENT SERVICES | Facility: HOSPITAL | Age: 64
LOS: 1 days | End: 2024-08-27
Payer: MEDICAID

## 2024-08-27 VITALS
HEART RATE: 81 BPM | WEIGHT: 122 LBS | SYSTOLIC BLOOD PRESSURE: 104 MMHG | OXYGEN SATURATION: 96 % | BODY MASS INDEX: 19.69 KG/M2 | DIASTOLIC BLOOD PRESSURE: 69 MMHG

## 2024-08-27 DIAGNOSIS — Z90.89 ACQUIRED ABSENCE OF OTHER ORGANS: Chronic | ICD-10-CM

## 2024-08-27 DIAGNOSIS — Z96.611 PRESENCE OF RIGHT ARTIFICIAL SHOULDER JOINT: Chronic | ICD-10-CM

## 2024-08-27 DIAGNOSIS — M54.30 SCIATICA, UNSPECIFIED SIDE: ICD-10-CM

## 2024-08-27 DIAGNOSIS — Z98.890 OTHER SPECIFIED POSTPROCEDURAL STATES: Chronic | ICD-10-CM

## 2024-08-27 DIAGNOSIS — Z98.82 BREAST IMPLANT STATUS: Chronic | ICD-10-CM

## 2024-08-27 DIAGNOSIS — Z00.00 ENCOUNTER FOR GENERAL ADULT MEDICAL EXAMINATION WITHOUT ABNORMAL FINDINGS: ICD-10-CM

## 2024-08-27 PROCEDURE — 99213 OFFICE O/P EST LOW 20 MIN: CPT

## 2024-08-28 DIAGNOSIS — M54.30 SCIATICA, UNSPECIFIED SIDE: ICD-10-CM

## 2024-08-28 NOTE — HISTORY OF PRESENT ILLNESS
[FreeTextEntry1] : 65 y/o female with a PMHX of Anxiety, Depression, GERD, L>R leg sciatica (R dx 15 years ago), hypothyroidism not on any treatment, chronic low back pain (s/p lumbar spine fusion 9-10 years ago), and chronic b/l LE neuropathy of unknown etiology, was referred to the neurology clinic by her PCP due to difficulties ambulating. She is here for a follow up visit. According to the patient about 1.5 years ago she started feeling intense "groin pain" that was limiting her ambulation due to pain. She went to see her OBGYN who started her on an estrogen cream, which improved her groin pain but did not fully resolve it completely. Patient reported falls at that time after her legs "gave out" and resulted in a blow to the head with head trauma. She occasionally feels weakness or spasms in her legs which portend a fall, and she has to grab onto something or sit down.    EMG/NCS testing was completed in December 2023 showing L5 lumbar radiculopathy.  Lumbar X-ray performed March 2024 showing mild disc space narrowing at L4-5 and bilateral facet arthropathy at L4-5 and L5-S1, left greater than right.  Since her previous visit, she has followed up with pain management (scheduled for an L5 nerve block September 10), and she is planned for a potential procedure with spine surgery in March 2025.  She has tried gabapentin in the past but could not tolerate the side-effects (felt burning, feverish).  Has tried higher dose amitriptyline (up to 50mg qHS) but felt this did not help at all; she does occasionally take 10-20mg which calms down her discomfort somewhat.  Ibuprofen/Tylenol work best for her but she has had GI issues as a result and only takes them rarely.  She has attempted physical therapy but was not able to tolerate extensive workouts.  She is very interested and willing to try more passive physical therapy with stretching and massages and feels this will help her best; she is also interested in trying acupuncture as this has helped her family members with similar issues.

## 2024-08-28 NOTE — ASSESSMENT
[FreeTextEntry1] : 63 yo female with a PMHx of Anxiety, Depression, GERD, L>R leg sciatica (dx 15 years ago), hypothyroidism not on treatment, chronic low back pain (s/p lumbar spine fusion 9-10 years ago), and chronic B/L LE neuropathy, following up for pain resulting from L5 lumbar radiculopathy.    She is following with pain management and spine surgeons for further intervention.  Planned for L5 nerve block on September 10 and spine surgery evaluation in March 2025.  She is interested in trying further PT and acupuncture as a bridge to and in conjunction with upcoming interventions.  Plan: - PT referral provided - Acupuncture referral provided - patient counseled on importance of physical therapy in conjunction with interventions for maximal chance of symptom improvement - c/w amitriptyline 10-20mg as needed, could not tolerate higher dose - Can follow up in clinic in 6 months

## 2024-08-28 NOTE — PHYSICAL EXAM
[FreeTextEntry1] : Neurologic:   Orientation: oriented to person, oriented to place, and oriented to time.   Cranial Nerves: visual acuity intact bilaterally, visual fields full to confrontation, pupils equal round and reactive to light, extraocular motion intact, facial sensation intact symmetrically, face symmetrical, hearing was intact bilaterally, tongue and palate midline, head turning and shoulder shrug symmetric and there was no tongue deviation with protrusion.   Motor: 5/5 bl UE; 5/5 RLE, 4-/5 LLE but with some effort dependence due to discomfort; muscle tone was normal in all four extremities. Sensory exam: LT mildly diminished on L anterior shin, vibration diminished from b/l knees down. Coordination: intact FTN  Gait: intact gait, Rhomberg negative, difficulty with tandem walk (feels knees buckling). Deep tendon reflexes: Biceps right 2+. Biceps left 2+.  Triceps right 2+. Triceps left 2+.  Brachioradialis right 2+. Brachioradialis left 2+.  Patella right 2+. Patella left 2+.  Ankle jerk right 2+. Ankle jerk left 2+.

## 2024-08-28 NOTE — ASSESSMENT
[FreeTextEntry1] : 65 yo female with a PMHx of Anxiety, Depression, GERD, L>R leg sciatica (dx 15 years ago), hypothyroidism not on treatment, chronic low back pain (s/p lumbar spine fusion 9-10 years ago), and chronic B/L LE neuropathy, following up for pain resulting from L5 lumbar radiculopathy.    She is following with pain management and spine surgeons for further intervention.  Planned for L5 nerve block on September 10 and spine surgery evaluation in March 2025.  She is interested in trying further PT and acupuncture as a bridge to and in conjunction with upcoming interventions.  Plan: - PT referral provided - Acupuncture referral provided - patient counseled on importance of physical therapy in conjunction with interventions for maximal chance of symptom improvement - c/w amitriptyline 10-20mg as needed, could not tolerate higher dose - Can follow up in clinic in 6 months

## 2024-08-28 NOTE — HISTORY OF PRESENT ILLNESS
[FreeTextEntry1] : 63 y/o female with a PMHX of Anxiety, Depression, GERD, L>R leg sciatica (R dx 15 years ago), hypothyroidism not on any treatment, chronic low back pain (s/p lumbar spine fusion 9-10 years ago), and chronic b/l LE neuropathy of unknown etiology, was referred to the neurology clinic by her PCP due to difficulties ambulating. She is here for a follow up visit. According to the patient about 1.5 years ago she started feeling intense "groin pain" that was limiting her ambulation due to pain. She went to see her OBGYN who started her on an estrogen cream, which improved her groin pain but did not fully resolve it completely. Patient reported falls at that time after her legs "gave out" and resulted in a blow to the head with head trauma. She occasionally feels weakness or spasms in her legs which portend a fall, and she has to grab onto something or sit down.    EMG/NCS testing was completed in December 2023 showing L5 lumbar radiculopathy.  Lumbar X-ray performed March 2024 showing mild disc space narrowing at L4-5 and bilateral facet arthropathy at L4-5 and L5-S1, left greater than right.  Since her previous visit, she has followed up with pain management (scheduled for an L5 nerve block September 10), and she is planned for a potential procedure with spine surgery in March 2025.  She has tried gabapentin in the past but could not tolerate the side-effects (felt burning, feverish).  Has tried higher dose amitriptyline (up to 50mg qHS) but felt this did not help at all; she does occasionally take 10-20mg which calms down her discomfort somewhat.  Ibuprofen/Tylenol work best for her but she has had GI issues as a result and only takes them rarely.  She has attempted physical therapy but was not able to tolerate extensive workouts.  She is very interested and willing to try more passive physical therapy with stretching and massages and feels this will help her best; she is also interested in trying acupuncture as this has helped her family members with similar issues.

## 2024-09-12 ENCOUNTER — APPOINTMENT (OUTPATIENT)
Dept: PSYCHIATRY | Facility: CLINIC | Age: 64
End: 2024-09-12
Payer: MEDICAID

## 2024-09-12 ENCOUNTER — OUTPATIENT (OUTPATIENT)
Dept: OUTPATIENT SERVICES | Facility: HOSPITAL | Age: 64
LOS: 1 days | End: 2024-09-12
Payer: MEDICAID

## 2024-09-12 DIAGNOSIS — F42.9 OBSESSIVE-COMPULSIVE DISORDER, UNSPECIFIED: ICD-10-CM

## 2024-09-12 DIAGNOSIS — F41.1 GENERALIZED ANXIETY DISORDER: ICD-10-CM

## 2024-09-12 DIAGNOSIS — F32.9 MAJOR DEPRESSIVE DISORDER, SINGLE EPISODE, UNSPECIFIED: ICD-10-CM

## 2024-09-12 DIAGNOSIS — Z90.89 ACQUIRED ABSENCE OF OTHER ORGANS: Chronic | ICD-10-CM

## 2024-09-12 PROCEDURE — 99214 OFFICE O/P EST MOD 30 MIN: CPT | Mod: 95

## 2024-09-12 RX ORDER — LORATADINE 10 MG/1
10 CAPSULE, LIQUID FILLED ORAL
Qty: 30 | Refills: 1 | Status: ACTIVE | COMMUNITY
Start: 2024-09-12 | End: 1900-01-01

## 2024-09-12 NOTE — ASSESSMENT
[FreeTextEntry1] : Patient relays less anxiety related to medical co-morbidities as she made improvement with URO/GYN. She appears much clearer today and has normal speech.  Current medications 1.  elavil 10mg-20mg at bedtime 2. klonopin 0.5mg po BID 3. hydroxyzine d/c, start loratadine again    follow up in 4 weeks

## 2024-09-12 NOTE — PHYSICAL EXAM
[None] : none [Normal] : good [Fair] : fair [2 - Borderline mentally ill] : 2 - Borderline mentally ill (subtle or suspected pathology) [3 - Minimally improved] : 3 - Minimally improved  (slightly better with little or no clinically meaningful reduction of symptoms. Represents very little change in basic clinical status, level of care, or functional capacity) [Loud] : not loud [Anxious] : no anxious [Irritable] : no irritable [Labile] : not labile

## 2024-09-12 NOTE — REASON FOR VISIT
[Telehealth (audio & video) - Individual/Group] : This visit was provided via telehealth using real-time 2-way audio visual technology. [Medical Office: ([facility])___] : The provider was located at the medical office in [unfilled]. [Home] : The patient, [unfilled], was located at home, [unfilled], at the time of the visit. [Verbal consent obtained from patient/other participant(s)] : Verbal consent for telehealth/telephonic services obtained from patient/other participant(s) [Verbal consent obtained from patient] : the patient, [unfilled] [FreeTextEntry4] : 068uk [FreeTextEntry5] : 226rr [FreeTextEntry2] : 8/23, ISTOP checked [FreeTextEntry1] : "I am a little better, some pain relief."

## 2024-09-12 NOTE — CURRENT PSYCHIATRIC SYMPTOMS
[Obsessions] : obsessions [Depressed Mood] : no depressed mood [Anhedonia] : no anhedonia [Guilt] : not feeling guilty [Decreased Concentration] : no decrease in concentrating ability [Hyperphagia] : no hyperphagia [Insomnia] : no insomnia disorder [Hypersomnia] : no ~T hypersomnia [Psychomotor Retardation] : no psychomotor retardation [Anorexia] : no anorexia [Euphoria] : no euphoria [Highly Irritable] : no high irritability [Increased Activity] : no increased in activity [Distractibility] : not distracted [Talkativeness] : no talkativeness [Grandeur] : no feelings of grandeur [Buying Sprees] : no buying sprees [Hypersexuality] : denied hypersexuality [Dec Need For Sleep] : no decreased need for sleep [Delusions] : no ~T delusions [Hallucination Visual] : no visual hallucinations [Hallucination Auditory] : no auditory hallucinations [Hallucination Tactile] : no tactile hallucinations [Thought Disorder] : ~T a thought disorder was not noted [Excessive Worry] : no excessive worries [Ruminations] : no rumination disorder [Re-experiencing] : no re-experiencing [Restlessness] : no restlessness [Hypochondriasis] : no hypochondriasis [Panic] : no panic disorder [de-identified] : concerning medical problems, is seeing providers

## 2024-09-13 DIAGNOSIS — F41.1 GENERALIZED ANXIETY DISORDER: ICD-10-CM

## 2024-09-13 DIAGNOSIS — F32.9 MAJOR DEPRESSIVE DISORDER, SINGLE EPISODE, UNSPECIFIED: ICD-10-CM

## 2024-09-13 DIAGNOSIS — F42.9 OBSESSIVE-COMPULSIVE DISORDER, UNSPECIFIED: ICD-10-CM

## 2024-10-09 ENCOUNTER — APPOINTMENT (OUTPATIENT)
Dept: PSYCHIATRY | Facility: CLINIC | Age: 64
End: 2024-10-09
Payer: MEDICAID

## 2024-10-09 DIAGNOSIS — F41.1 GENERALIZED ANXIETY DISORDER: ICD-10-CM

## 2024-10-09 DIAGNOSIS — F32.9 MAJOR DEPRESSIVE DISORDER, SINGLE EPISODE, UNSPECIFIED: ICD-10-CM

## 2024-10-09 DIAGNOSIS — F42.9 OBSESSIVE-COMPULSIVE DISORDER, UNSPECIFIED: ICD-10-CM

## 2024-10-09 PROCEDURE — 99214 OFFICE O/P EST MOD 30 MIN: CPT | Mod: 95

## 2024-10-18 ENCOUNTER — RX RENEWAL (OUTPATIENT)
Age: 64
End: 2024-10-18

## 2024-11-06 ENCOUNTER — OUTPATIENT (OUTPATIENT)
Dept: OUTPATIENT SERVICES | Facility: HOSPITAL | Age: 64
LOS: 1 days | End: 2024-11-06
Payer: MEDICAID

## 2024-11-06 ENCOUNTER — APPOINTMENT (OUTPATIENT)
Dept: PSYCHIATRY | Facility: CLINIC | Age: 64
End: 2024-11-06
Payer: MEDICAID

## 2024-11-06 DIAGNOSIS — F32.9 MAJOR DEPRESSIVE DISORDER, SINGLE EPISODE, UNSPECIFIED: ICD-10-CM

## 2024-11-06 DIAGNOSIS — F90.0 ATTENTION-DEFICIT HYPERACTIVITY DISORDER, PREDOMINANTLY INATTENTIVE TYPE: ICD-10-CM

## 2024-11-06 DIAGNOSIS — F42.9 OBSESSIVE-COMPULSIVE DISORDER, UNSPECIFIED: ICD-10-CM

## 2024-11-06 DIAGNOSIS — Z98.890 OTHER SPECIFIED POSTPROCEDURAL STATES: Chronic | ICD-10-CM

## 2024-11-06 DIAGNOSIS — Z90.89 ACQUIRED ABSENCE OF OTHER ORGANS: Chronic | ICD-10-CM

## 2024-11-06 DIAGNOSIS — Z98.82 BREAST IMPLANT STATUS: Chronic | ICD-10-CM

## 2024-11-06 DIAGNOSIS — Z96.611 PRESENCE OF RIGHT ARTIFICIAL SHOULDER JOINT: Chronic | ICD-10-CM

## 2024-11-06 DIAGNOSIS — F41.1 GENERALIZED ANXIETY DISORDER: ICD-10-CM

## 2024-11-06 PROCEDURE — 99214 OFFICE O/P EST MOD 30 MIN: CPT | Mod: 95

## 2024-11-07 DIAGNOSIS — F42.9 OBSESSIVE-COMPULSIVE DISORDER, UNSPECIFIED: ICD-10-CM

## 2024-11-07 DIAGNOSIS — F32.9 MAJOR DEPRESSIVE DISORDER, SINGLE EPISODE, UNSPECIFIED: ICD-10-CM

## 2024-11-07 DIAGNOSIS — F41.1 GENERALIZED ANXIETY DISORDER: ICD-10-CM

## 2024-12-03 ENCOUNTER — OUTPATIENT (OUTPATIENT)
Dept: OUTPATIENT SERVICES | Facility: HOSPITAL | Age: 64
LOS: 1 days | End: 2024-12-03
Payer: MEDICAID

## 2024-12-03 ENCOUNTER — APPOINTMENT (OUTPATIENT)
Dept: NEUROLOGY | Facility: CLINIC | Age: 64
End: 2024-12-03
Payer: MEDICAID

## 2024-12-03 VITALS
HEART RATE: 60 BPM | WEIGHT: 127 LBS | OXYGEN SATURATION: 98 % | DIASTOLIC BLOOD PRESSURE: 66 MMHG | SYSTOLIC BLOOD PRESSURE: 106 MMHG | BODY MASS INDEX: 20.5 KG/M2

## 2024-12-03 DIAGNOSIS — Z98.890 OTHER SPECIFIED POSTPROCEDURAL STATES: Chronic | ICD-10-CM

## 2024-12-03 DIAGNOSIS — Z98.82 BREAST IMPLANT STATUS: Chronic | ICD-10-CM

## 2024-12-03 DIAGNOSIS — Z00.00 ENCOUNTER FOR GENERAL ADULT MEDICAL EXAMINATION WITHOUT ABNORMAL FINDINGS: ICD-10-CM

## 2024-12-03 DIAGNOSIS — H92.09 OTALGIA, UNSPECIFIED EAR: ICD-10-CM

## 2024-12-03 DIAGNOSIS — M54.32 SCIATICA, LEFT SIDE: ICD-10-CM

## 2024-12-03 DIAGNOSIS — M26.609 UNSPECIFIED TEMPOROMANDIBULAR JOINT DISORDER: ICD-10-CM

## 2024-12-03 DIAGNOSIS — Z90.89 ACQUIRED ABSENCE OF OTHER ORGANS: Chronic | ICD-10-CM

## 2024-12-03 DIAGNOSIS — Z96.611 PRESENCE OF RIGHT ARTIFICIAL SHOULDER JOINT: Chronic | ICD-10-CM

## 2024-12-03 PROCEDURE — G2211 COMPLEX E/M VISIT ADD ON: CPT | Mod: NC

## 2024-12-03 PROCEDURE — 99214 OFFICE O/P EST MOD 30 MIN: CPT

## 2024-12-03 RX ORDER — PREGABALIN 50 MG/1
50 CAPSULE ORAL
Qty: 30 | Refills: 3 | Status: ACTIVE | COMMUNITY
Start: 2024-12-03 | End: 1900-01-01

## 2024-12-04 ENCOUNTER — APPOINTMENT (OUTPATIENT)
Dept: PSYCHIATRY | Facility: CLINIC | Age: 64
End: 2024-12-04
Payer: MEDICAID

## 2024-12-04 ENCOUNTER — OUTPATIENT (OUTPATIENT)
Dept: OUTPATIENT SERVICES | Facility: HOSPITAL | Age: 64
LOS: 1 days | End: 2024-12-04
Payer: MEDICAID

## 2024-12-04 DIAGNOSIS — F32.9 MAJOR DEPRESSIVE DISORDER, SINGLE EPISODE, UNSPECIFIED: ICD-10-CM

## 2024-12-04 DIAGNOSIS — Z90.89 ACQUIRED ABSENCE OF OTHER ORGANS: Chronic | ICD-10-CM

## 2024-12-04 DIAGNOSIS — F42.9 OBSESSIVE-COMPULSIVE DISORDER, UNSPECIFIED: ICD-10-CM

## 2024-12-04 DIAGNOSIS — Z96.611 PRESENCE OF RIGHT ARTIFICIAL SHOULDER JOINT: Chronic | ICD-10-CM

## 2024-12-04 DIAGNOSIS — Z98.890 OTHER SPECIFIED POSTPROCEDURAL STATES: Chronic | ICD-10-CM

## 2024-12-04 DIAGNOSIS — Z98.82 BREAST IMPLANT STATUS: Chronic | ICD-10-CM

## 2024-12-04 DIAGNOSIS — F41.1 GENERALIZED ANXIETY DISORDER: ICD-10-CM

## 2024-12-04 PROCEDURE — 99214 OFFICE O/P EST MOD 30 MIN: CPT | Mod: 95

## 2024-12-04 RX ORDER — QUETIAPINE FUMARATE 100 MG/1
100 TABLET ORAL
Qty: 30 | Refills: 1 | Status: ACTIVE | COMMUNITY
Start: 2024-12-04 | End: 1900-01-01

## 2024-12-05 DIAGNOSIS — F42.9 OBSESSIVE-COMPULSIVE DISORDER, UNSPECIFIED: ICD-10-CM

## 2024-12-05 DIAGNOSIS — F32.9 MAJOR DEPRESSIVE DISORDER, SINGLE EPISODE, UNSPECIFIED: ICD-10-CM

## 2024-12-10 DIAGNOSIS — M54.32 SCIATICA, LEFT SIDE: ICD-10-CM

## 2024-12-10 DIAGNOSIS — M26.609 UNSPECIFIED TEMPOROMANDIBULAR JOINT DISORDER, UNSPECIFIED SIDE: ICD-10-CM

## 2024-12-10 DIAGNOSIS — H92.09 OTALGIA, UNSPECIFIED EAR: ICD-10-CM

## 2024-12-30 ENCOUNTER — NON-APPOINTMENT (OUTPATIENT)
Age: 64
End: 2024-12-30

## 2025-01-06 ENCOUNTER — OUTPATIENT (OUTPATIENT)
Dept: OUTPATIENT SERVICES | Facility: HOSPITAL | Age: 65
LOS: 1 days | End: 2025-01-06
Payer: MEDICAID

## 2025-01-06 ENCOUNTER — APPOINTMENT (OUTPATIENT)
Dept: PSYCHIATRY | Facility: CLINIC | Age: 65
End: 2025-01-06
Payer: MEDICAID

## 2025-01-06 DIAGNOSIS — Z98.890 OTHER SPECIFIED POSTPROCEDURAL STATES: Chronic | ICD-10-CM

## 2025-01-06 DIAGNOSIS — Z90.89 ACQUIRED ABSENCE OF OTHER ORGANS: Chronic | ICD-10-CM

## 2025-01-06 DIAGNOSIS — F41.1 GENERALIZED ANXIETY DISORDER: ICD-10-CM

## 2025-01-06 DIAGNOSIS — F32.9 MAJOR DEPRESSIVE DISORDER, SINGLE EPISODE, UNSPECIFIED: ICD-10-CM

## 2025-01-06 DIAGNOSIS — Z98.82 BREAST IMPLANT STATUS: Chronic | ICD-10-CM

## 2025-01-06 DIAGNOSIS — Z96.611 PRESENCE OF RIGHT ARTIFICIAL SHOULDER JOINT: Chronic | ICD-10-CM

## 2025-01-06 DIAGNOSIS — F42.9 OBSESSIVE-COMPULSIVE DISORDER, UNSPECIFIED: ICD-10-CM

## 2025-01-06 PROCEDURE — 99214 OFFICE O/P EST MOD 30 MIN: CPT | Mod: 95

## 2025-01-06 PROCEDURE — 98007 SYNCH AUDIO-VIDEO EST HI 40: CPT

## 2025-01-07 DIAGNOSIS — F32.9 MAJOR DEPRESSIVE DISORDER, SINGLE EPISODE, UNSPECIFIED: ICD-10-CM

## 2025-01-07 DIAGNOSIS — F42.9 OBSESSIVE-COMPULSIVE DISORDER, UNSPECIFIED: ICD-10-CM

## 2025-01-07 DIAGNOSIS — F41.1 GENERALIZED ANXIETY DISORDER: ICD-10-CM

## 2025-02-03 ENCOUNTER — APPOINTMENT (OUTPATIENT)
Dept: PSYCHIATRY | Facility: CLINIC | Age: 65
End: 2025-02-03
Payer: MEDICAID

## 2025-02-03 ENCOUNTER — OUTPATIENT (OUTPATIENT)
Dept: OUTPATIENT SERVICES | Facility: HOSPITAL | Age: 65
LOS: 1 days | End: 2025-02-03
Payer: MEDICAID

## 2025-02-03 DIAGNOSIS — F41.1 GENERALIZED ANXIETY DISORDER: ICD-10-CM

## 2025-02-03 DIAGNOSIS — F32.9 MAJOR DEPRESSIVE DISORDER, SINGLE EPISODE, UNSPECIFIED: ICD-10-CM

## 2025-02-03 DIAGNOSIS — Z96.611 PRESENCE OF RIGHT ARTIFICIAL SHOULDER JOINT: Chronic | ICD-10-CM

## 2025-02-03 DIAGNOSIS — Z98.890 OTHER SPECIFIED POSTPROCEDURAL STATES: Chronic | ICD-10-CM

## 2025-02-03 DIAGNOSIS — Z98.82 BREAST IMPLANT STATUS: Chronic | ICD-10-CM

## 2025-02-03 DIAGNOSIS — Z90.89 ACQUIRED ABSENCE OF OTHER ORGANS: Chronic | ICD-10-CM

## 2025-02-03 DIAGNOSIS — F42.9 OBSESSIVE-COMPULSIVE DISORDER, UNSPECIFIED: ICD-10-CM

## 2025-02-03 PROCEDURE — 98007 SYNCH AUDIO-VIDEO EST HI 40: CPT

## 2025-02-03 PROCEDURE — 99214 OFFICE O/P EST MOD 30 MIN: CPT | Mod: 95

## 2025-02-04 DIAGNOSIS — F41.1 GENERALIZED ANXIETY DISORDER: ICD-10-CM

## 2025-02-04 DIAGNOSIS — F32.9 MAJOR DEPRESSIVE DISORDER, SINGLE EPISODE, UNSPECIFIED: ICD-10-CM

## 2025-02-04 DIAGNOSIS — F42.9 OBSESSIVE-COMPULSIVE DISORDER, UNSPECIFIED: ICD-10-CM

## 2025-02-27 ENCOUNTER — APPOINTMENT (OUTPATIENT)
Dept: OBGYN | Facility: CLINIC | Age: 65
End: 2025-02-27

## 2025-03-04 ENCOUNTER — APPOINTMENT (OUTPATIENT)
Dept: NEUROLOGY | Facility: CLINIC | Age: 65
End: 2025-03-04

## 2025-03-10 ENCOUNTER — NON-APPOINTMENT (OUTPATIENT)
Age: 65
End: 2025-03-10

## 2025-03-10 ENCOUNTER — APPOINTMENT (OUTPATIENT)
Dept: PSYCHIATRY | Facility: CLINIC | Age: 65
End: 2025-03-10

## 2025-03-14 ENCOUNTER — OUTPATIENT (OUTPATIENT)
Dept: OUTPATIENT SERVICES | Facility: HOSPITAL | Age: 65
LOS: 1 days | End: 2025-03-14
Payer: MEDICAID

## 2025-03-14 ENCOUNTER — APPOINTMENT (OUTPATIENT)
Dept: PSYCHIATRY | Facility: CLINIC | Age: 65
End: 2025-03-14
Payer: MEDICAID

## 2025-03-14 DIAGNOSIS — F32.9 MAJOR DEPRESSIVE DISORDER, SINGLE EPISODE, UNSPECIFIED: ICD-10-CM

## 2025-03-14 DIAGNOSIS — Z90.89 ACQUIRED ABSENCE OF OTHER ORGANS: Chronic | ICD-10-CM

## 2025-03-14 DIAGNOSIS — Z98.82 BREAST IMPLANT STATUS: Chronic | ICD-10-CM

## 2025-03-14 DIAGNOSIS — Z96.611 PRESENCE OF RIGHT ARTIFICIAL SHOULDER JOINT: Chronic | ICD-10-CM

## 2025-03-14 DIAGNOSIS — F42.9 OBSESSIVE-COMPULSIVE DISORDER, UNSPECIFIED: ICD-10-CM

## 2025-03-14 DIAGNOSIS — F41.1 GENERALIZED ANXIETY DISORDER: ICD-10-CM

## 2025-03-14 DIAGNOSIS — Z98.890 OTHER SPECIFIED POSTPROCEDURAL STATES: Chronic | ICD-10-CM

## 2025-03-14 PROCEDURE — 99214 OFFICE O/P EST MOD 30 MIN: CPT | Mod: 95

## 2025-03-14 PROCEDURE — 98007 SYNCH AUDIO-VIDEO EST HI 40: CPT

## 2025-03-15 DIAGNOSIS — F41.1 GENERALIZED ANXIETY DISORDER: ICD-10-CM

## 2025-03-15 DIAGNOSIS — F42.9 OBSESSIVE-COMPULSIVE DISORDER, UNSPECIFIED: ICD-10-CM

## 2025-03-15 DIAGNOSIS — F32.9 MAJOR DEPRESSIVE DISORDER, SINGLE EPISODE, UNSPECIFIED: ICD-10-CM

## 2025-04-11 ENCOUNTER — APPOINTMENT (OUTPATIENT)
Dept: PSYCHIATRY | Facility: CLINIC | Age: 65
End: 2025-04-11

## 2025-04-11 ENCOUNTER — NON-APPOINTMENT (OUTPATIENT)
Age: 65
End: 2025-04-11

## 2025-04-14 ENCOUNTER — OUTPATIENT (OUTPATIENT)
Dept: OUTPATIENT SERVICES | Facility: HOSPITAL | Age: 65
LOS: 1 days | End: 2025-04-14
Payer: MEDICAID

## 2025-04-14 ENCOUNTER — APPOINTMENT (OUTPATIENT)
Dept: PSYCHIATRY | Facility: CLINIC | Age: 65
End: 2025-04-14
Payer: MEDICAID

## 2025-04-14 DIAGNOSIS — Z90.89 ACQUIRED ABSENCE OF OTHER ORGANS: Chronic | ICD-10-CM

## 2025-04-14 DIAGNOSIS — Z98.890 OTHER SPECIFIED POSTPROCEDURAL STATES: Chronic | ICD-10-CM

## 2025-04-14 DIAGNOSIS — F41.1 GENERALIZED ANXIETY DISORDER: ICD-10-CM

## 2025-04-14 DIAGNOSIS — F42.9 OBSESSIVE-COMPULSIVE DISORDER, UNSPECIFIED: ICD-10-CM

## 2025-04-14 DIAGNOSIS — F32.9 MAJOR DEPRESSIVE DISORDER, SINGLE EPISODE, UNSPECIFIED: ICD-10-CM

## 2025-04-14 DIAGNOSIS — Z98.82 BREAST IMPLANT STATUS: Chronic | ICD-10-CM

## 2025-04-14 DIAGNOSIS — Z96.611 PRESENCE OF RIGHT ARTIFICIAL SHOULDER JOINT: Chronic | ICD-10-CM

## 2025-04-14 PROCEDURE — 99214 OFFICE O/P EST MOD 30 MIN: CPT | Mod: 95

## 2025-04-14 PROCEDURE — 98007 SYNCH AUDIO-VIDEO EST HI 40: CPT

## 2025-04-15 DIAGNOSIS — F42.9 OBSESSIVE-COMPULSIVE DISORDER, UNSPECIFIED: ICD-10-CM

## 2025-04-15 DIAGNOSIS — F41.1 GENERALIZED ANXIETY DISORDER: ICD-10-CM

## 2025-04-15 DIAGNOSIS — F32.9 MAJOR DEPRESSIVE DISORDER, SINGLE EPISODE, UNSPECIFIED: ICD-10-CM

## 2025-04-22 ENCOUNTER — OUTPATIENT (OUTPATIENT)
Dept: OUTPATIENT SERVICES | Facility: HOSPITAL | Age: 65
LOS: 1 days | Discharge: HOME | End: 2025-04-22
Payer: MEDICAID

## 2025-04-22 ENCOUNTER — TRANSCRIPTION ENCOUNTER (OUTPATIENT)
Age: 65
End: 2025-04-22

## 2025-04-22 VITALS
OXYGEN SATURATION: 99 % | HEART RATE: 69 BPM | WEIGHT: 119.93 LBS | HEIGHT: 66 IN | TEMPERATURE: 98 F | RESPIRATION RATE: 20 BRPM | SYSTOLIC BLOOD PRESSURE: 128 MMHG | DIASTOLIC BLOOD PRESSURE: 73 MMHG

## 2025-04-22 VITALS
RESPIRATION RATE: 18 BRPM | SYSTOLIC BLOOD PRESSURE: 124 MMHG | DIASTOLIC BLOOD PRESSURE: 75 MMHG | HEART RATE: 55 BPM | OXYGEN SATURATION: 99 %

## 2025-04-22 DIAGNOSIS — Z98.82 BREAST IMPLANT STATUS: Chronic | ICD-10-CM

## 2025-04-22 DIAGNOSIS — R93.5 ABNORMAL FINDINGS ON DIAGNOSTIC IMAGING OF OTHER ABDOMINAL REGIONS, INCLUDING RETROPERITONEUM: ICD-10-CM

## 2025-04-22 DIAGNOSIS — Z90.89 ACQUIRED ABSENCE OF OTHER ORGANS: Chronic | ICD-10-CM

## 2025-04-22 DIAGNOSIS — Z98.890 OTHER SPECIFIED POSTPROCEDURAL STATES: Chronic | ICD-10-CM

## 2025-04-22 DIAGNOSIS — Z96.611 PRESENCE OF RIGHT ARTIFICIAL SHOULDER JOINT: Chronic | ICD-10-CM

## 2025-04-22 PROCEDURE — 88305 TISSUE EXAM BY PATHOLOGIST: CPT

## 2025-04-22 PROCEDURE — 88112 CYTOPATH CELL ENHANCE TECH: CPT | Mod: XU

## 2025-04-22 PROCEDURE — 88312 SPECIAL STAINS GROUP 1: CPT | Mod: 26

## 2025-04-22 PROCEDURE — 74018 RADEX ABDOMEN 1 VIEW: CPT

## 2025-04-22 PROCEDURE — 88312 SPECIAL STAINS GROUP 1: CPT

## 2025-04-22 PROCEDURE — C1769: CPT

## 2025-04-22 PROCEDURE — 88173 CYTOPATH EVAL FNA REPORT: CPT | Mod: 26

## 2025-04-22 PROCEDURE — 88112 CYTOPATH CELL ENHANCE TECH: CPT | Mod: 26

## 2025-04-22 PROCEDURE — 88305 TISSUE EXAM BY PATHOLOGIST: CPT | Mod: 26

## 2025-04-22 PROCEDURE — 88173 CYTOPATH EVAL FNA REPORT: CPT

## 2025-04-22 PROCEDURE — 88305 TISSUE EXAM BY PATHOLOGIST: CPT | Mod: 26,XP

## 2025-04-22 RX ORDER — INDOMETHACIN 50 MG
100 CAPSULE ORAL ONCE
Refills: 0 | Status: COMPLETED | OUTPATIENT
Start: 2025-04-22 | End: 2025-04-22

## 2025-04-22 RX ADMIN — Medication 100 MILLIGRAM(S): at 14:59

## 2025-04-22 NOTE — ASU DISCHARGE PLAN (ADULT/PEDIATRIC) - FINANCIAL ASSISTANCE
[facility] provides services at a reduced cost to those who are determined to be eligible through [facility]’s financial assistance program. Information regarding [facility]’s financial assistance program can be found by going to [email or web address] or by calling [phone].

## 2025-04-22 NOTE — CHART NOTE - NSCHARTNOTEFT_GEN_A_CORE
PACU ANESTHESIA ADMISSION NOTE      Procedure:   Post op diagnosis:      ____  Intubated  TV:______       Rate: ______      FiO2: ______    __x__  Patent Airway    __x__  Full return of protective reflexes    __x__  Full recovery from anesthesia / back to baseline     Vitals:   T: 37          R: 15                 BP:   109/64               Sat:    98               P: 82      Mental Status:  __x__ Awake   ___x__ Alert   _____ Drowsy   _____ Sedated    Nausea/Vomiting:  _x___ NO  ______Yes,   See Post - Op Orders          Pain Scale (0-10):  _____    Treatment: __x__ None    ____ See Post - Op/PCA Orders    Post - Operative Fluids:   ____ Oral   ___x_ See Post - Op Orders    Plan: Discharge:   __x__Home       _____Floor     _____Critical Care    _____  Other:_________________    Comments:    Uneventful anesthesia. Patient transported to  spontaneously breathing and hemodynamically stable.

## 2025-04-22 NOTE — H&P ADULT - ASSESSMENT
ok to proceed with procedure, i discussed risks of EUS and ERCP including bleeding, perforation, pancreatitis and infection

## 2025-04-24 LAB — SURGICAL PATHOLOGY STUDY: SIGNIFICANT CHANGE UP

## 2025-04-25 DIAGNOSIS — K83.8 OTHER SPECIFIED DISEASES OF BILIARY TRACT: ICD-10-CM

## 2025-04-25 DIAGNOSIS — K29.50 UNSPECIFIED CHRONIC GASTRITIS WITHOUT BLEEDING: ICD-10-CM

## 2025-04-25 DIAGNOSIS — K83.1 OBSTRUCTION OF BILE DUCT: ICD-10-CM

## 2025-04-28 LAB — NON-GYNECOLOGICAL CYTOLOGY STUDY: SIGNIFICANT CHANGE UP

## 2025-05-12 ENCOUNTER — APPOINTMENT (OUTPATIENT)
Dept: PSYCHIATRY | Facility: CLINIC | Age: 65
End: 2025-05-12
Payer: MEDICAID

## 2025-05-12 ENCOUNTER — OUTPATIENT (OUTPATIENT)
Dept: OUTPATIENT SERVICES | Facility: HOSPITAL | Age: 65
LOS: 1 days | End: 2025-05-12
Payer: MEDICAID

## 2025-05-12 DIAGNOSIS — F42.9 OBSESSIVE-COMPULSIVE DISORDER, UNSPECIFIED: ICD-10-CM

## 2025-05-12 DIAGNOSIS — Z98.890 OTHER SPECIFIED POSTPROCEDURAL STATES: Chronic | ICD-10-CM

## 2025-05-12 DIAGNOSIS — F32.9 MAJOR DEPRESSIVE DISORDER, SINGLE EPISODE, UNSPECIFIED: ICD-10-CM

## 2025-05-12 DIAGNOSIS — F41.1 GENERALIZED ANXIETY DISORDER: ICD-10-CM

## 2025-05-12 DIAGNOSIS — Z98.82 BREAST IMPLANT STATUS: Chronic | ICD-10-CM

## 2025-05-12 DIAGNOSIS — Z90.89 ACQUIRED ABSENCE OF OTHER ORGANS: Chronic | ICD-10-CM

## 2025-05-12 DIAGNOSIS — Z96.611 PRESENCE OF RIGHT ARTIFICIAL SHOULDER JOINT: Chronic | ICD-10-CM

## 2025-05-12 PROCEDURE — 99214 OFFICE O/P EST MOD 30 MIN: CPT | Mod: 95

## 2025-05-12 PROCEDURE — 98007 SYNCH AUDIO-VIDEO EST HI 40: CPT

## 2025-05-13 DIAGNOSIS — F41.1 GENERALIZED ANXIETY DISORDER: ICD-10-CM

## 2025-05-13 DIAGNOSIS — F42.9 OBSESSIVE-COMPULSIVE DISORDER, UNSPECIFIED: ICD-10-CM

## 2025-05-13 DIAGNOSIS — F32.9 MAJOR DEPRESSIVE DISORDER, SINGLE EPISODE, UNSPECIFIED: ICD-10-CM
